# Patient Record
Sex: FEMALE | Race: WHITE | NOT HISPANIC OR LATINO | Employment: FULL TIME | URBAN - METROPOLITAN AREA
[De-identification: names, ages, dates, MRNs, and addresses within clinical notes are randomized per-mention and may not be internally consistent; named-entity substitution may affect disease eponyms.]

---

## 2017-06-14 ENCOUNTER — OFFICE VISIT (OUTPATIENT)
Dept: OBSTETRICS AND GYNECOLOGY | Facility: CLINIC | Age: 52
End: 2017-06-14
Attending: OBSTETRICS & GYNECOLOGY
Payer: COMMERCIAL

## 2017-06-14 VITALS
HEIGHT: 69 IN | SYSTOLIC BLOOD PRESSURE: 130 MMHG | WEIGHT: 244.5 LBS | BODY MASS INDEX: 36.21 KG/M2 | DIASTOLIC BLOOD PRESSURE: 80 MMHG

## 2017-06-14 DIAGNOSIS — N95.0 POSTMENOPAUSAL BLEEDING: Primary | ICD-10-CM

## 2017-06-14 DIAGNOSIS — N84.1 CERVICAL POLYP: ICD-10-CM

## 2017-06-14 PROCEDURE — 87591 N.GONORRHOEAE DNA AMP PROB: CPT

## 2017-06-14 PROCEDURE — 99203 OFFICE O/P NEW LOW 30 MIN: CPT | Mod: 25,S$GLB,, | Performed by: OBSTETRICS & GYNECOLOGY

## 2017-06-14 PROCEDURE — 88305 TISSUE EXAM BY PATHOLOGIST: CPT | Mod: 26,,, | Performed by: PATHOLOGY

## 2017-06-14 PROCEDURE — 88305 TISSUE EXAM BY PATHOLOGIST: CPT | Performed by: PATHOLOGY

## 2017-06-14 PROCEDURE — 57500 BIOPSY OF CERVIX: CPT | Mod: S$GLB,,, | Performed by: OBSTETRICS & GYNECOLOGY

## 2017-06-14 PROCEDURE — 99999 PR PBB SHADOW E&M-EST. PATIENT-LVL III: CPT | Mod: PBBFAC,,, | Performed by: OBSTETRICS & GYNECOLOGY

## 2017-06-14 NOTE — PROGRESS NOTES
Chief Complaint   Patient presents with    Vaginal Bleeding       HPI:  Nadia Sbaa is a 51 y.o. female patient  who presents today for evaluation of postmenopausal bleeding.  She describes being postmenopausal for the past 2 years except for 1 episode of spotting about 6 months ago.  However, two weeks ago, she describes bleeding like a period, lasting 7 days in duration.  She was seen by her primary care physician last week at [facility] and had a pap performed.  Patient's last menstrual period was 2013.    Past Medical History:   Diagnosis Date    Arthritis     Fibromyalgia        Past Surgical History:   Procedure Laterality Date    APPENDECTOMY       SECTION      TONSILLECTOMY           ROS:  GENERAL: Feeling well overall.   SKIN: Denies rash or lesions.   HEAD: Denies head injury or headache.   NODES: Denies enlarged lymph nodes.   CHEST: Denies chest pain or shortness of breath.   CARDIOVASCULAR: Denies palpitations or left sided chest pain.   ABDOMEN: No abdominal pain, nausea, vomiting or rectal bleeding.   URINARY: No dysuria or hematuria.  REPRODUCTIVE: See HPI.   BREASTS: Denies pain, lumps, or nipple discharge.   HEMATOLOGIC: No easy bruisability or excessive bleeding.   MUSCULOSKELETAL: Reports joint discomfort.   NEUROLOGIC: Denies syncope or weakness.   PSYCHIATRIC: Denies depression.    PE:   (chaperone present during entire exam)  APPEARANCE: Well nourished, well developed, in no acute distress.  ABDOMEN: Soft. No tenderness or masses. No hernias. No CVA tenderness.  VULVA: No lesions. Normal female genitalia.  URETHRAL MEATUS: Normal size and location, no lesions, no prolapse.  URETHRA: No masses, tenderness, prolapse or scarring.  VAGINA: No lesions, no discharge, no significant cystocele or rectocele.  CERVIX: No lesions and discharge.  Endocervical polyp at os.  UTERUS: Normal size, regular shape, mobile, non-tender, bladder base nontender.  ADNEXA:  No masses, tenderness or CDS nodularity.  ANUS PERINEUM: Normal.    We discussed the endocervical polyp and recommendation for removal.  Reviewed r / b of polypectomy - consent given  Cervix prepped.  Polyp grasped with ring forceps.  With twisting, polyp easily removed.  Hemostatic.  Polyp sent to pathology for evaluation.      Diagnosis:  1. Postmenopausal bleeding    2. Cervical polyp          PLAN:    Orders Placed This Encounter    C. trachomatis/N. gonorrhoeae by AMP DNA Cervix    US Pelvis Limited Non OB    POCT Urine Pregnancy    Tissue Specimen To Pathology, Obstetrics/Gynecology       Patient was counseled today on her recent episode of PMB and the various etiologies.  We reviewed the need for pelvic sono and EMBX to rule out endometrial pathology.  We also discussed the endocervical polyp that was removed today.  She will bring a copy of her pap to her next visit.    Follow-up after pelvic sono 6/15 for EMBX 6/22.

## 2017-06-15 ENCOUNTER — TELEPHONE (OUTPATIENT)
Dept: OBSTETRICS AND GYNECOLOGY | Facility: CLINIC | Age: 52
End: 2017-06-15

## 2017-06-15 ENCOUNTER — HOSPITAL ENCOUNTER (OUTPATIENT)
Dept: RADIOLOGY | Facility: OTHER | Age: 52
Discharge: HOME | End: 2017-06-15
Attending: OBSTETRICS & GYNECOLOGY
Payer: COMMERCIAL

## 2017-06-15 DIAGNOSIS — N95.0 POSTMENOPAUSAL BLEEDING: ICD-10-CM

## 2017-06-15 PROCEDURE — 76856 US EXAM PELVIC COMPLETE: CPT | Mod: TC

## 2017-06-15 PROCEDURE — 76830 TRANSVAGINAL US NON-OB: CPT | Mod: 26,,, | Performed by: RADIOLOGY

## 2017-06-15 PROCEDURE — 76856 US EXAM PELVIC COMPLETE: CPT | Mod: 26,,, | Performed by: RADIOLOGY

## 2017-06-15 NOTE — TELEPHONE ENCOUNTER
Called patient:    Discussed results of pelvic sono from today:    The uterus is normal in size measuring 8.1 x 4.9 x 6.9 cm and the endometrial stripe is uniform in thickness measuring 9 mm.  There is a 4.8 cm left posterior intramural fibroid.    The right ovary measures 3.0 x 1.9 x 2.4 cm with normal blood flow. 2.2 cm complex appearing nodule in the right ovary.    The left ovary is not visualized.    There is no free pelvic fluid.      Impression         Endometrial stripe is enlarged to 9 mm, which is abnormal in a postmenopausal female. Endometrial sampling may be warranted.    There is also a 2.2 cm right ovarian nodule. Short term interval followup versus further evaluation with MRI or surgical consultation is advised given postmenopausal status.     She is scheduled to see us 6/22/17 for EBMX    Discussed the need for follow-up for ovarian cyst.

## 2017-06-16 LAB
C TRACH DNA SPEC QL NAA+PROBE: NOT DETECTED
N GONORRHOEA DNA SPEC QL NAA+PROBE: NOT DETECTED

## 2017-06-19 ENCOUNTER — TELEPHONE (OUTPATIENT)
Dept: OBSTETRICS AND GYNECOLOGY | Facility: CLINIC | Age: 52
End: 2017-06-19

## 2017-06-19 NOTE — TELEPHONE ENCOUNTER
Called patient:    Discussed results of polypectomy    FINAL PATHOLOGIC DIAGNOSIS  BENIGN ENDOCERVICAL POLYP    She is scheduled to see us later this week for EMBX.

## 2017-06-22 ENCOUNTER — PROCEDURE VISIT (OUTPATIENT)
Dept: OBSTETRICS AND GYNECOLOGY | Facility: CLINIC | Age: 52
End: 2017-06-22
Attending: OBSTETRICS & GYNECOLOGY
Payer: COMMERCIAL

## 2017-06-22 VITALS
WEIGHT: 247.56 LBS | HEIGHT: 69 IN | DIASTOLIC BLOOD PRESSURE: 76 MMHG | BODY MASS INDEX: 36.67 KG/M2 | SYSTOLIC BLOOD PRESSURE: 118 MMHG

## 2017-06-22 DIAGNOSIS — R87.615 UNSATISFACTORY CERVICAL PAPANICOLAOU SMEAR: ICD-10-CM

## 2017-06-22 DIAGNOSIS — N95.0 POSTMENOPAUSAL BLEEDING: Primary | ICD-10-CM

## 2017-06-22 DIAGNOSIS — N83.8 OVARIAN MASS: ICD-10-CM

## 2017-06-22 PROCEDURE — 88305 TISSUE EXAM BY PATHOLOGIST: CPT | Mod: 26,,, | Performed by: PATHOLOGY

## 2017-06-22 PROCEDURE — 87624 HPV HI-RISK TYP POOLED RSLT: CPT

## 2017-06-22 PROCEDURE — 88175 CYTOPATH C/V AUTO FLUID REDO: CPT

## 2017-06-22 PROCEDURE — 88305 TISSUE EXAM BY PATHOLOGIST: CPT | Performed by: PATHOLOGY

## 2017-06-22 PROCEDURE — 58100 BIOPSY OF UTERUS LINING: CPT | Mod: S$GLB,,, | Performed by: OBSTETRICS & GYNECOLOGY

## 2017-06-22 NOTE — PROCEDURES
CC: ENDOMETRIAL BIOPSPY    Nadia Saba is a 51 y.o. female  presents for an endometrial biopsy secondary to postmenopausal bleeding.  She describes being postmenopausal for the past 2 years except for 1 episode of spotting about 6 months ago.  However, three weeks ago, she describes bleeding like a period, lasting 7 days in duration.  She reports that her most recent pap performed by her PCP at the [facility] was unsatisfactory.      Pelvic sono:  The uterus is normal in size measuring 8.1 x 4.9 x 6.9 cm and the endometrial stripe is uniform in thickness measuring 9 mm.  There is a 4.8 cm left posterior intramural fibroid.  The right ovary measures 3.0 x 1.9 x 2.4 cm with normal blood flow. 2.2 cm complex appearing nodule in the right ovary.  The left ovary is not visualized.  There is no free pelvic fluid.   Impression   Endometrial stripe is enlarged to 9 mm, which is abnormal in a postmenopausal female. Endometrial sampling may be warranted.  There is also a 2.2 cm right ovarian nodule. Short term interval followup versus further evaluation with MRI or surgical consultation is advised given postmenopausal status.       UPT is negative    PRE ENDOMETRIAL BIOPSY COUNSELING:  The patient was informed of the risk of bleeding, infection, uterine perforation and pain and that the test will rule-out endometrial cancer with accuracy greater than 95%. She was counseled on the alternatives to endometrial biopsy and agrees to proceed.    TIME OUT PERFORMED.  The cervix was visualized with a speculum  Pap performed  Cervix prepped with betadine  A sterile endometrial pipelle was passed without difficulty to a depth of 8 cm.  Endometrial tissue was obtained.  The specimen was placed in formalyn and sent to Pathology for histology evaluation.   The patient tolerated the procedure well.    ASSESSMENT and PLAN    1. Postmenopausal bleeding    2. Ovarian mass          POST ENDOMETRIAL BIOPSY  COUNSELING:  Manage post biopsy cramping with NSAIDs or Tylenol.  Expect spotting or light bleeding for a few days.  Report bleeding heavier than a period, fever > 101.0 F, worsening pain or a foul smelling vaginal discharge.    We also discussed the pelvic ultrasound and the 2 cm ovarian nodule.  She will have pelvic MRI performed for further evaluation.     FOLLOW-UP: Pending biopsy results.  We will contact her in several days to discuss biopsy results and treatment plan.  Pelvic MRI

## 2017-06-27 ENCOUNTER — TELEPHONE (OUTPATIENT)
Dept: OBSTETRICS AND GYNECOLOGY | Facility: CLINIC | Age: 52
End: 2017-06-27

## 2017-06-27 NOTE — TELEPHONE ENCOUNTER
Patient declined the appointment offered to her today but was scheduled next week for the MRI to be done earlier. Patient was advised to call or go to the ED if the symptoms worsened. Patient verbalized understanding and will comply.

## 2017-06-27 NOTE — TELEPHONE ENCOUNTER
----- Message from Mary Rivera sent at 6/26/2017  4:01 PM CDT -----  Contact: self  Patient is requesting a call back to discuss severe lower back pain she has been having, patient can be reached at [phone].

## 2017-06-29 ENCOUNTER — PATIENT MESSAGE (OUTPATIENT)
Dept: OBSTETRICS AND GYNECOLOGY | Facility: CLINIC | Age: 52
End: 2017-06-29

## 2017-06-29 LAB
HPV HR 12 DNA CVX QL NAA+PROBE: NEGATIVE
HPV16 DNA SPEC QL NAA+PROBE: NEGATIVE
HPV18 DNA SPEC QL NAA+PROBE: NEGATIVE

## 2017-07-03 ENCOUNTER — TELEPHONE (OUTPATIENT)
Dept: OBSTETRICS AND GYNECOLOGY | Facility: CLINIC | Age: 52
End: 2017-07-03

## 2017-07-03 NOTE — TELEPHONE ENCOUNTER
----- Message from Alexandr Maldonado sent at 7/3/2017 12:50 PM CDT -----  Contact: Tomeka HAYDEN   x_ 1st Request  _ 2nd Request  _ 3rd Request    Who: Tomeka HAYDEN     Why: Tomeka HAYDEN is reporting that the patient's authorization for the pelvic MRI has been approved. Dates of approval last thru 07/03/17 - 10/31/17.     What Number to Call Back: [phone]    When to Expect a call back: (Before the end of the day)  -- if call after 3:00 call back will be tomorrow.

## 2017-07-05 ENCOUNTER — HOSPITAL ENCOUNTER (OUTPATIENT)
Dept: RADIOLOGY | Facility: OTHER | Age: 52
Discharge: HOME | End: 2017-07-05
Attending: OBSTETRICS & GYNECOLOGY
Payer: COMMERCIAL

## 2017-07-05 DIAGNOSIS — N83.8 OVARIAN MASS: ICD-10-CM

## 2017-07-05 PROCEDURE — A9585 GADOBUTROL INJECTION: HCPCS | Performed by: OBSTETRICS & GYNECOLOGY

## 2017-07-05 PROCEDURE — 72197 MRI PELVIS W/O & W/DYE: CPT | Mod: 26,,, | Performed by: RADIOLOGY

## 2017-07-05 PROCEDURE — 25500020 PHARM REV CODE 255: Performed by: OBSTETRICS & GYNECOLOGY

## 2017-07-05 PROCEDURE — 72197 MRI PELVIS W/O & W/DYE: CPT | Mod: TC

## 2017-07-05 RX ORDER — GADOBUTROL 604.72 MG/ML
10 INJECTION INTRAVENOUS
Status: COMPLETED | OUTPATIENT
Start: 2017-07-05 | End: 2017-07-05

## 2017-07-05 RX ADMIN — GADOBUTROL 10 ML: 604.72 INJECTION INTRAVENOUS at 08:07

## 2017-07-06 ENCOUNTER — PATIENT MESSAGE (OUTPATIENT)
Dept: OBSTETRICS AND GYNECOLOGY | Facility: CLINIC | Age: 52
End: 2017-07-06

## 2017-07-06 ENCOUNTER — TELEPHONE (OUTPATIENT)
Dept: OBSTETRICS AND GYNECOLOGY | Facility: CLINIC | Age: 52
End: 2017-07-06

## 2017-07-06 NOTE — TELEPHONE ENCOUNTER
You do not need to come in, I was just offering an appointment if you wanted to discuss in person. I see that you had the MRI yesterday and Dr Huerta may have been waiting for both test to be resulted before calling you. I will leave the message for him to review. Marli  ===View-only below this line===      ----- Message -----     From: Nadia Saba     Sent: 7/6/2017 11:20 AM CDT       To: Ross Huerta MD  Subject: RE: Test Results    I did not know if I needed to come in or if he would call with them  I guess I will see Monday if he calls, He had told me he would have my biopsy back last week and call me and did not hear from him, thought I would have them back by now , hoping not to have to wait another week for these results, very stressful waiting on these and to wait another week  ----- Message -----  From: Med Assistant Calles  Sent: 7/6/2017 11:03 AM CDT  To: Nadia Saba  Subject: RE: Test Results  Dr Huerta is out of the office until next week. I would be happy to schedule you Thursday morning if you would like to sit down and discuss the results and talk. However I can ask him to call you if you would like sometime next week when he returns. Please let me know your preference. Marli

## 2017-07-07 NOTE — TELEPHONE ENCOUNTER
Please let patient know that her EMBX was normal.    FINAL PATHOLOGIC DIAGNOSIS  ENDOMETRIUM, BIOPSY:  -Scant strips of weakly proliferative endometrium.  -Negative for hyperplasia and malignancy.    I will call her when I return to discuss MRI findings.

## 2017-07-11 ENCOUNTER — TELEPHONE (OUTPATIENT)
Dept: OBSTETRICS AND GYNECOLOGY | Facility: CLINIC | Age: 52
End: 2017-07-11

## 2017-07-11 ENCOUNTER — PATIENT MESSAGE (OUTPATIENT)
Dept: OBSTETRICS AND GYNECOLOGY | Facility: CLINIC | Age: 52
End: 2017-07-11

## 2017-07-11 NOTE — TELEPHONE ENCOUNTER
Test Results     Nadia Huerta MD 3 minutes ago (4:42 PM)         Wanting to see about my MRI results and what steps we need to take next if any

## 2017-07-12 ENCOUNTER — PATIENT MESSAGE (OUTPATIENT)
Dept: OBSTETRICS AND GYNECOLOGY | Facility: CLINIC | Age: 52
End: 2017-07-12

## 2017-07-13 ENCOUNTER — TELEPHONE (OUTPATIENT)
Dept: OBSTETRICS AND GYNECOLOGY | Facility: CLINIC | Age: 52
End: 2017-07-13

## 2017-07-13 ENCOUNTER — PATIENT MESSAGE (OUTPATIENT)
Dept: OBSTETRICS AND GYNECOLOGY | Facility: CLINIC | Age: 52
End: 2017-07-13

## 2017-07-14 ENCOUNTER — TELEPHONE (OUTPATIENT)
Dept: OBSTETRICS AND GYNECOLOGY | Facility: CLINIC | Age: 52
End: 2017-07-14

## 2017-07-14 DIAGNOSIS — N95.0 POSTMENOPAUSAL BLEEDING: Primary | ICD-10-CM

## 2017-07-14 NOTE — TELEPHONE ENCOUNTER
----- Message from Jameel Davila sent at 7/14/2017  9:26 AM CDT -----  Contact: Pt  X_ 1st Request  _ 2nd Request  _ 3rd Request    Who: KIARA CALDERÓN [[id number]]    Why: Patient would like to speak with staff in regards to setting up a D & C    What Number to Call Back: [phone]    When to Expect a call back: (Before the end of the day)  -- if call after 3:00 call back will be tomorrow.

## 2017-07-14 NOTE — TELEPHONE ENCOUNTER
Called patient:    Discussed results of recent MRI, comparing with pelvic sono:    Uterus is anteverted and anteflexed. Endometrium has uniform signal and has a maximal thickness of 13 millimeters. There is small amount of fluid within the endometrial canal. There is an intramural fibroid of the lateral uterus which measures 4.7 x 4.1 x 3.9 cm..       Junctional zone is normal.    Several fibroids are scattered throughout the myometrium. ....    Left ovary measures 3.0 x 2.2 x 1.7 cm cm. The right ovary measures 3.2 x 3.1 x 3.1 cm cm. There Is a 3.0 x 2.7 x 2.3 cm cystic lesion with internal fluid fluid level. Trace free fluid in the region of the left adnexa and cul-de-sac.    Bladder is relatively decompressed and has normal appearance. No enlarged lymph nodes.   Osseous structures have normal marrow signal characteristics.      Impression         Endometrial stripe measured approximately 13 mm, which is a enlarged for a postmenopausal patient. Small amount of fluid within the endometrial canal. Endometrial sampling may be warranted.    The 2.2 cm solid lesion of the right ovary noted on recent ultrasound is not definitively visualized. There is a 3.2 cystic lesion with an internal fluid/fluid level noted within the right ovary which is also abnormal in a postmenopausal patient. Recommend short term interval followup with ultrasound versus surgical consultation. Large intramural uterine fibroid.       She reports having continued episodes of off / on bleeding.  Previously, she had been without a period for several years.  ES is significantly thickened with negative EMBX.  Discussed possible endometrial polyp.    REC: D&C, Hysteroscopy for further evaluation - she will check her calender and let us know date to schedule.  She already has follow-up sono for evaluation of adnexa 8/10/17.

## 2017-08-03 ENCOUNTER — PATIENT MESSAGE (OUTPATIENT)
Dept: SURGERY | Facility: OTHER | Age: 52
End: 2017-08-03

## 2017-08-04 ENCOUNTER — TELEPHONE (OUTPATIENT)
Dept: OBSTETRICS AND GYNECOLOGY | Facility: CLINIC | Age: 52
End: 2017-08-04

## 2017-08-04 NOTE — TELEPHONE ENCOUNTER
----- Message from Alexandr Maldonado sent at 8/4/2017  3:40 PM CDT -----  Contact: Pt  x_ 1st Request  _ 2nd Request  _ 3rd Request    Who: Pt    Why: was instructed to schedule a pre op appointment before her surgery date on 08/10/17.    What Number to Call Back: [phone]    When to Expect a call back: (Before the end of the day)  -- if call after 3:00 call back will be tomorrow.

## 2017-08-04 NOTE — TELEPHONE ENCOUNTER
Message   Received: Today   Message Contents   Alexandr DIETRICH Staff   Caller: Pt (Today,  3:40 PM)             x_ 1st Request   _ 2nd Request   _ 3rd Request     Who: Pt     Why: was instructed to schedule a pre op appointment before her surgery date on 08/10/17.     What Number to Call Back: [phone]     When to Expect a call back: (Before

## 2017-08-07 ENCOUNTER — TELEPHONE (OUTPATIENT)
Dept: OBSTETRICS AND GYNECOLOGY | Facility: CLINIC | Age: 52
End: 2017-08-07

## 2017-08-07 NOTE — TELEPHONE ENCOUNTER
----- Message from Nahed Cage sent at 8/7/2017  9:23 AM CDT -----  Contact: self  Pt returning a missed call, she can be reached at [phone].

## 2017-08-09 ENCOUNTER — HOSPITAL ENCOUNTER (OUTPATIENT)
Dept: PREADMISSION TESTING | Facility: OTHER | Age: 52
Discharge: HOME | End: 2017-08-09
Attending: OBSTETRICS & GYNECOLOGY
Payer: COMMERCIAL

## 2017-08-09 ENCOUNTER — ANESTHESIA EVENT (OUTPATIENT)
Dept: SURGERY | Facility: OTHER | Age: 52
End: 2017-08-09
Payer: COMMERCIAL

## 2017-08-09 ENCOUNTER — OFFICE VISIT (OUTPATIENT)
Dept: OBSTETRICS AND GYNECOLOGY | Facility: CLINIC | Age: 52
End: 2017-08-09
Attending: OBSTETRICS & GYNECOLOGY
Payer: COMMERCIAL

## 2017-08-09 VITALS
HEART RATE: 72 BPM | BODY MASS INDEX: 38.63 KG/M2 | WEIGHT: 260.81 LBS | HEIGHT: 69 IN | OXYGEN SATURATION: 100 % | DIASTOLIC BLOOD PRESSURE: 72 MMHG | SYSTOLIC BLOOD PRESSURE: 134 MMHG | TEMPERATURE: 98 F

## 2017-08-09 VITALS
WEIGHT: 260.81 LBS | DIASTOLIC BLOOD PRESSURE: 82 MMHG | BODY MASS INDEX: 38.63 KG/M2 | SYSTOLIC BLOOD PRESSURE: 124 MMHG | HEIGHT: 69 IN

## 2017-08-09 DIAGNOSIS — N93.9 ABNORMAL UTERINE BLEEDING (AUB): Primary | ICD-10-CM

## 2017-08-09 LAB
BASOPHILS # BLD AUTO: 0.02 K/UL
BASOPHILS NFR BLD: 0.4 %
DIFFERENTIAL METHOD: NORMAL
EOSINOPHIL # BLD AUTO: 0.2 K/UL
EOSINOPHIL NFR BLD: 4 %
ERYTHROCYTE [DISTWIDTH] IN BLOOD BY AUTOMATED COUNT: 13.7 %
HCT VFR BLD AUTO: 39.9 %
HGB BLD-MCNC: 13.3 G/DL
LYMPHOCYTES # BLD AUTO: 1.8 K/UL
LYMPHOCYTES NFR BLD: 32.1 %
MCH RBC QN AUTO: 27.3 PG
MCHC RBC AUTO-ENTMCNC: 33.3 G/DL
MCV RBC AUTO: 82 FL
MONOCYTES # BLD AUTO: 0.5 K/UL
MONOCYTES NFR BLD: 8.7 %
NEUTROPHILS # BLD AUTO: 3 K/UL
NEUTROPHILS NFR BLD: 54.6 %
PLATELET # BLD AUTO: 237 K/UL
PMV BLD AUTO: 11.5 FL
RBC # BLD AUTO: 4.87 M/UL
WBC # BLD AUTO: 5.54 K/UL

## 2017-08-09 PROCEDURE — 85025 COMPLETE CBC W/AUTO DIFF WBC: CPT

## 2017-08-09 PROCEDURE — 36415 COLL VENOUS BLD VENIPUNCTURE: CPT

## 2017-08-09 PROCEDURE — 99999 PR PBB SHADOW E&M-EST. PATIENT-LVL II: CPT | Mod: PBBFAC,,, | Performed by: OBSTETRICS & GYNECOLOGY

## 2017-08-09 PROCEDURE — 99499 UNLISTED E&M SERVICE: CPT | Mod: S$GLB,,, | Performed by: OBSTETRICS & GYNECOLOGY

## 2017-08-09 RX ORDER — SODIUM CHLORIDE, SODIUM LACTATE, POTASSIUM CHLORIDE, CALCIUM CHLORIDE 600; 310; 30; 20 MG/100ML; MG/100ML; MG/100ML; MG/100ML
INJECTION, SOLUTION INTRAVENOUS CONTINUOUS
Status: CANCELLED | OUTPATIENT
Start: 2017-08-09

## 2017-08-09 RX ORDER — DEXTROSE, SODIUM CHLORIDE, SODIUM LACTATE, POTASSIUM CHLORIDE, AND CALCIUM CHLORIDE 5; .6; .31; .03; .02 G/100ML; G/100ML; G/100ML; G/100ML; G/100ML
INJECTION, SOLUTION INTRAVENOUS CONTINUOUS
Status: CANCELLED | OUTPATIENT
Start: 2017-08-09

## 2017-08-09 RX ORDER — LIDOCAINE HYDROCHLORIDE 10 MG/ML
1 INJECTION, SOLUTION EPIDURAL; INFILTRATION; INTRACAUDAL; PERINEURAL ONCE
Status: CANCELLED | OUTPATIENT
Start: 2017-08-09 | End: 2017-08-09

## 2017-08-09 RX ORDER — MIDAZOLAM HYDROCHLORIDE 1 MG/ML
5 INJECTION INTRAMUSCULAR; INTRAVENOUS
Status: DISCONTINUED | OUTPATIENT
Start: 2017-08-10 | End: 2017-08-10 | Stop reason: HOSPADM

## 2017-08-09 NOTE — DISCHARGE INSTRUCTIONS
PRE-ADMIT TESTING -  [phone]    [address]  [facility]        OUTPATIENT SURGERY UNIT - [phone]    Your surgery has been scheduled at [facility]. We are pleased to have the opportunity to serve you. For Further Information please call [phone].    On the day of surgery please report to the Information Desk on the 1st floor.    · CONTACT YOUR PHYSICIAN'S OFFICE THE DAY PRIOR TO YOUR SURGERY TO OBTAIN YOUR ARRIVAL TIME.     · The evening before surgery do not eat anything after 9 p.m. ( this includes hard candy, chewing gum and mints).  You may only have GATORADE, POWERADE AND WATER  from 9 p.m. until you leave your home.   DO NOT DRINK ANY LIQUIDS ON THE WAY TO THE HOSPITAL.      SPECIAL MEDICATION INSTRUCTIONS: TAKE medications checked off by the Anesthesiologist on your Medication List.    Angiogram Patients: Take medications as instructed by your physician, including aspirin.     Surgery Patients:    If you take ASPIRIN - Your PHYSICIAN/SURGEON will need to inform you IF/OR when you need to stop taking aspirin prior to your surgery.     Do Not take any medications containing IBUPROFEN.  Do Not Wear any make-up or dark nail polish   (especially eye make-up) to surgery. If you come to surgery with makeup on you will be required to remove the makeup or nail polish.    Do not shave your surgical area at least 5 days prior to your surgery. The surgical prep will be performed at the hospital according to Infection Control regulations.    Leave all valuables at home.   Do Not wear any jewelry or watches, including any metal in body piercings.  Contact Lens must be removed before surgery. Either do not wear the contact lens or bring a case and solution for storage.  Please bring a container for eyeglasses or dentures as required.  Bring any paperwork your physician has provided, such as consent forms,  history and physicals, doctor's orders, etc.   Bring comfortable clothes  that are loose fitting to wear upon discharge. Take into consideration the type of surgery being performed.  Maintain your diet as advised per your physician the day prior to surgery.      Adequate rest the night before surgery is advised.   Park in the Parking lot behind the hospital or in the [facility] across the street from the parking lot. Parking is complimentary.  If you will be discharged the same day as your procedure, please arrange for a responsible adult to drive you home or to accompany you if traveling by taxi.   YOU WILL NOT BE PERMITTED TO DRIVE OR TO LEAVE THE HOSPITAL ALONE AFTER SURGERY.   It is strongly recommended that you arrange for someone to remain with you for the first 24 hrs following your surgery.       Thank you for your cooperation.  The Staff of [facility].        Bathing Instructions                                                                 Please shower the evening before and morning of your procedure with    ANTIBACTERIAL SOAP. ( DIAL, etc )  Concentrate on the surgical area   for at least 3 minutes and rinse completely. Dry off as usual.   Do not use any deodorant, powder, body lotions, perfume, after shave or    cologne.

## 2017-08-09 NOTE — H&P
C.C Abnormal uterine bleeding / postmenopausal bleeding    HPI : Cathleen Saba is a 51 y.o. female  for D&C, hysteroscopy for the evaluation of abnormal uterine bleeding / postmenopausal bleeding.  She describes being postmenopausal for the past 2 years except for 1 episode of spotting about 6 months ago.  However, about 6 weeks ago, she describes bleeding like a period, lasting 7 days in duration.  Since then, she has been having bleeding off and on, occurring every several days.    Pelvic sono 6/15/17:  The uterus is normal in size measuring 8.1 x 4.9 x 6.9 cm and the endometrial stripe is uniform in thickness measuring 9 mm.  There is a 4.8 cm left posterior intramural fibroid.se  o nodule in the right ovary.  The left ovary is not visualized.  There is no free pelvic fluid.   Impression   Endometrial stripe is enlarged to 9 mm, which is abnormal in a postmenopausal female. Endometrial sampling may be warranted.  There is also a 2.2 cm right ovarian nodule. Short term interval followup versus further evaluation with MRI or surgical consultation is advised given postmenopausal status.     CT scan 17:  Results:  Uterus is anteverted and anteflexed. Endometrium has uniform signal and has a maximal thickness of 13 millimeters. There is small amount of fluid within the endometrial canal. There is an intramural fibroid of the lateral uterus which measures 4.7 x 4.1 x 3.9 cm..     Junctional zone is normal.  Several fibroids are scattered throughout the myometrium. ....  Left ovary measures 3.0 x 2.2 x 1.7 cm cm. The right ovary measures 3.2 x 3.1 x 3.1 cm cm. There Is a 3.0 x 2.7 x 2.3 cm cystic lesion with internal fluid fluid level. Trace free fluid in the region of the left adnexa and cul-de-sac.  Bladder is relatively decompressed and has normal appearance. No enlarged lymph nodes.   Osseous structures have normal marrow signal characteristics.   Impression   Endometrial stripe measured approximately 13  "mm, which is a enlarged for a postmenopausal patient. Small amount of fluid within the endometrial canal. Endometrial sampling may be warranted.  The 2.2 cm solid lesion of the right ovary noted on recent ultrasound is not definitively visualized. There is a 3.2 cystic lesion with an internal fluid/fluid level noted within the right ovary which is also abnormal in a postmenopausal patient. Recommend short term interval followup with ultrasound versus surgical consultation. Large intramural uterine fibroid.     EMBX 17:  FINAL PATHOLOGIC DIAGNOSIS  ENDOMETRIUM, BIOPSY:  -Scant strips of weakly proliferative endometrium.  -Negative for hyperplasia and malignancy.    Pap 17: Negative    GC/CT 17: Negative      Past Medical History:   Diagnosis Date    Arthritis     Fibromyalgia      Past Surgical History:   Procedure Laterality Date    APPENDECTOMY       SECTION      TONSILLECTOMY       Family History   Problem Relation Age of Onset    Heart disease Mother     Arthritis Mother      RHEUMATOID    Heart disease Father     COPD Father     Arthritis Father      RHEUMATOID     Social History   Substance Use Topics    Smoking status: Never Smoker    Smokeless tobacco: Never Used    Alcohol use Yes      Comment: VERY SELDOM     OB History    Para Term  AB Living   3 2 2     3   SAB TAB Ectopic Multiple Live Births           3      # Outcome Date GA Lbr Angel/2nd Weight Sex Delivery Anes PTL Lv   3      M CS-Unspec   ARABELLA   2 Term     M Vag-Spont   ARABELLA   1 Term     F Vag-Spont   ARABELLA          /82   Ht 5' 9" (1.753 m)   Wt 118.3 kg (260 lb 12.9 oz)   LMP 2013   BMI 38.51 kg/m²     ROS:  GENERAL: Feeling well overall.   SKIN: Denies rash or lesions.   HEAD: Denies head injury or headache.   NODES: Denies enlarged lymph nodes.   CHEST: Denies chest pain or shortness of breath.   CARDIOVASCULAR: Denies palpitations or left sided chest pain.   ABDOMEN: " No abdominal pain, nausea, vomiting or rectal bleeding.   URINARY: No frequency, dysuria, hematuria, or burning on urination.  REPRODUCTIVE: See HPI.   BREASTS: Denies pain, lumps, or nipple discharge.   HEMATOLOGIC: No easy bruisability.  MUSCULOSKELETAL: Denies joint pain or swelling.   NEUROLOGIC: Denies syncope or weakness.   PSYCHIATRIC: Denies depression.      PHYSICAL EXAM: (6/14/17)  APPEARANCE: Well nourished, well developed, in no acute distress.  AFFECT: WNL, alert and oriented x 3  SKIN: No acne or hirsutism  NECK: Neck symmetric without masses or thyromegaly  NODES: No inguinal, cervical, axillary, or femoral lymph node enlargement  CHEST: Good respiratory effect  ABDOMEN: Soft.  No tenderness or masses.     PELVIC: Normal external genitalia without lesions.  Normal hair distribution.  Adequate perineal body, normal urethral meatus.  Vagina moist and well rugated without lesions or discharge.  Cervix pink, without lesions, discharge or tenderness.  No significant cystocele or rectocele.  Bimanual exam shows uterus to be normal size, regular, mobile and nontender.  Adnexa without masses or tenderness.    EXTREMITIES: No edema.    ASSESSMENT:  1. Abnormal uterine bleeding (AUB)    We discussed her abnormal / postmenopausal bleeding with a thickened ES and recommendation for further evaluation with D&C, hysteroscopy, possible polypectomy.  I have discussed the risks, benefits, indications, and alternatives of the procedure in detail.  The patient verbalizes her understanding.  All questions answered.  Consents signed.  The patient agrees to proceed to proceed as planned.    PLAN:  D&C, Hysteroscopy, possible polypectomy 8/10/17

## 2017-08-09 NOTE — PROGRESS NOTES
C.C Abnormal uterine bleeding / postmenopausal bleeding    HPI : Cathleen Saba is a 51 y.o. female  for evaluation and discussion of treatment options for abnormal uterine bleeding / postmenopausal bleeding.  She describes being postmenopausal for the past 2 years except for 1 episode of spotting about 6 months ago.  However, about 6 weeks ago, she describes bleeding like a period, lasting 7 days in duration.  Since then, she has been having bleeding off and on, occurring every several days.    Pelvic sono 6/15/17:  The uterus is normal in size measuring 8.1 x 4.9 x 6.9 cm and the endometrial stripe is uniform in thickness measuring 9 mm.  There is a 4.8 cm left posterior intramural fibroid.se  o nodule in the right ovary.  The left ovary is not visualized.  There is no free pelvic fluid.   Impression   Endometrial stripe is enlarged to 9 mm, which is abnormal in a postmenopausal female. Endometrial sampling may be warranted.  There is also a 2.2 cm right ovarian nodule. Short term interval followup versus further evaluation with MRI or surgical consultation is advised given postmenopausal status.     CT scan 17:  Results:  Uterus is anteverted and anteflexed. Endometrium has uniform signal and has a maximal thickness of 13 millimeters. There is small amount of fluid within the endometrial canal. There is an intramural fibroid of the lateral uterus which measures 4.7 x 4.1 x 3.9 cm..     Junctional zone is normal.  Several fibroids are scattered throughout the myometrium. ....  Left ovary measures 3.0 x 2.2 x 1.7 cm cm. The right ovary measures 3.2 x 3.1 x 3.1 cm cm. There Is a 3.0 x 2.7 x 2.3 cm cystic lesion with internal fluid fluid level. Trace free fluid in the region of the left adnexa and cul-de-sac.  Bladder is relatively decompressed and has normal appearance. No enlarged lymph nodes.   Osseous structures have normal marrow signal characteristics.   Impression   Endometrial stripe measured  "approximately 13 mm, which is a enlarged for a postmenopausal patient. Small amount of fluid within the endometrial canal. Endometrial sampling may be warranted.  The 2.2 cm solid lesion of the right ovary noted on recent ultrasound is not definitively visualized. There is a 3.2 cystic lesion with an internal fluid/fluid level noted within the right ovary which is also abnormal in a postmenopausal patient. Recommend short term interval followup with ultrasound versus surgical consultation. Large intramural uterine fibroid.     EMBX 17:  FINAL PATHOLOGIC DIAGNOSIS  ENDOMETRIUM, BIOPSY:  -Scant strips of weakly proliferative endometrium.  -Negative for hyperplasia and malignancy.    Pap 17: Negative    GC/CT 17: Negative      Past Medical History:   Diagnosis Date    Arthritis     Fibromyalgia      Past Surgical History:   Procedure Laterality Date    APPENDECTOMY       SECTION      TONSILLECTOMY       Family History   Problem Relation Age of Onset    Heart disease Mother     Arthritis Mother      RHEUMATOID    Heart disease Father     COPD Father     Arthritis Father      RHEUMATOID     Social History   Substance Use Topics    Smoking status: Never Smoker    Smokeless tobacco: Never Used    Alcohol use Yes      Comment: VERY SELDOM     OB History    Para Term  AB Living   3 2 2     3   SAB TAB Ectopic Multiple Live Births           3      # Outcome Date GA Lbr Angel/2nd Weight Sex Delivery Anes PTL Lv   3      M CS-Unspec   ARABELLA   2 Term     M Vag-Spont   ARABELLA   1 Term     F Vag-Spont   ARABELLA          /82   Ht 5' 9" (1.753 m)   Wt 118.3 kg (260 lb 12.9 oz)   LMP 2013   BMI 38.51 kg/m²         ASSESSMENT:  1. Abnormal uterine bleeding (AUB)    We discussed her abnormal / postmenopausal bleeding with a thickened ES and recommendation for further evaluation with D&C, hysteroscopy, possible polypectomy.  I have discussed the risks, benefits, " indications, and alternatives of the procedure in detail.  The patient verbalizes her understanding.  All questions answered.  Consents signed.  The patient agrees to proceed to proceed as planned.    PLAN:  D&C, Hysteroscopy, possible polypectomy 8/10/17

## 2017-08-09 NOTE — ANESTHESIA PREPROCEDURE EVALUATION
08/09/2017  Cathleen Saba is a 51 y.o., female.    Anesthesia Evaluation    I have reviewed the Patient Summary Reports.    I have reviewed the Nursing Notes.   I have reviewed the Medications.     Review of Systems  Anesthesia Hx:  Denies Family Hx of Anesthesia complications.   Denies Personal Hx of Anesthesia complications.   Social:  Non-Smoker    Hematology/Oncology:         -- Anemia:   Cardiovascular:  Cardiovascular Normal Exercise tolerance: good     Pulmonary:  Pulmonary Normal    Renal/:  Renal/ Normal     Hepatic/GI:  Hepatic/GI Normal    Musculoskeletal:   Arthritis     Neurological:   Chronic Pain Syndrome (Fibromyalgia)   Endocrine:  Endocrine Normal        Physical Exam  General:  Obesity    Airway/Jaw/Neck:  Airway Findings: Mouth Opening: Normal Tongue: Normal  General Airway Assessment: Adult  Mallampati: II      Dental:  Dental Findings: In tact        Mental Status:  Mental Status Findings:  Alert and Oriented, Cooperative         Anesthesia Plan  Type of Anesthesia, risks & benefits discussed:  Anesthesia Type:  general  Patient's Preference:   Intra-op Monitoring Plan:   Intra-op Monitoring Plan Comments:   Post Op Pain Control Plan:   Post Op Pain Control Plan Comments:   Induction:   IV  Beta Blocker:         Informed Consent: Patient understands risks and agrees with Anesthesia plan.  Questions answered. Anesthesia consent signed with patient.  ASA Score: 2     Day of Surgery Review of History & Physical:    H&P update referred to the surgeon.         Ready For Surgery From Anesthesia Perspective.

## 2017-08-10 ENCOUNTER — SURGERY (OUTPATIENT)
Age: 52
End: 2017-08-10

## 2017-08-10 ENCOUNTER — ANESTHESIA (OUTPATIENT)
Dept: SURGERY | Facility: OTHER | Age: 52
End: 2017-08-10
Payer: COMMERCIAL

## 2017-08-10 ENCOUNTER — HOSPITAL ENCOUNTER (OUTPATIENT)
Facility: OTHER | Age: 52
Discharge: HOME | End: 2017-08-10
Attending: OBSTETRICS & GYNECOLOGY | Admitting: OBSTETRICS & GYNECOLOGY
Payer: COMMERCIAL

## 2017-08-10 VITALS
OXYGEN SATURATION: 100 % | DIASTOLIC BLOOD PRESSURE: 70 MMHG | HEART RATE: 70 BPM | RESPIRATION RATE: 20 BRPM | SYSTOLIC BLOOD PRESSURE: 135 MMHG | WEIGHT: 260 LBS | BODY MASS INDEX: 38.51 KG/M2 | TEMPERATURE: 98 F | HEIGHT: 69 IN

## 2017-08-10 DIAGNOSIS — Z98.890 S/P D&C (STATUS POST DILATION AND CURETTAGE): ICD-10-CM

## 2017-08-10 DIAGNOSIS — N93.9 ABNORMAL UTERINE BLEEDING (AUB): Primary | ICD-10-CM

## 2017-08-10 PROCEDURE — 25000003 PHARM REV CODE 250: Performed by: ANESTHESIOLOGY

## 2017-08-10 PROCEDURE — 88305 TISSUE EXAM BY PATHOLOGIST: CPT | Mod: 26,,, | Performed by: PATHOLOGY

## 2017-08-10 PROCEDURE — 37000008 HC ANESTHESIA 1ST 15 MINUTES: Performed by: OBSTETRICS & GYNECOLOGY

## 2017-08-10 PROCEDURE — 37000009 HC ANESTHESIA EA ADD 15 MINS: Performed by: OBSTETRICS & GYNECOLOGY

## 2017-08-10 PROCEDURE — 71000033 HC RECOVERY, INTIAL HOUR: Performed by: OBSTETRICS & GYNECOLOGY

## 2017-08-10 PROCEDURE — 63600175 PHARM REV CODE 636 W HCPCS: Performed by: NURSE ANESTHETIST, CERTIFIED REGISTERED

## 2017-08-10 PROCEDURE — 36000707: Performed by: OBSTETRICS & GYNECOLOGY

## 2017-08-10 PROCEDURE — 58558 HYSTEROSCOPY BIOPSY: CPT | Mod: ,,, | Performed by: OBSTETRICS & GYNECOLOGY

## 2017-08-10 PROCEDURE — 36000706: Performed by: OBSTETRICS & GYNECOLOGY

## 2017-08-10 PROCEDURE — 25000003 PHARM REV CODE 250: Performed by: NURSE ANESTHETIST, CERTIFIED REGISTERED

## 2017-08-10 PROCEDURE — 71000015 HC POSTOP RECOV 1ST HR: Performed by: OBSTETRICS & GYNECOLOGY

## 2017-08-10 PROCEDURE — 88305 TISSUE EXAM BY PATHOLOGIST: CPT | Performed by: PATHOLOGY

## 2017-08-10 PROCEDURE — 71000016 HC POSTOP RECOV ADDL HR: Performed by: OBSTETRICS & GYNECOLOGY

## 2017-08-10 PROCEDURE — C1782 MORCELLATOR: HCPCS | Performed by: OBSTETRICS & GYNECOLOGY

## 2017-08-10 PROCEDURE — 27201423 OPTIME MED/SURG SUP & DEVICES STERILE SUPPLY: Performed by: OBSTETRICS & GYNECOLOGY

## 2017-08-10 RX ORDER — LIDOCAINE HCL/PF 100 MG/5ML
SYRINGE (ML) INTRAVENOUS
Status: DISCONTINUED | OUTPATIENT
Start: 2017-08-10 | End: 2017-08-10

## 2017-08-10 RX ORDER — LIDOCAINE HYDROCHLORIDE 10 MG/ML
1 INJECTION, SOLUTION EPIDURAL; INFILTRATION; INTRACAUDAL; PERINEURAL ONCE
Status: DISCONTINUED | OUTPATIENT
Start: 2017-08-10 | End: 2017-08-10 | Stop reason: HOSPADM

## 2017-08-10 RX ORDER — ONDANSETRON HYDROCHLORIDE 2 MG/ML
INJECTION, SOLUTION INTRAMUSCULAR; INTRAVENOUS
Status: DISCONTINUED | OUTPATIENT
Start: 2017-08-10 | End: 2017-08-10

## 2017-08-10 RX ORDER — HYDROMORPHONE HYDROCHLORIDE 2 MG/ML
0.4 INJECTION, SOLUTION INTRAMUSCULAR; INTRAVENOUS; SUBCUTANEOUS EVERY 5 MIN PRN
Status: DISCONTINUED | OUTPATIENT
Start: 2017-08-10 | End: 2017-08-10 | Stop reason: HOSPADM

## 2017-08-10 RX ORDER — SODIUM CHLORIDE 0.9 % (FLUSH) 0.9 %
3 SYRINGE (ML) INJECTION
Status: DISCONTINUED | OUTPATIENT
Start: 2017-08-10 | End: 2017-08-10 | Stop reason: HOSPADM

## 2017-08-10 RX ORDER — IBUPROFEN 600 MG/1
600 TABLET ORAL EVERY 6 HOURS PRN
Qty: 60 TABLET | Refills: 0 | Status: SHIPPED | OUTPATIENT
Start: 2017-08-10 | End: 2017-11-01

## 2017-08-10 RX ORDER — OXYCODONE HYDROCHLORIDE 5 MG/1
5 TABLET ORAL
Status: DISCONTINUED | OUTPATIENT
Start: 2017-08-10 | End: 2017-08-10 | Stop reason: HOSPADM

## 2017-08-10 RX ORDER — DEXTROSE, SODIUM CHLORIDE, SODIUM LACTATE, POTASSIUM CHLORIDE, AND CALCIUM CHLORIDE 5; .6; .31; .03; .02 G/100ML; G/100ML; G/100ML; G/100ML; G/100ML
INJECTION, SOLUTION INTRAVENOUS CONTINUOUS
Status: DISCONTINUED | OUTPATIENT
Start: 2017-08-10 | End: 2017-08-10 | Stop reason: HOSPADM

## 2017-08-10 RX ORDER — ACETAMINOPHEN 10 MG/ML
INJECTION, SOLUTION INTRAVENOUS
Status: DISCONTINUED | OUTPATIENT
Start: 2017-08-10 | End: 2017-08-10

## 2017-08-10 RX ORDER — MIDAZOLAM HYDROCHLORIDE 1 MG/ML
INJECTION INTRAMUSCULAR; INTRAVENOUS
Status: DISCONTINUED | OUTPATIENT
Start: 2017-08-10 | End: 2017-08-10

## 2017-08-10 RX ORDER — SODIUM CHLORIDE, SODIUM LACTATE, POTASSIUM CHLORIDE, CALCIUM CHLORIDE 600; 310; 30; 20 MG/100ML; MG/100ML; MG/100ML; MG/100ML
INJECTION, SOLUTION INTRAVENOUS CONTINUOUS
Status: DISCONTINUED | OUTPATIENT
Start: 2017-08-10 | End: 2017-08-10 | Stop reason: HOSPADM

## 2017-08-10 RX ORDER — MEPERIDINE HYDROCHLORIDE 50 MG/ML
12.5 INJECTION INTRAMUSCULAR; INTRAVENOUS; SUBCUTANEOUS ONCE AS NEEDED
Status: DISCONTINUED | OUTPATIENT
Start: 2017-08-10 | End: 2017-08-10 | Stop reason: HOSPADM

## 2017-08-10 RX ORDER — DEXAMETHASONE SODIUM PHOSPHATE 4 MG/ML
INJECTION, SOLUTION INTRA-ARTICULAR; INTRALESIONAL; INTRAMUSCULAR; INTRAVENOUS; SOFT TISSUE
Status: DISCONTINUED | OUTPATIENT
Start: 2017-08-10 | End: 2017-08-10

## 2017-08-10 RX ORDER — KETOROLAC TROMETHAMINE 30 MG/ML
INJECTION, SOLUTION INTRAMUSCULAR; INTRAVENOUS
Status: DISCONTINUED | OUTPATIENT
Start: 2017-08-10 | End: 2017-08-10

## 2017-08-10 RX ORDER — FENTANYL CITRATE 50 UG/ML
25 INJECTION, SOLUTION INTRAMUSCULAR; INTRAVENOUS EVERY 5 MIN PRN
Status: DISCONTINUED | OUTPATIENT
Start: 2017-08-10 | End: 2017-08-10 | Stop reason: HOSPADM

## 2017-08-10 RX ORDER — FENTANYL CITRATE 50 UG/ML
INJECTION, SOLUTION INTRAMUSCULAR; INTRAVENOUS
Status: DISCONTINUED | OUTPATIENT
Start: 2017-08-10 | End: 2017-08-10

## 2017-08-10 RX ORDER — GLYCOPYRROLATE 0.2 MG/ML
INJECTION INTRAMUSCULAR; INTRAVENOUS
Status: DISCONTINUED | OUTPATIENT
Start: 2017-08-10 | End: 2017-08-10

## 2017-08-10 RX ORDER — PROPOFOL 10 MG/ML
VIAL (ML) INTRAVENOUS
Status: DISCONTINUED | OUTPATIENT
Start: 2017-08-10 | End: 2017-08-10

## 2017-08-10 RX ADMIN — MIDAZOLAM HYDROCHLORIDE 2 MG: 1 INJECTION, SOLUTION INTRAMUSCULAR; INTRAVENOUS at 06:08

## 2017-08-10 RX ADMIN — GLYCOPYRROLATE 0.2 MG: 0.2 INJECTION, SOLUTION INTRAMUSCULAR; INTRAVENOUS at 07:08

## 2017-08-10 RX ADMIN — DEXAMETHASONE SODIUM PHOSPHATE 8 MG: 4 INJECTION, SOLUTION INTRAMUSCULAR; INTRAVENOUS at 07:08

## 2017-08-10 RX ADMIN — OXYCODONE HYDROCHLORIDE 5 MG: 5 TABLET ORAL at 08:08

## 2017-08-10 RX ADMIN — PROPOFOL 180 MG: 10 INJECTION, EMULSION INTRAVENOUS at 07:08

## 2017-08-10 RX ADMIN — KETOROLAC TROMETHAMINE 30 MG: 30 INJECTION, SOLUTION INTRAMUSCULAR; INTRAVENOUS at 07:08

## 2017-08-10 RX ADMIN — ONDANSETRON 4 MG: 2 INJECTION, SOLUTION INTRAMUSCULAR; INTRAVENOUS at 07:08

## 2017-08-10 RX ADMIN — ACETAMINOPHEN 1000 MG: 10 INJECTION, SOLUTION INTRAVENOUS at 07:08

## 2017-08-10 RX ADMIN — LIDOCAINE HYDROCHLORIDE 100 MG: 20 INJECTION, SOLUTION INTRAVENOUS at 07:08

## 2017-08-10 RX ADMIN — CARBOXYMETHYLCELLULOSE SODIUM 2 DROP: 2.5 SOLUTION/ DROPS OPHTHALMIC at 07:08

## 2017-08-10 RX ADMIN — SODIUM CHLORIDE, SODIUM LACTATE, POTASSIUM CHLORIDE, AND CALCIUM CHLORIDE: 600; 310; 30; 20 INJECTION, SOLUTION INTRAVENOUS at 06:08

## 2017-08-10 RX ADMIN — FENTANYL CITRATE 100 MCG: 50 INJECTION, SOLUTION INTRAMUSCULAR; INTRAVENOUS at 07:08

## 2017-08-10 NOTE — OP NOTE
DATE OF PROCEDURE:  08/10/2017    PREOPERATIVE DIAGNOSIS:  Abnormal uterine bleeding.    POSTOPERATIVE DIAGNOSES:  1.  Abnormal uterine bleeding.  2.  Endometrial polyp.    PROCEDURES:  1.  Diagnostic hysteroscopy.  2.  Dilation and curettage.  3.  Hysteroscopic polypectomy.    SURGEON:  Ross Huerta M.D.    ASSISTANT:  Sushma Reddy, M.D.    ANESTHESIA:  General.    INDICATIONS:  The patient is a 51-year-old  3, para 2, who presents today   for D and C, hysteroscopy for the evaluation of abnormal uterine   bleeding / postmenopausal bleeding.  She describes being postmenopausal for about   2 years, except for 1 episode of spotting about 6 months ago.  However, about 6   weeks ago, she describes bleeding like a period, lasting 7 days in duration.    Since then, she has been having bleeding off and on, occurring every several   days.  Pelvic ultrasound on 06/15/2017 showed the uterus to be 8.1 x 4.9 x 6.9   cm with an endometrial stripe measuring 9 mm.  There was 4.8 cm left posterior   intramural fibroid.  Also, there was a 2.2 cm right ovarian nodule.  She   underwent MRI for further evaluation of the ovary.  MRI on 07/15/2017 showed a   13-mm endometrial stripe with the 4.7 cm intramural fibroid.  The left ovary was   normal size.  The right ovary was normal size as well and contained a 3 cm   cystic lesion.  The previously seen nodule in the right ovary was not   identified.  She had undergone endometrial biopsy 2017, which showed   weakly proliferative endometrium.  Pap 2017 was negative.  Cervical   cultures 2017 were negative.  We discussed her abnormal / postmenopausal   bleeding with a thickened endometrial stripe and recommendations for further   evaluation with D and C, hysteroscopy, possible polypectomy.  We reviewed all   risks and benefits of the planned procedure including, but not limited to such   things as injury to organs of the lower pelvic region such as bowel, bladder,    uterus, tubes, ovaries, ureters, bleeding, infection, hemorrhage, pain,   scarring, uterine perforation, deep vein thrombosis, pulmonary embolus,   transfusion, hysterectomy, etc.  Questions have been answered and consents   signed and witnessed.    PROCEDURE IN DETAIL:  Having obtained adequate informed consent, the patient was   taken to the Operating Room.  She was placed on the operating room table.    After an adequate level of general anesthesia was obtained, she was placed in   dorsal lithotomy position in [facility].  Examination under anesthesia   revealed the uterus to be basically normal size.  No adnexal masses were   appreciated.  Vulva, vagina and perineum were prepped and draped in the usual   sterile manner.  The bladder was emptied with an in-and-out catheter.  The 2   handheld right angle retractors were placed in the vagina.  The cervix was   visualized.  The anterior lip of the cervix was grasped with single-tooth   tenaculum.  The uterus sounded to 8 cm.  Next, serial dilatation with Hegar   dilators was performed to a #6 size.  The diagnostic hysteroscope was then   inserted through the cervix into the endometrial cavity.  Normal saline was used   as distensing medium.  The endometrial cavity distended well.  There was an   endometrial polyp noted to be arising from the patient's left uterine wall.  The   remainder of the endometrial cavity was inspected and noted to be fairly   atrophic in appearance.  Both tubal ostia were easily visualized.  We now turned   to resection of the polyp using the Truclear resecting device, which was   inserted through the hysteroscope.  The polyp was easily resected under hysteroscopic   guidance.  We then reinspected the endometrial cavity and again noted no   remaining Pathology.  The hysteroscope was removed.  A gentle curettage was   performed in a circumferential manner with recovery of minimal tissue.  The   hysteroscope was then reinserted and  the cavity distended well.  There was no   pathology noted.  At this point, we were satisfied with completion of the   procedure.  The hysteroscope was removed.  The single-tooth tenaculum was   removed from the cervix was hemostatic.  The patient was transferred from dorsal   lithotomy to supine position.  She was awoken and taken to the Recovery Room.    Estimated blood loss was minimal.  There were no complications.  All counts were   correct.  Normal saline was used as a distensing medium with a deficit of 120   mL.      ANIBAL/  dd: 08/10/2017 08:53:49 (CDT)  td: 08/10/2017 13:26:44 (CDT)  Doc ID   #[id number]  Job ID #[id number]    CC:

## 2017-08-10 NOTE — PLAN OF CARE
Patient prefers to have  Luis Antonio spouse present for discharge teaching. Please contact them @ [phone].

## 2017-08-10 NOTE — ANESTHESIA POSTPROCEDURE EVALUATION
"Anesthesia Post Evaluation    Patient: Cathleen Saba    Procedure(s) Performed: Procedure(s) (LRB):  [id number] - possible polypectomy (N/A)    Final Anesthesia Type: general  Patient location during evaluation: PACU  Patient participation: Yes- Able to Participate  Level of consciousness: awake and alert and oriented  Post-procedure vital signs: reviewed and stable  Pain management: adequate  Airway patency: patent  PONV status at discharge: No PONV  Anesthetic complications: no      Cardiovascular status: blood pressure returned to baseline and hemodynamically stable  Respiratory status: unassisted, spontaneous ventilation and room air  Hydration status: euvolemic  Follow-up not needed.        Visit Vitals  /60   Pulse 62   Temp 36.4 °C (97.5 °F) (Oral)   Resp 16   Ht 5' 9" (1.753 m)   Wt 117.9 kg (260 lb)   LMP 09/26/2013   SpO2 99%   Breastfeeding? No   BMI 38.40 kg/m²       Pain/Cristal Score: Pain Assessment Performed: Yes (8/10/2017  6:06 AM)  Presence of Pain: denies (8/10/2017  8:20 AM)  Pain Rating Prior to Med Admin: 1 (8/10/2017  8:05 AM)  Cristal Score: 10 (8/10/2017  8:20 AM)      "

## 2017-08-10 NOTE — DISCHARGE INSTRUCTIONS
Anesthesia: After Your Surgery  Youve just had surgery. During surgery, you received medication called anesthesia to keep you comfortable and pain-free. After surgery, you may experience some pain or nausea. This is common. Here are some tips for feeling better and recovering after surgery.    Going home  Your doctor or nurse will show you how to take care of yourself when you go home. He or she will also answer your questions. Have an adult family member or friend drive you home. For the first 24 hours after your surgery:  · Do not drive or use heavy equipment.  · Do not make important decisions or sign legal documents.  · Avoid alcohol.  · Have someone stay with you, if needed. He or she can watch for problems and help keep you safe.  Be sure to keep all follow-up appointments with your doctor. And rest after your procedure for as long as your doctor tells you to.    Coping with pain  If you have pain after surgery, pain medication will help you feel better. Take it as directed, before pain becomes severe. Also, ask your doctor or pharmacist about other ways to control pain, such as with heat, ice, and relaxation. And follow any other instructions your surgeon or nurse gives you.    Tips for taking pain medication  To get the best relief possible, remember these points:  · Pain medications can upset your stomach. Taking them with a little food may help.  · Most pain relievers taken by mouth need at least 20 to 30 minutes to take effect.  · Taking medication on a schedule can help you remember to take it. Try to time your medication so that you can take it before beginning an activity, such as dressing, walking, or sitting down for dinner.  · Constipation is a common side effect of pain medications. Contact your doctor before taking any medications like laxatives or stool softeners to help relieve constipation. Also ask about any dietary restrictions, because drinking lots of fluids and eating foods like fruits  and vegetables that are high in fiber can also help. Remember, dont take laxatives unless your surgeon has prescribed them.  · Mixing alcohol and pain medication can cause dizziness and slow your breathing. It can even be fatal. Dont drink alcohol while taking pain medication.  · Pain medication can slow your reflexes. Dont drive or operate machinery while taking pain medication.  If your health care provider tells you to take acetaminophen to help relieve your pain, ask him or her how much you are supposed to take each day. (Acetaminophen is the generic name for Tylenol and other brand-name pain relievers.) Acetaminophen or other pain relievers may interact with your prescription medicines or other over-the-counter (OTC) drugs. Some prescription medications contain acetaminophen along with other active ingredients. Using both prescription and OTC acetaminophen for pain can cause you to overdose. The FDA recommends that you read the labels on your OTC medications carefully. This will help you to clearly understand the list of active ingredients, dosing instructions, and any warnings. It may also help you avoid taking too much acetaminophen. If you have questions or don't understand the information, ask your pharmacist or health care provider to explain it to you before you take the OTC medication.    Managing nausea  Some people have an upset stomach after surgery. This is often due to anesthesia, pain, pain medications, or the stress of surgery. The following tips will help you manage nausea and get good nutrition as you recover. If you were on a special diet before surgery, ask your doctor if you should follow it during recovery. These tips may help:  · Dont push yourself to eat. Your body will tell you when to eat and how much.  · Start off with clear liquids and soup. They are easier to digest.  · Progress to semi-solid foods (mashed potatoes, applesauce, and gelatin) as you feel ready.  · Slowly move to  solid foods. Dont eat fatty, rich, or spicy foods at first.  · Dont force yourself to have three large meals a day. Instead, eat smaller amounts more often.  · Take pain medications with a small amount of solid food, such as crackers or toast to avoid nausea.      Call your surgeon if  · You still have pain an hour after taking medication (it may not be strong enough).  · You feel too sleepy, dizzy, or groggy (medication may be too strong).  · You have side effects like nausea, vomiting, or skin changes (rash, itching, or hives).   © 9129-4346 [employer]. [address] [place] [address], [place] [address]. All rights reserved. This information is not intended as a substitute for professional medical care. Always follow your healthcare professional's instructions.                Home Care Instruction D&C Hysteroscopy             ACTIVITY LEVEL:  If you received sedation and/or an anesthetic, you may feel sleepy for several hours. Rest until you feel more  awake. Gradually resume your normal activities.    DIET:  At home, continue with liquids. If there is no nausea, you may eat a soft diet and gradually resume a regular diet.    BATHING:  You may shower as desired in one day.    CARE:  You can expect watery or bloody vaginal discharge for several days. Do not use tampons, please only use pads. Sexual activity is restricted as advised by your doctor.    MEDICATIONS:  You will receive instructions for any pain and/or antibiotic prescriptions. Pain medication should be taken only if needed and as directed.  ADDITIONAL INFORMATION:  __________________________________________________________________________________________  WHEN TO CALL THE DOCTOR:   For any heavy vaginal bleeding   Fever over 101°F (38.4°C)   Any lower abdominal pain not relieved by the pain mediation  RETURN APPOINTMENT:  __________________________________________________________________________________________

## 2017-08-10 NOTE — TRANSFER OF CARE
"Anesthesia Transfer of Care Note    Patient: Cathleen Saba    Procedure(s) Performed: Procedure(s) (LRB):  [id number] - possible polypectomy (N/A)    Patient location: PACU    Anesthesia Type: general    Transport from OR: Transported from OR on 2-3 L/min O2 by NC with adequate spontaneous ventilation    Post pain: adequate analgesia    Post assessment: no apparent anesthetic complications    Post vital signs: stable    Level of consciousness: awake and alert    Nausea/Vomiting: no nausea/vomiting    Complications: none    Transfer of care protocol was followed      Last vitals:   Visit Vitals  /74 (BP Location: Right arm, Patient Position: Lying)   Pulse 82   Temp 36.3 °C (97.4 °F) (Oral)   Resp 16   Ht 5' 9" (1.753 m)   Wt 117.9 kg (260 lb)   LMP 09/26/2013   SpO2 100%   Breastfeeding? No   BMI 38.40 kg/m²     "

## 2017-08-10 NOTE — PLAN OF CARE
Cathleen Saba has met all discharge criteria from Phase II. Vital Signs are stable, ambulating  without difficulty. Discharge instructions given, patient verbalized understanding. Discharged from facility via wheelchair in stable condition.

## 2017-08-10 NOTE — BRIEF OP NOTE
[facility]  Brief Operative Note     SUMMARY     Surgery Date: 8/10/2017     Surgeon(s) and Role:     * Ross Huerta MD - Primary    Assisting Surgeon:      * Sushma K. Reddy, MD - Resident    Pre-op Diagnosis:  Postmenopausal bleeding [N95.0]    Post-op Diagnosis:  Post-Op Diagnosis Codes:     * Postmenopausal bleeding [N95.0]    Procedure(s) (LRB):  [id number] - possible polypectomy (N/A)    Anesthesia: General    Findings/Key Components:   1. No abnormal findings of the vulva, introitus, vagina or cervix.  2. Uterus sounded to 8 cm and dilated to 6 with Sara dilators  3. Hysteroscopy revealed moderately sized intrauterine polyp in the left lateral uterus, as well as very atrophic-appearing endometrium.  4. Polypectomy was performed using 5 mm resectoscope. Specimen sent to pathology.  5. D&C was performed. Specimen sent to pathology.  6. Hemostasis of the cervix noted following procedure.    Estimated Blood Loss: * No values recorded between 8/10/2017  7:17 AM and 8/10/2017  7:47 AM *         Specimens:   Specimen (12h ago through future)    Start     Ordered    08/10/17 0741  Specimen to Pathology - Surgery  Once     Comments:  1. Endometrial polyps2. Endometrial scrappings      08/10/17 0741          Discharge Note    SUMMARY     Admit Date: 8/10/2017    Discharge Date and Time:  08/10/2017 8:01 AM    Hospital Course (synopsis of major diagnoses, care, treatment, and services provided during the course of the hospital stay): Patient presented for scheduled procedure. Please see OP note for further details. Tolerated procedure well and patient was taken to recovery in a stable condition. Prior to discharge patient was able to void, ambulate, tolerate PO and pain was well controlled with PO meds. Patient was given routine post-op instructions as well as follow up appointment in 4 weeks, for which patient voiced understanding. Patient was subsequently discharged  home.     Final Diagnosis: Post-Op Diagnosis Codes:     * Postmenopausal bleeding [N95.0]    Disposition: Home or Self Care    Follow Up/Patient Instructions:     Medications:  Reconciled Home Medications:   Current Discharge Medication List      START taking these medications    Details   ibuprofen (ADVIL,MOTRIN) 600 MG tablet Take 1 tablet (600 mg total) by mouth every 6 (six) hours as needed for Pain.  Qty: 60 tablet, Refills: 0         CONTINUE these medications which have NOT CHANGED    Details   ACETAMINOPHEN/DP-HYDRAMINE (EXCEDRIN PM ORAL) Take by mouth daily as needed.              Discharge Procedure Orders  Diet general     Activity as tolerated     Other restrictions (specify):   Order Comments: Pelvic rest for 1-2 weeks. Nothing in vagina -no sex, tampons, douching, etc.     Call MD for:  temperature >100.4     Call MD for:  persistent nausea and vomiting or diarrhea     Call MD for:  severe uncontrolled pain     Call MD for:  difficulty breathing or increased cough     Call MD for:  persistent dizziness, light-headedness, or visual disturbances     Call MD for:  severe persistent headache     Call MD for:  worsening rash     Call MD for:  increased confusion or weakness     Call MD for:   Order Comments: Vaginal bleeding, soaking 2 or more pads an hour for 2 or more hours       Follow-up Information     Ross Huerta MD In 4 weeks.    Specialties:  Obstetrics, Obstetrics and Gynecology  Contact information:  [address] [id number] [phone]

## 2017-08-14 ENCOUNTER — DOCUMENTATION ONLY (OUTPATIENT)
Dept: BARIATRICS | Facility: CLINIC | Age: 52
End: 2017-08-14

## 2017-08-14 NOTE — PROGRESS NOTES
Bariatric Surgery Online Course Form Submission  Someone has submitted a Bariatric Surgery Online Course Form Submission on this page.  Date: 2017-08-13 - 19:44  Patient's Name: Cathleen Saba  YOB: 1965 Email: [email or web address]  Phone: [phone]   Patient Address: [address] [place] [address]  Preferred Surgical Location: [facility]   I certify that I am 18 years of age or older:   Confirmation Code: [id number]  Verification of Bariatric Seminar: y  Insurance Information  Insurance or Self Pay?   Insurance Company Name: [employer] [facility]   Type of Coverage/Coverage Plan (i.e. PPO, HMO): PPO   Group Name: [id number]   Subscriber Name: CATHLEEN SABA   Member Name (patient's name)   Member ID/Policy #:[id number]   Plan Effective Date:   Card Issuance date:

## 2017-08-15 ENCOUNTER — PATIENT MESSAGE (OUTPATIENT)
Dept: OBSTETRICS AND GYNECOLOGY | Facility: CLINIC | Age: 52
End: 2017-08-15

## 2017-08-23 ENCOUNTER — OFFICE VISIT (OUTPATIENT)
Dept: BARIATRICS | Facility: CLINIC | Age: 52
End: 2017-08-23
Payer: COMMERCIAL

## 2017-08-23 VITALS
HEART RATE: 71 BPM | WEIGHT: 260.81 LBS | BODY MASS INDEX: 40.93 KG/M2 | HEIGHT: 67 IN | DIASTOLIC BLOOD PRESSURE: 90 MMHG | SYSTOLIC BLOOD PRESSURE: 126 MMHG

## 2017-08-23 DIAGNOSIS — E66.01 MORBID OBESITY WITH BMI OF 40.0-44.9, ADULT: ICD-10-CM

## 2017-08-23 DIAGNOSIS — Z98.84 STATUS POST LAPAROSCOPIC SLEEVE GASTRECTOMY: ICD-10-CM

## 2017-08-23 DIAGNOSIS — E66.9 OBESITY, UNSPECIFIED CLASSIFICATION, UNSPECIFIED OBESITY TYPE, UNSPECIFIED WHETHER SERIOUS COMORBIDITY PRESENT: Primary | ICD-10-CM

## 2017-08-23 DIAGNOSIS — F32.A DEPRESSION, UNSPECIFIED DEPRESSION TYPE: ICD-10-CM

## 2017-08-23 DIAGNOSIS — M79.7 FIBROMYALGIA: ICD-10-CM

## 2017-08-23 PROCEDURE — 3008F BODY MASS INDEX DOCD: CPT | Mod: S$GLB,,, | Performed by: PHYSICIAN ASSISTANT

## 2017-08-23 PROCEDURE — 99999 PR PBB SHADOW E&M-EST. PATIENT-LVL IV: CPT | Mod: PBBFAC,,, | Performed by: PHYSICIAN ASSISTANT

## 2017-08-23 PROCEDURE — 99205 OFFICE O/P NEW HI 60 MIN: CPT | Mod: S$GLB,,, | Performed by: PHYSICIAN ASSISTANT

## 2017-08-23 NOTE — PROGRESS NOTES
BARIATRIC NEW PATIENT EVALUATION    CHIEF COMPLAINT:   Morbid obesity, Body mass index is 40.85 kg/m². and inability to lose weight.    HPI:  Cathleen Saba is a 51 y.o. female presenting for morbid obesity, Body mass index is 40.85 kg/m². and inability to lose weight. The patient has tried exercise, meditteranean diet, low starch diet and diet modification.  The most weight lost was 110 lbs with diet modification and exercise.  She has found that low fat helps her lose more weight.  She has been overweight since, a young teenager.  Her grandmother, mother, and sister are also obese.  Her highest weight was 325 lbs.  Her challenges are stress/emotional eating.  She wants a Sleeve with Dr. Chavez.    PAST MEDICAL HISTORY:  Past Medical History:   Diagnosis Date    Arthritis     Fibromyalgia          PAST SURGICAL HISTORY:  Past Surgical History:   Procedure Laterality Date    APPENDECTOMY       SECTION      DILATION AND CURETTAGE OF UTERUS      TONSILLECTOMY         FAMILY HISTORY:  Family History   Problem Relation Age of Onset    Heart disease Mother     Arthritis Mother      RHEUMATOID    Heart disease Father     COPD Father     Arthritis Father      RHEUMATOID          SOCIAL HISTORY:   reports that she has never smoked. She has never used smokeless tobacco. She reports that she drinks alcohol. She reports that she does not use drugs.  She works full time as an .  She is  and has 3 kids and 6 grandkids.      MEDICATIONS:  Medications have been reviewed.    ALLERGIES:  Allergies have been reviewed.    Review of Systems   Constitutional: Negative for chills, fever and malaise/fatigue.   Eyes: Negative for blurred vision and double vision.   Respiratory: Negative for cough, hemoptysis and shortness of breath.    Cardiovascular: Negative for chest pain, palpitations and leg swelling.   Gastrointestinal: Positive for abdominal pain (s/p D&C more pelvic pain). Negative for  blood in stool, constipation, diarrhea, heartburn, melena, nausea and vomiting.   Genitourinary: Negative for dysuria and hematuria.   Musculoskeletal: Positive for back pain, joint pain (knees, arms) and myalgias. Negative for falls and neck pain.   Skin: Negative for rash.   Neurological: Negative for dizziness, tingling, weakness and headaches.   Endo/Heme/Allergies: Negative for environmental allergies. Does not bruise/bleed easily.   Psychiatric/Behavioral: Positive for depression. Negative for hallucinations, memory loss, substance abuse and suicidal ideas. The patient has insomnia. The patient is not nervous/anxious.        Vitals:    08/23/17 1411   BP: (!) 126/90   Pulse: 71       Physical Exam   Constitutional: She is oriented to person, place, and time and well-developed, well-nourished, and in no distress.   Morbidly obese   HENT:   Head: Normocephalic and atraumatic.   Eyes: No scleral icterus.   Cardiovascular: Normal rate, regular rhythm, normal heart sounds and intact distal pulses.  Exam reveals no gallop and no friction rub.    No murmur heard.  Pulmonary/Chest: Effort normal and breath sounds normal. No respiratory distress. She has no wheezes. She has no rales. She exhibits no tenderness.   Abdominal: Soft. Bowel sounds are normal. She exhibits no distension and no mass. There is no tenderness. There is no rebound and no guarding.   Musculoskeletal: She exhibits no edema.   Neurological: She is alert and oriented to person, place, and time.   Skin: Skin is warm and dry. No rash noted. No erythema. No pallor.   Psychiatric: Mood, memory, affect and judgment normal.   Nursing note and vitals reviewed.      DIAGNOSIS:  1. Morbid Obesity, Body mass index is 40.85 kg/m². and inability to lose weight.  2. Co-morbidities: depression    PLAN:  The patient is a good candidate for Bariatric Surgery. The patient is interested in sleeve gastrectomy. The surgery and post-op care was discussed in detail with  the patient. All questions were answered.    The patient understands that bariatric surgery is a tool to aid in weight loss and that they need to be committed to the diet and exercise post-operatively for successful weight loss. Discussed with patient that bariatric surgery is not the easy way out and that it will take plenty of dedication on the patient's part to be successful. Also discussed the possibility of weight regain if the patient strays from the diet guidelines or exercise requirements. Patient verbalized understanding and wishes to proceed with the work-up.    Estimated Goal weight is 203 lbs, approximately 50% of their excess weight.      **I certify that I have personally reviewed the patient's blue intake packet with the patient.  All information has been added into epic.        ORDERS:  1. Chest X-Ray and EKG  2. Psychological Consult, Bariatric Dietician Consult and PCP Clearance  3. Bariatric Labs: BMP, CBC, Folate Serum, H. pylori, HgA1C, Hepatic Panel/LFT, Iron & TIBC, Lipid Profile, Magnesium, Phosphate, T3, T4, TSH, Free T4, Vitamin B12, and Vitamin B1.    Referring Physician: Cami Chaudhry MD  RTC: As scheduled.

## 2017-08-23 NOTE — PATIENT INSTRUCTIONS
"The following tests will be required for your Bariatric Surgery work-up.  There may be additional tests added.      - Lab work  - Chest X-ray  - EKG  - Nutrition Consult and Clearance.    - Psychological Consult and Clearance (Call the Psychiatry Department to schedule a Bariatric Surgery Clearance Appointment with Dr. Menard at [phone])  - Primary Care Doctor Clearance (This needs to be done within 30 days of surgery date)    - Try a "sit and get fit" video on youtube.  Also try searching for full body workout for knee pain or fibromyalgia.  Start with 15-20 minutes and work up to 1 hr.        In preparation for bariatric surgery, please complete the following:   · Discuss your current medications with your primary care provider, remember your medications will need to be crushed, chewable, or in liquid form for the first 3 months after a Sleeve.    · If you take a blood thinner such as: Coumadin (warfarin), Pradaxa (dabigatran), or Plavix (clopidogrel), you will need to speak with your prescribing provider on how or if this medication can be stopped before surgery.   · If you take a medication for depression or anxiety, you will need to begin crushing or opening the capsule 1-3 months prior to surgery.  Remember to discuss this with the psychologist or psychiatrist that you see.   · If you take medication for arthritis on a daily basis that is considered a non-steroidal anti-inflammatory (NSAID), please discuss with your prescribing physician an alternative medication.  After having gastric bypass or gastric sleeve, this group of medications is not appropriate to take due to increased risk of bleeding stomach ulcers.      NO SHOW POLICY      DEFINITIONS  Appointments: Pre-scheduled meetings or consultations with any physician, advanced practice provider, dietitian, or psychologist, and labs, imaging studies, sleep studies, etc.   Late cancellation: Cancelling an appointment 24-48 hours prior to scheduled " time.  No-Show appointment:  is when    You do NOT arrive to your appointment at the time its scheduled.   You call to cancel or cancel via cheerappner less than 24 hours in advance of your scheduled appointment   You show up 15 minutes AFTER your scheduled appointment time without any notification of being late.     POLICY  1. You are allowed up to 3 cancellations for appointments.    After 3 cancellations your case will be placed on hold for 2 months and after that time you can resume the program.   2. You are allowed only 1 no-show for an appointment.    You will be re-scheduled one time and if there is a 2nd no-show at any point, your case will be placed on hold for 3 months.  After 3 months you can resume the program.     3. Upon resuming the program after being placed on hold for either above mentioned reasons, if you have a late cancel or no show for any appointment, the bariatric team will review if youre an appropriate candidate for surgery at the monthly meeting.

## 2017-09-07 ENCOUNTER — CLINICAL SUPPORT (OUTPATIENT)
Dept: BARIATRICS | Facility: CLINIC | Age: 52
End: 2017-09-07
Payer: COMMERCIAL

## 2017-09-07 ENCOUNTER — HOSPITAL ENCOUNTER (OUTPATIENT)
Dept: RADIOLOGY | Facility: HOSPITAL | Age: 52
Discharge: HOME | End: 2017-09-07
Attending: PHYSICIAN ASSISTANT
Payer: COMMERCIAL

## 2017-09-07 ENCOUNTER — HOSPITAL ENCOUNTER (OUTPATIENT)
Dept: CARDIOLOGY | Facility: CLINIC | Age: 52
Discharge: HOME | End: 2017-09-07
Payer: COMMERCIAL

## 2017-09-07 VITALS — WEIGHT: 263.44 LBS | BODY MASS INDEX: 41.26 KG/M2

## 2017-09-07 DIAGNOSIS — M79.7 FIBROMYALGIA: ICD-10-CM

## 2017-09-07 DIAGNOSIS — E66.01 MORBID OBESITY DUE TO EXCESS CALORIES: ICD-10-CM

## 2017-09-07 DIAGNOSIS — F32.A DEPRESSION, UNSPECIFIED DEPRESSION TYPE: ICD-10-CM

## 2017-09-07 DIAGNOSIS — E66.01 MORBID OBESITY WITH BMI OF 40.0-44.9, ADULT: ICD-10-CM

## 2017-09-07 DIAGNOSIS — Z71.3 DIETARY COUNSELING AND SURVEILLANCE: ICD-10-CM

## 2017-09-07 PROCEDURE — 71020 XR CHEST PA AND LATERAL: CPT | Mod: TC

## 2017-09-07 PROCEDURE — 97802 MEDICAL NUTRITION INDIV IN: CPT | Mod: S$GLB,,, | Performed by: DIETITIAN, REGISTERED

## 2017-09-07 PROCEDURE — 99499 UNLISTED E&M SERVICE: CPT | Mod: S$GLB,,, | Performed by: DIETITIAN, REGISTERED

## 2017-09-07 PROCEDURE — 93000 ELECTROCARDIOGRAM COMPLETE: CPT | Mod: S$GLB,,, | Performed by: INTERNAL MEDICINE

## 2017-09-07 PROCEDURE — 71020 XR CHEST PA AND LATERAL: CPT | Mod: 26,,, | Performed by: RADIOLOGY

## 2017-09-07 PROCEDURE — 99999 PR PBB SHADOW E&M-EST. PATIENT-LVL II: CPT | Mod: PBBFAC,,, | Performed by: DIETITIAN, REGISTERED

## 2017-09-07 NOTE — PROGRESS NOTES
NUTRITIONAL CONSULT    Referring Physician: Serge Chavez M.D.  Reason for MNT Referral: Initial assessment for sleeve gastrectomy work-up    PAST MEDICAL HISTORY:   51 y.o. female presents with a BMI of Body mass index is 41.26 kg/m².  Weight history includes she has been overweight since, a young teenager.  Her grandmother, mother, and sister are also obese.  Her highest weight was 334 lbs. Her challenges are stress/emotional eating, not a breakfast person.   Dieting attempts include the patient has tried exercise, meditteranean diet, low starch diet and diet modification. The most weight lost was 110 lbs with diet modification and exercise.  She has found that low fat helps her lose more weight.     Past Medical History:   Diagnosis Date    Arthritis     Fibromyalgia      CLINICAL DATA:  51 y.o.-year-old White female.  Height: 5 ft 7 in  Weight: 263 lbs  IBW: 147 lbs  BMI: 41.26  The patient's goal weight (50% EBW): 205 lbs  Personal goal weight: ~175 lbs    Goal for Bariatric Surgery: to improve health, to improve quality of life, to lose weight and to prevent future medical conditions    NUTRITION & HEALTH HISTORY:  Greatest challenge: starchy CHO, portion control and emotional eating    Current diet recall:   Brk: wheat toast with PB   Snk (10am): baked Cheetos   Vanessa: tuna fish OR Healthy Choice meal  Snk (3 pm): BB muffin OR celery OR baked Cheetos  Di: Grilled pork chops, baked fish or chicken with sweet potatoes fries OR salads  Does not eat past 7-8 pm    Current Diet:  Meal pattern: 3 meals/day +1-2 snacks  Protein supplements: none  Snackin-2 / day  Vegetables: Likes a variety. Eats almost daily.  Fruits: Likes a variety. Eats 2-3 times per week.  Beverages: diet soda, sugar-free beverages and diet tea  Dining out: Daily. Weekly. (4 times per week) Mostly restaurants and take-out. (baked fish or chicken OR salad)  Cooking at home: Weekly. (2x/week) Mostly baked and grilled meat, fish, starchy  CHO and vegetables.    Exercise:  Past exercise: Adequate    Current exercise: None  Restrictions to exercise: none    Vitamins / Minerals / Herbs:   None    Food Allergies:   No known    Social:  Works regular daytime shifts.  Lives with .  Grocery shopping and food prep done by patient.  Patient believes the household will be supportive after surgery.  Alcohol: None.  Smoking: None.    ASSESSMENT:  · Patient reports attempts at weight loss, only to regain lost weight.  · Patient demonstrated knowledge of healthy eating behaviors and exercise patterns; admits to not eating healthy and not exercising at this point.  · Patient states willingness and demonstrates willingness to change lifestyle and make behavior modifications.  · Expect good  compliance after surgery at this time.    Insurance requires NO medically supervised diet prior to consideration for bariatric surgery.    BARIATRIC DIET DISCUSSION:  Discussed diet after surgery and related to patients food record.  Reviewed diet progression before and after surgery.  Reinforced that surgery is not a magic bullet and importance of low fat foods and no snacking.  Stressed importance of exercise and its role in achieving weight loss goals.  Answered all questions.    RECOMMENDATIONS:  Patient is a potential candidate for bariatric surgery-sleeve.    Needs additional visit(s) with RD for dietary modifications in preparation for bariatric surgery.    PLAN:  Continue to review Bariatric Nutrition Guidebook at home and call with any questions.  Work on Bariatric Nutrition Checklist.  Work on expanding variety of vegetables.  Work on gradually cutting back on starchy CHO in the diet.  Begin trying various protein supplements to determine preference.  1200-calorie diet.  5-6 meals per day.  Start including protein supplements in the diet plan daily.  Reduce frequency of dining out.  More grocery shopping and meal preparation at home.  Increase exercise.  Return  to clinic.    SESSION TIME:  45 minutes

## 2017-09-07 NOTE — PATIENT INSTRUCTIONS
1200- 1500 calorie Sample meal plan  80-120g protein per day.   Protein drinks and bars: 0-4 grams sugar  Drink lots of water throughout the day and exercise!  MENU # 1  Breakfast: 2 eggs, 1 turkey sausage kaushal, 1 apple  Snack: Atkins bar  Lunch: 2 roll-ups (sliced turkey, low-fat sliced cheese, mustard), 12 baby carrots dipped in 1 Tbsp natural peanut butter  Mid-Day Snack: ¼ cup unsalted almonds, ½ cup fruit  Dinner: 1 chicken thigh simmered in tomato sauce + 2 Tbsp mozzarella cheese, ½ cup black beans, 1/2 cup steamed carrots  Evening Snack: 1/2 cup grapes with 4 cubes low-fat cheddar cheese   ___________________________________________________  MENU # 2  Breakfast: 200 calorie protein drink  Mid-morning snack : ¼ cup unsalted nuts, medium banana  Lunch: 3oz tuna or chicken salad made with 2 tbsp light andres, over salad with tomatoes and cucumbers.   Snack: low-fat cheese stick  Dinner: 3oz hamburger kaushal, slice of low-fat cheese, 1 cup boiled yellow squash and zucchini.   Snack: 6oz light yogurt  _______________________________________________________  Menu #3  Breakfast: 6oz plain Greek yogurt + fruit (½ banana, ½ cup fruit - pineapple, blueberries, strawberries, peach), may add Splenda to meron.  Lunch: Grilled  chicken breast w/ slice low-fat pepper deniz cheese, 1/2 cup grilled/baked asparagus and small salad with Salad Spritzer.    Mid-Day snack: 200 calorie protein drink   Dinner: 4oz Grilled fish, ½ cup white beans, ½ cup cooked spinach  Evening Snack: fudgsicle no-sugar-added    Menu # 4  Breakfast: 1 scoop vanilla protein powder + 4oz skim milk + 4oz coffee   Snack: Pure protein bar  Lunch: 2 Lettuce tacos: 3oz seasoned ground turkey wrapped in a Mason lettuce leaves with 1 Tbsp shredded cheese and dollop low-fat sour cream  Snack: ½ cup cottage cheese, ½ cup pineapple chunks  Dinner: Shrimp omelet: 2 eggs, ½ cup shrimp, green onions, and shredded  cheese  ______________________________________________________  Menu #5  Breakfast: 1 cup low-fat cottage cheese, ½ cup peaches (no sugar added)  Snack: 1 apple, 4 cubes cheese  Lunch: 3oz baked pork chop, 1 cup okra and tomato stew  Snack: 1/2 cup black beans + salsa + dollop light sour cream  Dinner: Caprese chicken salad: 3 oz chicken breast, 1oz fresh mozzarella, sliced tomato, 1 Tbsp olive oil, basil  Snack: sugar-free popsicle    Menu #6  Breakfast: ½ cup part-skim ricotta cheese, 2 Tbsp sugar-free strawberry preserves, sprinkle of slivered almonds  Snack: 1 orange  Lunch: 3 oz canned chicken, 1oz shredded cheddar cheese, ½  cup black beans, 2 Tbsp salsa  Snack: 200 calorie Protein drink  Dinner: 4 oz grilled salmon steak, over mixed green salad with 2 tbsp light dressing    Meal Ideas for Regular Bariatric Diet  *Recipes and products available at [email or web address]      Breakfast: (15-20g protein)    - Egg white omelet: 2 egg whites or ½ cup Egg Beaters. (Optional proteins: cheese, shrimp, black beans, chicken, sliced turkey) (Optional veggies: tomatoes, salsa, spinach, mushrooms, onions, green peppers, or small slice avocado)     - Egg and sausage: 1 egg or ¼ cup Egg Beaters (any variety), with 1 kaushal or 2 links of Turkey sausage or Veggie breakfast sausage (Amplimmune or "Hey, Neighbor!")    - Crust-less breakfast quiche: To make a glass pie dish, mix 4oz part skim Ricotta, 1 cup skim milk, and 2 eggs as your base. Add protein: shredded cheese, sliced lean ham or turkey, turkey hernandez/sausage. Add veggies: tomato, onion, green onion, mushroom, green pepper, spinach, etc.    - Yogurt parfait: Mix 1 - 6oz container Dannon Light N Fit vanilla yogurt, with ¼ cup crushed unsalted nuts    - Cottage cheese and fruit: ½ cup part-skim cottage cheese or ricotta cheese topped with fresh fruit or sugar free preserves     - Nkechi Aguiar's Vanilla Egg custard* (add 2 Tbsp instant coffee granules to make Cappuccino  Custard*)    - Hi-Protein café latte (skim milk, decaf coffee, 1 scoop protein powder). Optional to add Sugar free syrup or extract flavoring.    - Breakfast Lox: spread fat free cream cheese on slices of smoked salmon. Serve over scrambled or egg over easy (sauteed with nonstick cookspray) OR on a cucumber slice    - Eggwhich: Scramble or cook 1 large egg over easy using nonstick cookspray. Place between 2 slices of [place] hernandez and low fat cheese.     Lunch: (20-30g protein)    - ½ cup Black bean soup (Homemade or Progresso), with ¼ cup shredded low-fat cheese. Top with chopped tomato or fresh salsa.     - Lean deli turkey breast and low-fat sliced cheese, mustard or light andres to moisten, rolled up together, or wrapped in a Mason lettuce leaf    - Chicken salad made from dinner leftovers, moisten with low-fat salad dressing or light andres. Also try leftover salmon, shrimp, tuna or boiled eggs. Serve ½ cup over dark green salad    - Fat-free canned refried beans, topped with ¼ cup shredded low-fat cheese. Top with chopped tomato or fresh salsa.     - Greek salad: Top mixed greens with 1-2oz grilled chicken, tomatoes, red onions, 2-3 kalamata olives, and sprinkle lightly with feta cheese. Spritz with Balsamic vinegar to taste.     - Crust-less lunch quiche: To make a glass pie dish, mix 4oz part skim Ricotta, 1 cup skim milk, and 2 eggs as your base. Add protein: shredded cheese, sliced lean ham or turkey, shrimp, chicken. Add veggies: tomato, onion, green onion, mushroom, green pepper, spinach, artichoke, broccoli, etc.    - Pizza bake: spread a  kayleen andrew mushroom with tomato sauce, low-fat shredded mozzarella and turkey pepperoni or [place] hernandez. Add any veggies. Roast for 10-15 minutes, until cheese melted.     - Cucumber crab bites: Spread ¼ cup crab dip (lump crabmeat + light cream cheese and green onions) over sliced cucumber.     - Chicken with light spinach and artichoke dip*: Puree in food  processor: 6oz cooked and drained spinach, 2 cloves garlic, 1 can cannelloni beans, ½ cup chopped green onions, 1 can drained artichoke hearts (not marinated in oil), lemon juice and basil. Mix in 2oz chopped up chicken.    Supper: (20-30g protein)    - Serve grilled fish over dark green salad tossed with low-fat dressing, served with grilled asparagus jaramillo     - Rotisserie chicken salad: served with sliced strawberries, walnuts, fat-free feta cheese crumbles and 1 tbsp Thomass Own Light Raspberry Acme Vinaigrette    - Shrimp cocktail: Dip cold boiled shrimp in homemade low-sugar cocktail sauce (1/2 cup Bethany One Carb ketchup, 2 tbsp horseradish, 1/4 tsp hot sauce, 1 tsp Worcestershire sauce, 1 tbsp freshly-squeezed lemon juice). Serve with dark green salad, walnuts, and crumbled blue cheese drizzled with olive oil and Balsamic vinegar    - Tuna Melt: Spread tuna salad onto 2 thick slices of tomato. Top with low-fat cheese and broil until cheese is melted. May also be made with chicken salad of shrimp salad. [place] with different types of cheeses.    - Chicken or beef fajitas (no tortilla, rice, beans, chips). Top meat and veggies w/ fresh salsa, fat free sour cream.     - Homemade low-fat Chili using extra lean ground beef or ground turkey. Top with shredded cheese and salsa as desired. May add dollop fat-free sour cream if desired    - Chicken parmesan: Top chicken breast w/ low sugar marinara sauce, mozzarella cheese and bake until chicken reaches 165*.  Serve w/ spaghetti SQUASH or Lithuanian cut green beans    - Dinner Omelet with shrimp or chicken and onion, green peppers and chives.    - No noodle lasagna: Use sliced zucchini or eggplant in place of noodles.  Layer with part skim ricotta cheese and low sugar meat sauce (use very lean ground beef or ground turkey).    - Mexican chicken bake: Bake chunks of chicken breast or thigh with taco seasoning, Pace brand enchilada sauce, green onions and low-fat  cheese. Serve with ¼ cup black beans or fat free refried beans topped with chopped tomatoes or salsa.    - Courtney frozen meatballs, simmered in Classico Marinara sauce. Different flavors of salsa or spaghetti sauce create different dishes! Sprinkle with parmesan cheese. Serve with grilled or steamed veggies, or a dark green salad.    - Simmer boneless skinless chicken thigh chunks in Classico Marinara sauce or roasted salsa until tender with chopped onion, bell pepper, garlic, mushrooms, spinach, etc.     - Hamburger or veggie burger, without the bun, dressed the way you like. Served with grilled or steamed veggies.    - Eggplant parmesan: Bake slices of eggplant at 350 degrees for 15 minutes. Layer tomato sauce, sliced eggplant and low-fat mozzarella cheese in a baking dish and cover with foil. Bake 30-40 more minutes or until bubbly. Uncover and bake at 400 degrees for about 15 more minutes, or until top is slightly crisp.    - Fish tacos: grilled/baked white fish, wrapped in Mason lettuce leaf, topped with salsa, shredded low-fat cheese, and light coleslaw.    - Chicken juana: Sprinkle chicken w/ 1 tsp of hidden valley ranch dip mix. Then grill chicken and top with black beans, salsa and 1 tsp fat free sour cream.     - Cauliflower pizza crust: Use cauliflower as crust (see recipe on deirdre, no flour!). Top w/ low fat cheese, turkey pepperoni and veggies and bake again    - chicken or turkey crust pizza: use ground chicken or turkey instead of cauliflower, spread in Ramona and bake at 350 for about 20-30 minutes(may want to add garlic, black pepper, oregano and other herbs to ground meat mixture).  Remove and top w/ low fat cheese, turkey pepperoni and veggies and bake again for another 10 minutes or until cheese is browned.     Snacks: (100-200 calories; >5g protein)    - 1 low-fat cheese stick with 8 cherry tomatoes or 1 serving fresh fruit  - 4 thin slices fat-free turkey breast and 1 slice low-fat  cheese  - 4 thin slices fat-free honey ham with wedge of melon  - 6-8 edamame pods (equivalent to about 1/4 cup edamame without pods).   - 1/4 cup unsalted nuts with ½ cup fruit  - 6-oz container Dannon Light n Fit vanilla yogurt, topped with 1oz unsalted nuts         - apple, celery or baby carrots spread with 2 Tbsp PB2  - apple slices with 1 oz slice low-fat cheese  - Apple slices dipped in 2 Tbsp of PB2  - celery, cucumber, bell pepper or baby carrots dipped in ¼ cup hummus bean spread or light spinach and artichoke dip (*recipe in lunch section)  - celery, cucumber, baby carrots dipped in high protein greek yogurt (Mix 16 oz plain greek yogurt + 1 packet of hidden valley ranch dip mix)  - Elliott Links Beef Steak - 14g protein! (similar to beef jerky)  - 2 wedges Laughing Cow - Light Herb & Garlic Cheese with sliced cucumber or green bell pepper  - 1/2 cup low-fat cottage cheese with ¼ cup fruit or ¼ cup salsa  - RTD Protein drinks: Atkins, Low Carb Slim Fast, EAS light, Muscle Milk Light, etc.  - Homemade Protein drinks: GNC Soy95, Isopure, Nectar, UNJURY, Whey Gourmet, etc. Mix 1 scoop powder with 8oz skim/1% milk or light soymilk.  - Protein bars: Atkins, EAS, Pure Protein, Think Thin, Detour, etc. Must have 0-4 grams sugar - Read the label.    Takeout Options: No more than twice/week  Deli - Salads (no pasta or rice), meats, cheeses. Roasted chicken. Lox (salmon)    Mexican - Platters which don't include tortillas, chips, or rice. Go easy on the beans. Example: Fajitas without the tortillas. Ask the  not to bring chips to the table if they are too tempting.    Greek - Meat or fish and vegetable, but no bread or rice. Including hummus, baba ganoush, etc, is OK. Most sit-down Greek restaurants can provide you with cucumber slices for dipping instead of linda bread.    Fast Food (Avoid as much as possible) - Salads (no croutons and limit salad dressing to 2 tbsp), grilled chicken sandwich without the bun  and ask for no andres. Mames low fat chili or Taco Bell pintos and cheese.    BBQ - The meats are fine if you ask for sauces on the side, but most of the traditional side dishes are loaded with carbs. Gary slaw, baked beans and BBQ sauce are typically made with sugar.    Chinese - Nothing deep-fried, no rice or noodles. Many Chinese sauces have starch and sugar in them, so you'll have to use your judgement. If you find that these sauces trigger cravings, or cause Dumping, you can ask for the sauce to be made without sugar or just use soy sauce.

## 2017-09-08 RX ORDER — ERGOCALCIFEROL 1.25 MG/1
50000 CAPSULE ORAL
Qty: 24 CAPSULE | Refills: 0 | Status: SHIPPED | OUTPATIENT
Start: 2017-09-11 | End: 2018-02-01 | Stop reason: SDUPTHER

## 2017-09-14 ENCOUNTER — DOCUMENTATION ONLY (OUTPATIENT)
Dept: BARIATRICS | Facility: CLINIC | Age: 52
End: 2017-09-14

## 2017-09-14 ENCOUNTER — OFFICE VISIT (OUTPATIENT)
Dept: PSYCHIATRY | Facility: CLINIC | Age: 52
End: 2017-09-14
Payer: COMMERCIAL

## 2017-09-14 DIAGNOSIS — M79.7 FIBROMYALGIA: Chronic | ICD-10-CM

## 2017-09-14 DIAGNOSIS — E66.01 MORBID OBESITY DUE TO EXCESS CALORIES: ICD-10-CM

## 2017-09-14 DIAGNOSIS — F32.A DEPRESSION, UNSPECIFIED DEPRESSION TYPE: ICD-10-CM

## 2017-09-14 DIAGNOSIS — Z01.818 PREOP EXAMINATION: Primary | ICD-10-CM

## 2017-09-14 PROCEDURE — 96102 PR PSYCHOLOGIC TESTING BY TECHNICIAN: CPT | Mod: 59,S$GLB,, | Performed by: PSYCHOLOGIST

## 2017-09-14 PROCEDURE — 90791 PSYCH DIAGNOSTIC EVALUATION: CPT | Mod: S$GLB,,, | Performed by: PSYCHOLOGIST

## 2017-09-14 PROCEDURE — 96101 PR PSYCHOLOGIC TESTING BY PSYCH/PHYS: CPT | Mod: 51,S$GLB,, | Performed by: PSYCHOLOGIST

## 2017-09-14 NOTE — PSYCH TESTING
[facility] [address] [place]  [address]  [phone]    REPORT OF PSYCHOLOGICAL TESTING    NAME: Cathleen Saba  OC #: [id number]  : 1965    REFERRED BY: Serge Chavez Jr., M.D.    EVALUATED BY:  Deloris Cabezas, Ph.D., Clinical Psychologist  Nickolas Boyd M.S., Psychometrician    DATES OF EVALUATION: 2017, 2017    EVALUATION PROCEDURES AND TIMES:  Conducted by Psychologist:  Interpretation and report of test data  Integration of information from diagnostic interview, medical record, and testing data  Conducted by Technician:  [place] Multiphasic Personality Inventory - 2 Restructured Form (MMPI-2RF)  [facility] ([facility])  CPT Codes and Time:  45458 - 2 hours; 01730 - 2 hours    EVALUATION FINDINGS:  Before this evaluation was initiated, the purposes and process of the assessment and the limits of confidentiality were discussed with the patient who expressed understanding of these issues and orally consented to proceed with the evaluation.    Ms. Cathleen Saba is a 52-year-old White  female who is pursuing bariatric surgery to improve her health and quality of life.  Ms. Saba has struggled with weight since she was 10 years old.  Factors that have contributed to her weight gain over the years include:  genetics, bread, and chips.  In addition, Ms. Saba acknowledges that she is an emotional eater, with stress as her primary emotional trigger to overeat.  For instance, she will return to eating sandwiches and less healthy options when stressed.  She is working on increasing her coping mechanisms for stress, including talking to her  and being involved with car shows.  She has tried many weight loss methods on her own (i.e., Nutrisystem, Conchita John, Atkins, Kiel, fad diets) with some success, and believes that her biggest weight loss challenge is current health issues.  She had previously lost 100  lbs. with diet modification and exercise; however, her fibromyalgia and other health problems have been challenges in her weight loss.  Her motivation for seeking surgery now is improving quality of life and health issues.  Her postsurgical goals include reducing pain (which occurred when she weighed less), preventing medications and health conditions, and prolonging her life.    Ms. Saba is currently prescribed Cymbalta by her PCP for depressive symptoms associated with adjustment issues.  She denied any history of suicidality or non-suicidal self-injury.  She did not report eating disorder symptoms.    At her initial consultation with Sil Waters PA-C, on 08/23/2017, her BMI was 40.85.  Her medical comorbidities include: fibromyalgia.  She completed a nutritional consultation with Nury Michaud RD, bariatric dietician, and reports that she has made many changes to her diet since beginning the program.  She has a good understanding regarding the risks and benefits of the procedure and appears motivated for change.  She was fully cooperative and engaged in the assessment process.     Ms. Saba produced an interpretable MMPI-2-RF profile. Of note, validity scale results suggest Ms. Saba tended to portray herself in an overly positive and well-adjusted presentation, which may indicate an underestimation of problems assessed by this test.  Still, this type of responding is not an uncommon style for candidates to assume given the demands of the testing situation.  This profile should be interpreted with these issues in mind.  She reports a history of head pain and likely presents with somatic complaints.  She may be prone to developing physical symptoms in response to stress.  She endorsed an item related to suicidal ideation; however, Ms. Saba noted that it must have been an error in responding because she firmly denied thoughts of death/dying or suicidal ideation.  She denied past suicidal ideation or  attempts.     The MBMD also suggested a possible problem with an overly positive presentation; however, there were no major response distortions and scoring adjustments were not needed.  The MBMD indicated that Ms. Saba is not reporting any significant psychiatric distress at this time.  She typically adheres to social conventions, which could cover up tension and sadness.  She tends to internalize feelings without analyzing them, which can lead to bodily tensions and psychophysiologic ailments.  She may have difficulty following prescribed medication or self-care regimens if distressed.  Still, she is open to information about her illness and reports strong support from family, friends, and her zane.  Overall, there is little reason to believe that psychological characteristics will lead to a complicated recovery.  Her responses suggest that, compared to other patients seeking bariatric surgery, she is likely to experience an improved quality of life after surgery, and she is likely to refrain from unhealthy eating and maintain postsurgical weight loss after surgery. A nutritional instruction plan may benefit Ms. Saba post-surgery, but psychological intervention is not necessary at this time.    DIAGNOSTIC IMPRESSIONS:    ICD-10-CM ICD-9-CM   1. Preop examination Z01.818 V72.84   2. Morbid obesity due to excess calories E66.01 278.01   3. Fibromyalgia M79.7 729.1   4. BMI 40.0-44.9, adult Z68.41 V85.41   5. Depression, unspecified depression type F32.9 311     SUMMARY AND RECOMMENDATIONS:  Ms. Saba has a long history of weight problems and is pursuing bariatric surgery at this time in an effort to improve her health and quality of life.  Test results should be considered interpretable, and indicate that she reports no current psychiatric symptoms or adjustment problems.  However, in the clinical interview she reports a history of depressive symptoms.  She reports her depression (and pain-related issues) is  well-managed with Cymbalta and Gabapentin prescribed by her PCP, and denied significant impairments at this time.  It is recommended she continue on psychotropic medications during the bariatric process.  She also acknowledged a history of emotional eating, in which she has difficulty adhering to her diet when she is stressed.  It will be important for her to demonstrate effective coping and adherence to bariatric diet and lifestyle changes throughout this process.  She reports good social support, demonstrates several characteristics that make her a good candidate for surgery, and testing suggests that she will benefit in multiple ways from bariatric surgery.  As long as she demonstrates management of depression with psychotropic medication, effective stress management and reduction in emotional eating, and adherence to bariatric diet (with nutritional clearance), it appears she will be an appropriate candidate for bariatric surgery.

## 2017-09-14 NOTE — PROGRESS NOTES
Psychiatry Initial Visit (PhD/LCSW)   Diagnostic Interview - CPT 72666     Date: 09/14/2017    Site: [facility]     Referral source: Serge Chavez Jr., M.D.    Clinical status of patient: Outpatient     Ms. Cathleen Saba, a 52-year-old White  female, presented for initial evaluation visit. Before this evaluation was initiated, the purposes and process of the assessment and the limits of confidentiality were discussed with the patient who expressed understanding of these issues and orally consented to proceed with the evaluation.     Chief complaint/reason for encounter: Routine psychological evaluation prior to bariatric surgery.     Type of surgery sought:  Sleeve    History of present illness:  Ms. Cathleen Saba is a 52-year-old White  female who is pursuing bariatric surgery to improve her health and quality of life.  She is currently prescribed Cymbalta by her PCP for depressive symptoms associated with adjustment issues.  She denied any history of suicidality or non-suicidal self-injury.  She did not report eating disorder symptoms.  She has attempted making positive lifestyle changes in anticipation for surgery, with reported benefit.  The patient has a Body Mass Index of 40.85 as documented by the referring provider.    Ms. Saba has struggled with weight since she was 10 years old.  Factors that have contributed to her weight gain over the years include:  genetics, bread, and chips.  In addition, Ms. Saba acknowledges that she is an emotional eater, with stress as her primary emotional trigger to overeat.  For instance, she will return to eating sandwiches and less healthy options when stressed.  She is working on increasing her coping mechanisms for stress, including talking to her  and being involved with car shows.  She has tried many weight loss methods on her own (i.e., Nutrisystem, Conchita John, Atkins, Kiel, fad diets) with some success, and  believes that her biggest weight loss challenge is current health issues.  She had previously lost 100 lbs. with diet modification and exercise; however, her fibromyalgia and other health problems have been challenges in her weight loss.  Her motivation for seeking surgery now is improving quality of life and health issues.  Her postsurgical goals include reducing pain (which occurred when she weighed less), preventing medications and health conditions, and prolonging her life.    Ms. Saba has met with bariatric nutritionist Nury Michaud RD, and reports that she has made the following lifestyle changes since beginning the bariatric program:  reducing bread, cutting down on fatty foods, increasing protein, eating breakfast, and drinking more water (and less tea).  She must continue meeting with Ms. Michaud to demonstrate the implementation of lifestyle changes prior to clearance for bariatric surgery.  Her  and daughter-in-law will help her during recovery after surgery.    Medical History:  Fibromyalgia    Pain:  2/10 (legs, shoulders)    Psychiatric Symptoms:  Depression:  She started noticing feeling down on myself due to pain-related problems and weight gain.  She felt bad about gaining weight and causing additional pain to herself.  She feels self-critical and self-judgmental.  She does not experience these feelings every day, but estimates she experiences them 2-3 times per week when she is in a lot of pain.  She denied episodes of depressed mood or depression-related anhedonia, lack of motivation, lethargy, difficulty concentrating, feelings of worthlessness or guilt, hopelessness, appetite changes, or psychomotor changes.  Kimberlee/Hypomania:  Denied.  She denied periods of elevated mood or abnormally increased energy or goal-directed activity.  Anxiety:  Denied.  She denied experiencing excessive, exaggerated anxiety that was unmanageable.  Thoughts:  Denied delusions,  hallucinations.  Suicidal thoughts/behaviors:  Denied.  Self-injury:  Denied.  Substance abuse:  Denied abuse or dependence.  Sleep:  She reports difficulty sleeping for the past few years.  She has difficulty falling asleep (1-2 hours) and believes it takes a while for her body to relax in preparation for sleep.  She usually awakens after sleeping only a few hours; however, the medication has helped her to sleep 7 hours of sleep.  Although her sleep has improved, she was still provided with materials on sleep hygiene and promoting efficient sleep.  She was encouraged to seek services as needed in the future.    Psychiatric History:  Previous diagnosis:  Depression  Previous hospitalizations:  Denied.  History of outpatient treatment:  She was prescribed Cymbalta and Gabapentin by her primary care physician Dr. Cami Michaels ([place]) three weeks ago.  She has found that her pain and mood have significantly improved within the past week.  She denied past therapy or medication management with a psychiatrist.  Previous suicide attempt:  Denied.    Trauma history:  Denied.    History of eating disorders:  History of bulimia:  She denied recurrent episodes of binge eating and inappropriate compensatory behaviors.    History of binge-eating episodes:  She denied eating excessive amounts of food within a discrete time period with a lack of control during eating.     Family history of psychiatric illness:  None reported.    Social history (marriage, employment, etc.):  Ms. Saba was born in [place], [place] and raised in [place] by biological mother and father along with an older sister.  She described her childhood as very good.  She denied childhood trauma and abuse.  She did pretty good - B, C student in school and earned a high school diploma.  She is currently working as an  for a .  She is not on disability and finances are not a big problem for her.  She has  been  to her  ( with school system) for 35.5 years.  She has three children (ages 34, 31, 26).  She currently lives with her , eldest son, daughter-in-law, and four grandchildren.  Her children and grandchildren are there temporarily.  She identifies as Druze.    Current psychosocial stressors:  Work, Son recently deployed    Report of coping skills:  Problem-solving, Car shows on the weekends    Support system:      Legal history: None reported.    Substance use:  Alcohol: Denied current use; denied history of abuse or dependency.   Drugs: Denied current use; denied history of abuse or dependency.  Tobacco: None.   Caffeine: She drinks one 32-oz. cup of unsweet tea each day, working on cutting back in anticipation for surgery.    Current medications and drug reactions:  see medication list.    Strengths and liabilities:  Strength: Patient accepts guidance/feedback, Strength: Patient is expressive/articulate., Strength: Patient is motivated for change., Strength: Patient has positive support network., Liability: Patient lacks coping skills.    Mental Status Exam:   General appearance:  appears stated age, neatly dressed, well groomed  Speech:  normal rate and tone  Level of cooperation:  cooperative  Thought processes:  logical, goal-directed  Mood:  mildly dysthymic  Thought content:  no illusions, no visual hallucinations, no auditory hallucinations, no delusions, no active or passive homicidal thoughts, no active or passive suicidal ideation, no obsessions, no compulsions, no violence  Affect:  appropriate  Orientation:  oriented to person, place, and date  Memory:  Recent memory:  3 of 3 objects after brief delay.    Remote memory - intact  Attention span and concentration:  spelled HOUSE forward and backwards  Fund of general knowledge: 3 of 4 recent presidents  Abstract reasoning:    Similarities: abstract.    Proverbs: abstract.  Judgment and insight: fair  Language:   intact    Diagnostic Impression:    ICD-10-CM ICD-9-CM   1. Preop examination Z01.818 V72.84   2. Morbid obesity due to excess calories E66.01 278.01   3. Fibromyalgia M79.7 729.1   4. BMI 40.0-44.9, adult Z68.41 V85.41   5. Depression, unspecified depression type F32.9 311       Summary/Conclusion:   The patient has a history of depressive symptoms associated with adjustment issues, which is being treated by Cymbalta prescribed by her PCP.  She notes significant improvements in pain and mood since starting the medication three weeks ago, and it is recommended she continue on this medication during the bariatric process.  She does have a history of eating off of her diet during stressful periods of time; however, she reports that stress management is better now with medication, increased knowledge of bariatric diet, and goals.  It does not appear that psychological intervention is necessary at this time.  The patient is aware of resources available should her needs change in the future.  The patient has reasonable expectations for surgery, good social support, and has already begun making appropriate lifestyle changes in anticipation for surgery. The patient has verbalized appropriate awareness and commitment to the necessary behavioral changes associated with bariatric surgery and appears willing to comply with long-term lifestyle changes.  She only recently started the bariatric program within the past three weeks and will need to demonstrate compliance and appropriate behaviors prior to clearance for surgery.     Recommendations:  -This patient requires nutritional clearance prior to surgery.  -It is recommended she remain on psychotropic medications for depressive symptoms and pain during the bariatric process to promote safe and effective results.  -She has a history of noncompliance with diet during stressful periods of time, and has made plans for stress management and setting goals to help her maintain her  diet.  It is recommended that these habits are monitored in future sessions to ensure effective coping and diet maintenance during stressors.    Please see Psychological Testing report available in Notes tab of Chart Review in Epic for results of psychological testing.      Length of Session:  50 minutes

## 2017-09-14 NOTE — Clinical Note
She has depression that is well-managed with medication.  She admits a history of emotional eating and going off diet plans when stressed, so I stressed to her the important of having a plan.  She thinks working with Nury, knowledge of food, and goals will help her maintain diet plan.  May be something to check in on - but if she continues doing well with diet with good stress management, I think she will be a fine candidate.  She seemed like she is doing much better now that she is on medication, so it doesn't seem like the emotional eating group is necessary.  But let me know what you think or if there's anything I can do to help!  JR

## 2017-09-28 ENCOUNTER — CLINICAL SUPPORT (OUTPATIENT)
Dept: BARIATRICS | Facility: CLINIC | Age: 52
End: 2017-09-28
Payer: COMMERCIAL

## 2017-09-28 VITALS — WEIGHT: 249.56 LBS | BODY MASS INDEX: 39.09 KG/M2

## 2017-09-28 DIAGNOSIS — Z71.3 DIETARY COUNSELING AND SURVEILLANCE: ICD-10-CM

## 2017-09-28 DIAGNOSIS — E66.9 OBESITY (BMI 30-39.9): ICD-10-CM

## 2017-09-28 DIAGNOSIS — E66.09 NON MORBID OBESITY DUE TO EXCESS CALORIES: ICD-10-CM

## 2017-09-28 PROCEDURE — 99999 PR PBB SHADOW E&M-EST. PATIENT-LVL II: CPT | Mod: PBBFAC,,, | Performed by: DIETITIAN, REGISTERED

## 2017-09-28 PROCEDURE — 99499 UNLISTED E&M SERVICE: CPT | Mod: S$GLB,,, | Performed by: DIETITIAN, REGISTERED

## 2017-09-28 PROCEDURE — 97803 MED NUTRITION INDIV SUBSEQ: CPT | Mod: S$GLB,,, | Performed by: DIETITIAN, REGISTERED

## 2017-09-28 NOTE — Clinical Note
Cleared by nutrition. Refer to CC'd chart for psych clearance. Patient is doing well with diet and lost 14 lbs over 4 weeks.

## 2017-09-28 NOTE — PATIENT INSTRUCTIONS
Reminders:  Continue current diet  -Lean protein, vegetables, fruit  -Aim for  grams protein per day  -Aim to eat 4-6 small meals per day  Begin shopping for bariatric vitamins (refer to page 18 in Nutrition Booklet)

## 2017-09-28 NOTE — PROGRESS NOTES
NUTRITION NOTE    Referring Physician: Serge Chavez M.D.  Reason for MNT Referral: Pre-op nutrition assessment pending Gastric Sleeve    PAST MEDICAL HISTORY:    Patient presents for pre-op nutrition assessment with weight loss over the past month; total weight loss by making numerous dietary and lifestyle changes. Patient doing well with diet. She is adhering to all guidelines.     Past Medical History:   Diagnosis Date    Arthritis     Fibromyalgia        CLINICAL DATA:  52 y.o. female.    There were no vitals filed for this visit.    Current Weight: 249 lbs  Weight Change Since Initial Visit: -14 lbs  Ideal Body Weight: 147 lbs  BMI: 39.09    CURRENT DIET:  Regular diet.  Diet Recall: Food records are present. (MyFitnessPal)  Brk: Premier Protein RTD  Snk: P3 packs  Vanessa: Grilled chicken with vegetables  Snk: fruit (watermelon, apple, etc)  Di: Baked fish with green beans    Diet Includes: 3 meals/day+1-2 snacks   Meal Pattern: Improved.  Protein Supplements: 1 per day.  Snacking: Adequate. Snacks include healthy choices.    Vegetables: Likes a variety. Eats daily.  Fruits: Likes a variety. Eats daily.  Beverages: water, sugar-free beverages and diet tea  Dining Out: Dining out: Reduced lately. Mostly restaurants. (ordering healthier options)  Cooking at home: Daily. Weekly. Mostly baked and grilled meat, fish and vegetables.    CURRENT EXERCISE:  Adequate  Walking 15 mins at work and 30 minutes at home    Vitamins / Minerals / Herbs:   None     Food Allergies:   No known     Social:  Works regular daytime shifts.  Alcohol: None.  Smoking: None.    ASSESSMENT:  Patient demonstrates all willingness to change lifestyle habits as evidenced by weight loss, good exercise, daily food logs, dietary changes, including protein drinks, increased fruits, increased vegetables, reduced dining out, better choices when dining out, more food preparation at y[employer]li, healthier cooking at home, bringing snacks to work, healthier  snacking at work and healthier snacking at home.    Doing well with working on greatest challenges (starchy CHO, portion control and emotional eating).    Adequate calorie intake.  Adequate protein intake.    PLAN:  Patient good candidate for bariatric surgery-sleeve.    SESSION TIME:  15 minutes

## 2017-09-29 ENCOUNTER — TELEPHONE (OUTPATIENT)
Dept: BARIATRICS | Facility: CLINIC | Age: 52
End: 2017-09-29

## 2017-09-29 NOTE — TELEPHONE ENCOUNTER
----- Message from Nury Michaud RD sent at 9/29/2017  9:46 AM CDT -----  Cleared by psych.     ----- Message -----  From: Deloris Cabezas, PhD  Sent: 9/28/2017   5:23 PM  To: Nury Michaud RD    That's excellent!!  I'm glad that she has adhered to the diet and hopefully she is continuing to manage stressors well!  I trust your opinion, and clear her from a psychological perspective.  If she does present with any issues, please let me know and I'm happy to follow-up with her.        ----- Message -----  From: Nury Michaud RD  Sent: 9/28/2017   3:17 PM  To: Deloris Cabezas, PhD    I just had my follow up with her. She did excellent with the diet and lost 14 lbs. I feel she is a good candidate for surgery. I think with her history of dieting helped her stick with the plan.     Is she cleared from psych?    Nury    ----- Message -----  From: Deolris Cabezas, PhD  Sent: 9/14/2017  12:53 PM  To: Nury Michaud RD    She has depression that is well-managed with medication.  She admits a history of emotional eating and going off diet plans when stressed, so I stressed to her the important of having a plan.  She thinks working with Nury, knowledge of food, and goals will help her maintain diet plan.  May be something to check in on - but if she continues doing well with diet with good stress management, I think she will be a fine candidate.  She seemed like she is doing much better now that she is on medication, so it doesn't seem like the emotional eating group is necessary.  But let me know what you think or if there's anything I can do to help!

## 2017-10-16 ENCOUNTER — TELEPHONE (OUTPATIENT)
Dept: BARIATRICS | Facility: CLINIC | Age: 52
End: 2017-10-16

## 2017-10-16 NOTE — TELEPHONE ENCOUNTER
Spoke with patient and scheduled preop appts/ surgery date/ post op appts. All dates and times agreed upon. Pt aware that must have PCP clearance within 30 days of surgery date signed and in chart for preop clearance. Pt aware that protein liquid diet start date is 10/31/17. Pt aware all appts can be seen in my [facility] patient portal at this time and appt reminders mailed to patient's address today by RN. Given office phone and fax number for any future questions/concerns.

## 2017-10-31 ENCOUNTER — TELEPHONE (OUTPATIENT)
Dept: BARIATRICS | Facility: CLINIC | Age: 52
End: 2017-10-31

## 2017-10-31 NOTE — TELEPHONE ENCOUNTER
----- Message from Gretchen Membreno RN sent at 10/11/2017  7:34 AM CDT -----  Regarding: liquid diet  1 week liquid diet starts 10/31/17  sleeve surgery scheduled 11/7/17  preop appt scheduled 11/1/17

## 2017-10-31 NOTE — TELEPHONE ENCOUNTER
Called patient to discuss liquid diet and vitamins.  Reminded pt of pre-op and surgery dates.  Pt to call back with any questions.  Left message.

## 2017-11-01 ENCOUNTER — DOCUMENTATION ONLY (OUTPATIENT)
Dept: BARIATRICS | Facility: CLINIC | Age: 52
End: 2017-11-01

## 2017-11-01 ENCOUNTER — OFFICE VISIT (OUTPATIENT)
Dept: BARIATRICS | Facility: CLINIC | Age: 52
End: 2017-11-01
Payer: COMMERCIAL

## 2017-11-01 VITALS
SYSTOLIC BLOOD PRESSURE: 116 MMHG | HEIGHT: 67 IN | DIASTOLIC BLOOD PRESSURE: 57 MMHG | BODY MASS INDEX: 37.61 KG/M2 | HEART RATE: 79 BPM | WEIGHT: 239.63 LBS

## 2017-11-01 DIAGNOSIS — M79.7 FIBROMYALGIA: Chronic | ICD-10-CM

## 2017-11-01 DIAGNOSIS — E66.01 MORBID OBESITY WITH BMI OF 40.0-44.9, ADULT: Primary | ICD-10-CM

## 2017-11-01 DIAGNOSIS — F32.A DEPRESSION, UNSPECIFIED DEPRESSION TYPE: ICD-10-CM

## 2017-11-01 PROCEDURE — 99999 PR PBB SHADOW E&M-EST. PATIENT-LVL III: CPT | Mod: PBBFAC,,, | Performed by: SURGERY

## 2017-11-01 PROCEDURE — 99213 OFFICE O/P EST LOW 20 MIN: CPT | Mod: S$GLB,,, | Performed by: SURGERY

## 2017-11-01 RX ORDER — MUPIROCIN 20 MG/G
OINTMENT TOPICAL
Status: CANCELLED | OUTPATIENT
Start: 2017-11-01

## 2017-11-01 RX ORDER — URSODIOL 500 MG/1
TABLET, FILM COATED ORAL
Qty: 90 TABLET | Refills: 1 | Status: SHIPPED | OUTPATIENT
Start: 2017-11-01 | End: 2019-07-17

## 2017-11-01 RX ORDER — OMEPRAZOLE 40 MG/1
40 CAPSULE, DELAYED RELEASE ORAL EVERY MORNING
Qty: 30 CAPSULE | Refills: 2 | Status: SHIPPED | OUTPATIENT
Start: 2017-11-01 | End: 2019-07-17

## 2017-11-01 RX ORDER — PROMETHAZINE HYDROCHLORIDE 25 MG/1
SUPPOSITORY RECTAL
Qty: 15 SUPPOSITORY | Refills: 0 | Status: SHIPPED | OUTPATIENT
Start: 2017-11-01 | End: 2017-11-22

## 2017-11-01 RX ORDER — ONDANSETRON 8 MG/1
8 TABLET, ORALLY DISINTEGRATING ORAL EVERY 6 HOURS PRN
Qty: 30 TABLET | Refills: 0 | Status: SHIPPED | OUTPATIENT
Start: 2017-11-01 | End: 2017-12-04

## 2017-11-01 RX ORDER — HEPARIN SODIUM 5000 [USP'U]/ML
5000 INJECTION, SOLUTION INTRAVENOUS; SUBCUTANEOUS ONCE
Status: CANCELLED | OUTPATIENT
Start: 2017-11-01 | End: 2017-11-01

## 2017-11-01 RX ORDER — HYDROCODONE BITARTRATE AND ACETAMINOPHEN 7.5; 325 MG/15ML; MG/15ML
15 SOLUTION ORAL 4 TIMES DAILY PRN
Qty: 473 ML | Refills: 0 | Status: SHIPPED | OUTPATIENT
Start: 2017-11-01 | End: 2017-11-22

## 2017-11-01 RX ORDER — DULOXETIN HYDROCHLORIDE 30 MG/1
30 CAPSULE, DELAYED RELEASE ORAL DAILY
COMMUNITY
End: 2019-07-17

## 2017-11-01 RX ORDER — FAMOTIDINE 10 MG/ML
20 INJECTION INTRAVENOUS ONCE
Status: CANCELLED | OUTPATIENT
Start: 2017-11-01 | End: 2017-11-01

## 2017-11-01 RX ORDER — POLYETHYLENE GLYCOL 3350 17 G/17G
17 POWDER, FOR SOLUTION ORAL DAILY
Qty: 1 BOTTLE | Refills: 3 | Status: SHIPPED | OUTPATIENT
Start: 2017-11-01 | End: 2019-07-17

## 2017-11-01 RX ORDER — METOCLOPRAMIDE HYDROCHLORIDE 5 MG/ML
10 INJECTION INTRAMUSCULAR; INTRAVENOUS ONCE
Status: CANCELLED | OUTPATIENT
Start: 2017-11-01 | End: 2017-11-01

## 2017-11-01 RX ORDER — SODIUM CITRATE AND CITRIC ACID MONOHYDRATE 334; 500 MG/5ML; MG/5ML
30 SOLUTION ORAL ONCE
Status: CANCELLED | OUTPATIENT
Start: 2017-11-01 | End: 2017-11-01

## 2017-11-01 NOTE — PROGRESS NOTES
Subjective:  The patient is a 52 y.o. obese female who presents for pre op for gastric sleeve surgery.  The patient has tried exercise, meditteranean diet, low starch diet and diet modification.  The most weight lost was 110 lbs with diet modification and exercise.  She has found that low fat helps her lose more weight.  She has been overweight since, a young teenager.  Her grandmother, mother, and sister are also obese.  Her highest weight was 325 lbs.  Her challenges are stress/emotional eating.  All workup has been reviewed in clinic today and there is nothing on the review that would prevent us from proceeding with surgery.  All questions were answered in clinic today prior to leaving.  Body mass index is 37.53 kg/m².       Patient Active Problem List    Diagnosis Date Noted    Morbid obesity with BMI of 40.0-44.9, adult 2017    S/P Hysteroscopic Polypectomy and D&C 08/10/2017    Abnormal uterine bleeding (AUB) 2017    Anemia 2013    Constipation 2013    Depression 2013    Fibromyalgia 2013     Past Medical History:   Diagnosis Date    Arthritis     BMI 37.0-37.9, adult     Fibromyalgia     Obesity       Past Surgical History:   Procedure Laterality Date    ADENOIDECTOMY      APPENDECTOMY       SECTION      DILATION AND CURETTAGE OF UTERUS      TONSILLECTOMY          (Not in a hospital admission)  Review of patient's allergies indicates:   Allergen Reactions    Pcn [penicillins] Other (See Comments)     Doesn't know reaction      Social History   Substance Use Topics    Smoking status: Never Smoker    Smokeless tobacco: Never Used    Alcohol use No      Family History   Problem Relation Age of Onset    Heart disease Mother     Arthritis Mother      RHEUMATOID    Heart disease Father     COPD Father     Arthritis Father      RHEUMATOID        Review of Systems  Constitutional: negative for anorexia, chills and fatigue  Eyes: negative for  "icterus, irritation and redness  Respiratory: negative for cough and dyspnea on exertion  Cardiovascular: negative for chest pain and chest pressure/discomfort  Gastrointestinal: negative for abdominal pain, change in bowel habits, constipation and diarrhea  Musculoskeletal:negative for arthralgias and back pain  Neurological: negative for coordination problems and dizziness  Behavioral/Psych: negative for anxiety and bad mood    Objective:  Vital signs in last 24 hours:  Vitals:    11/01/17 0728   BP: (!) 116/57   BP Location: Left arm   Patient Position: Sitting   BP Method: Large (Automatic)   Pulse: 79   Weight: 108.7 kg (239 lb 10.2 oz)   Height: 5' 7" (1.702 m)       General appearance: alert, appears stated age and cooperative  Head: Normocephalic, without obvious abnormality, atraumatic  Eyes: negative findings: lids and lashes normal and conjunctivae and sclerae normal  Neck: supple, symmetrical, trachea midline and thyroid not enlarged, symmetric, no tenderness/mass/nodules  Lungs: clear to auscultation bilaterally  Heart: regular rate and rhythm, S1, S2 normal, no murmur, click, rub or gallop  Abdomen: soft, non-tender; bowel sounds normal; no masses,  no organomegaly  Extremities: extremities normal, atraumatic, no cyanosis or edema  Skin: Skin color, texture, turgor normal. No rashes or lesions  Neurologic: Grossly normal    Data Review:  Psych: Cleared  Nutrition: Cleared  PCP: Cleared  CXR: WNL  EKG: WNL  Labs: No abnormalities that would prevent proceeding with surgery.    Assessment/Plan:  Morbid obesity with failure of conservative therapy.    The patient was informed that risks include, but are not limited to: death, leak, obstruction, bleeding, and sepsis. Any of these could require further surgery. Other risks include DVT, PE, pneumonia, wound dehiscence, hernia, wound infection, the need for dilatations and the inability to lose appropriate weight and keep it off.     We discussed that our goal " is to ameliorate her medical problems and not to obtain a specific body mass index. She understands the risks and benefits and wishes to proceed with the procedure. She has signed a consent form.       Serge Chavez MD

## 2017-11-01 NOTE — PROGRESS NOTES
Pre-op for Gastric sleeve:     Provided written/verbal information on pre- and post-bariatric surgery diet.  Reviewed pre-op liquid diet instructions.  Explained diet progression, stressing the importance of meeting protein & fluid needs.  Instructed on the importance of keeping a journal in order to monitor intake.  Provided and reviewed list of multivitamin/mineral supplementation to prevent deficiencies.  Provided written material and phone number to contact if any further questions.     Protein Powder/Shakes:  Premier Protein RTD and powder     Vitamins:   Flintstones Complete (Fe+)  B-Complex  B-12 SL, 5000 mcg  Calcium Citrate+Vit D     Comprehension:  Patient with good comprehension as evidenced by appropriate questions and concern expressed.

## 2017-11-06 ENCOUNTER — TELEPHONE (OUTPATIENT)
Dept: BARIATRICS | Facility: CLINIC | Age: 52
End: 2017-11-06

## 2017-11-06 ENCOUNTER — ANESTHESIA EVENT (OUTPATIENT)
Dept: SURGERY | Facility: HOSPITAL | Age: 52
DRG: 621 | End: 2017-11-06
Payer: COMMERCIAL

## 2017-11-06 NOTE — ANESTHESIA PREPROCEDURE EVALUATION
[facility]  Anesthesia Pre-Operative Evaluation         Patient Name: Cathleen Saba  YOB: 1965  MRN: [id number]    SUBJECTIVE:     Pre-operative evaluation for Procedure(s) (LRB):  GASTRECTOMY-SLEEVE-LAPAROSCOPIC with EGD-20370 (N/A)     11/06/2017    Cathleen Saba is a 52 y.o. female w/ a significant PMHx of obesity (BMI of 38), fibromyalgia, depression, and GERD.    Patient now presents for the above procedure(s).      LDA: None documented.    Prev airway: Present Prior to Hospital Arrival?: No; Placement Date: 08/10/17; Placement Time: 0701; Inserted by: CRNA; Airway Device: LMA; Mask Ventilation: Easy; Intubated: Postinduction; Airway Device Size: 4.0; Style: Cuffed; Cuff Inflation: Minimal occlusive pressure; Placement Verified By: Auscultation, Capnometry; Complicating Factors: None; Intubation Findings: Positive EtCO2, Bilateral breath sounds, Atraumatic/Condition of teeth unchanged; Securment: Lips; Complications: None; Breath Sounds: Equal Bilateral; Insertion Attempts: 1; Removal Date: 08/10/17;  Removal Time: 0745.    Drips: None documented.    Patient Active Problem List   Diagnosis    Fibromyalgia    Anemia    Constipation    Depression    Abnormal uterine bleeding (AUB)    S/P Hysteroscopic Polypectomy and D&C    Morbid obesity with BMI of 40.0-44.9, adult       Review of patient's allergies indicates:   Allergen Reactions    Pcn [penicillins] Other (See Comments)     Doesn't know reaction       No current facility-administered medications for this encounter.     Current Outpatient Prescriptions:     DULoxetine (CYMBALTA) 30 MG capsule, Take 30 mg by mouth once daily., Disp: , Rfl:     ergocalciferol (ERGOCALCIFEROL) 50,000 unit Cap, Take 1 capsule (50,000 Units total) by mouth twice a week., Disp: 24 capsule, Rfl: 0    hydrocodone-acetaminophen (HYCET) solution 7.5-325 mg/15mL, Take 15 mLs by mouth 4 (four) times daily as needed for Pain., Disp: 473 mL,  Rfl: 0    omeprazole (PRILOSEC) 40 MG capsule, Take 1 capsule (40 mg total) by mouth every morning. Open capsule and take with apple sauce, Disp: 30 capsule, Rfl: 2    ondansetron (ZOFRAN-ODT) 8 MG TbDL, Take 1 tablet (8 mg total) by mouth every 6 (six) hours as needed., Disp: 30 tablet, Rfl: 0    polyethylene glycol (GLYCOLAX) 17 gram/dose powder, Take 17 g by mouth once daily., Disp: 1 Bottle, Rfl: 3    promethazine (PHENERGAN) 25 MG suppository, 1 rectally every 6 hours prn.  Okay to change to liquid or pills., Disp: 15 suppository, Rfl: 0    ursodiol (WILDA FORTE) 500 MG tablet, Crush one tablet daily for gall bladder. Please compound into liquid and supply for 90 days if insurance approves, Disp: 90 tablet, Rfl: 1    No current facility-administered medications on file prior to encounter.      Current Outpatient Prescriptions on File Prior to Encounter   Medication Sig Dispense Refill    ergocalciferol (ERGOCALCIFEROL) 50,000 unit Cap Take 1 capsule (50,000 Units total) by mouth twice a week. 24 capsule 0       Past Surgical History:   Procedure Laterality Date    ADENOIDECTOMY      APPENDECTOMY       SECTION      DILATION AND CURETTAGE OF UTERUS      TONSILLECTOMY         Social History     Social History    Marital status:      Spouse name: N/A    Number of children: N/A    Years of education: N/A     Occupational History    [employer]ical [employer]     Social History Main Topics    Smoking status: Never Smoker    Smokeless tobacco: Never Used    Alcohol use No    Drug use: No    Sexual activity: Yes     Partners: Male     Other Topics Concern    Not on file     Social History Narrative    No narrative on file       OBJECTIVE:     Vital Signs Range (Last 24H):         Significant Labs:  Lab Results   Component Value Date    WBC 6.72 2017    HGB 13.1 2017    HCT 38.4 2017     2017    CHOL 198 2017    TRIG 55 2017    HDL 87  (H) 09/07/2017    ALT 8 (L) 09/07/2017    AST 19 09/07/2017     09/07/2017    K 3.7 09/07/2017     09/07/2017    CREATININE 0.8 09/07/2017    BUN 13 09/07/2017    CO2 28 09/07/2017    TSH 0.658 09/07/2017    HGBA1C 4.8 09/07/2017       Diagnostic Studies: No relevant studies.    EKG:  Vent. Rate : 069 BPM     Atrial Rate : 069 BPM  P-R Int : 142 ms          QRS Dur : 084 ms  QT Int : 382 ms       P-R-T Axes : 027 -13 053 degrees  QTc Int : 409 ms    Normal sinus rhythm  Normal ECG  No previous ECGs available    2D ECHO:  No results found for this or any previous visit.    ASSESSMENT/PLAN:     Anesthesia Evaluation    I have reviewed the Patient Summary Reports.     I have reviewed the Medications.     Review of Systems  Anesthesia Hx:  No problems with previous Anesthesia  Denies Family Hx of Anesthesia complications.   Denies Personal Hx of Anesthesia complications.   Social:  Non-Smoker, No Alcohol Use    Hematology/Oncology:        Denies Current/Recent Cancer   EENT/Dental:   denies chronic allergic rhinitis   Cardiovascular:   Exercise tolerance: good Denies Hypertension.  Denies MI.  Denies CAD.    Denies CABG/stent.  Denies Dysrhythmias.     Pulmonary:   Denies Pneumonia Denies Asthma.  Denies Shortness of breath.  Denies Recent URI.    Renal/:   Denies Chronic Renal Disease.     Hepatic/GI:   Denies PUD. Denies Hiatal Hernia. GERD    Neurological:   Denies TIA. Denies CVA. Denies Seizures.    Endocrine:   Denies Diabetes.    Psych:   Psychiatric History depression          Physical Exam  General:  Well nourished    Airway/Jaw/Neck:  Airway Findings: Mouth Opening: Normal Tongue: Normal  General Airway Assessment: Adult  Mallampati: III  Improves to II with phonation.  TM Distance: Normal, at least 6 cm  Jaw/Neck Findings:  Neck ROM: Normal ROM  Neck Findings: Normal     Dental:  Dental Findings: In tact   Chest/Lungs:  Chest/Lungs Findings: Clear to auscultation, Normal Respiratory Rate      Heart/Vascular:  Heart Findings: Rate: Normal  Rhythm: Regular Rhythm  Sounds: Normal     Abdomen:  Abdomen Findings:  Normal, Soft, Nontender     Musculoskeletal:  Musculoskeletal Findings: Normal   Skin:  Skin Findings: Normal    Mental Status:  Mental Status Findings:  Cooperative, Alert and Oriented         Anesthesia Plan  Type of Anesthesia, risks & benefits discussed:  Anesthesia Type:  general  Patient's Preference:   Intra-op Monitoring Plan: standard ASA monitors  Intra-op Monitoring Plan Comments:   Post Op Pain Control Plan: multimodal analgesia, IV/PO Opioids PRN and per primary service following discharge from PACU  Post Op Pain Control Plan Comments:   Induction:   IV  Beta Blocker:  Patient is not currently on a Beta-Blocker (No further documentation required).       Informed Consent: Patient understands risks and agrees with Anesthesia plan.  Questions answered. Anesthesia consent signed with patient.  ASA Score: 2     Day of Surgery Review of History & Physical:    H&P update referred to the surgeon.         Ready For Surgery From Anesthesia Perspective.

## 2017-11-06 NOTE — PRE-PROCEDURE INSTRUCTIONS
PreOp Instructions given:     - Verbal medication information (what to hold and what to take)   - NPO guidelines   - Arrival place directions given; time to be given the day before procedure by the   Surgeon's Office   - Bathing with antibacterial soap   - Don't wear any jewelry or bring any valuables AM of surgery   - No makeup or moisturizer to face   - No perfume/cologne, powder, lotions or aftershave       Pt. verbalized understanding.     Denies any family history of side effects or issues with anesthesia or sedation.

## 2017-11-06 NOTE — TELEPHONE ENCOUNTER
Notified patient of arrival time to the [facility] at 0500 with expected surgery start time 0700 on 11/7/17.   Instructed patient regarding pre-op instructions including npo status, showering and preop medications to hold/take per anesthesia/preop.  Instructed patient on the s/s of dehydration and for patient to call at the first sign of dehydration.  Informed patient that someone from bariatrics will be call them 1 week post op to review diet/fluid intake and to ensure adequate hydration.   Pt verbalized understanding. Pt given office phone number for any additional questions/concerns.

## 2017-11-07 ENCOUNTER — ANESTHESIA (OUTPATIENT)
Dept: SURGERY | Facility: HOSPITAL | Age: 52
DRG: 621 | End: 2017-11-07
Payer: COMMERCIAL

## 2017-11-07 ENCOUNTER — SURGERY (OUTPATIENT)
Age: 52
End: 2017-11-07

## 2017-11-07 ENCOUNTER — HOSPITAL ENCOUNTER (INPATIENT)
Facility: HOSPITAL | Age: 52
LOS: 1 days | Discharge: HOME | DRG: 621 | End: 2017-11-08
Attending: SURGERY | Admitting: SURGERY
Payer: COMMERCIAL

## 2017-11-07 DIAGNOSIS — E66.01 MORBID OBESITY WITH BMI OF 40.0-44.9, ADULT: Primary | ICD-10-CM

## 2017-11-07 LAB
POCT GLUCOSE: 131 MG/DL (ref 70–110)
POCT GLUCOSE: 155 MG/DL (ref 70–110)

## 2017-11-07 PROCEDURE — 88307 TISSUE EXAM BY PATHOLOGIST: CPT | Performed by: PATHOLOGY

## 2017-11-07 PROCEDURE — 27201423 OPTIME MED/SURG SUP & DEVICES STERILE SUPPLY: Performed by: SURGERY

## 2017-11-07 PROCEDURE — 27800903 OPTIME MED/SURG SUP & DEVICES OTHER IMPLANTS: Performed by: SURGERY

## 2017-11-07 PROCEDURE — 27000221 HC OXYGEN, UP TO 24 HOURS

## 2017-11-07 PROCEDURE — 63600175 PHARM REV CODE 636 W HCPCS: Performed by: SURGERY

## 2017-11-07 PROCEDURE — 25000003 PHARM REV CODE 250: Performed by: SURGERY

## 2017-11-07 PROCEDURE — 11000001 HC ACUTE MED/SURG PRIVATE ROOM

## 2017-11-07 PROCEDURE — 82962 GLUCOSE BLOOD TEST: CPT | Performed by: SURGERY

## 2017-11-07 PROCEDURE — 88342 IMHCHEM/IMCYTCHM 1ST ANTB: CPT | Mod: 26,,, | Performed by: PATHOLOGY

## 2017-11-07 PROCEDURE — 63600175 PHARM REV CODE 636 W HCPCS: Performed by: STUDENT IN AN ORGANIZED HEALTH CARE EDUCATION/TRAINING PROGRAM

## 2017-11-07 PROCEDURE — 43775 LAP SLEEVE GASTRECTOMY: CPT | Mod: ,,, | Performed by: SURGERY

## 2017-11-07 PROCEDURE — 71000033 HC RECOVERY, INTIAL HOUR: Performed by: SURGERY

## 2017-11-07 PROCEDURE — 36000711: Performed by: SURGERY

## 2017-11-07 PROCEDURE — S0028 INJECTION, FAMOTIDINE, 20 MG: HCPCS | Performed by: SURGERY

## 2017-11-07 PROCEDURE — S0077 INJECTION, CLINDAMYCIN PHOSP: HCPCS | Performed by: SURGERY

## 2017-11-07 PROCEDURE — 37000008 HC ANESTHESIA 1ST 15 MINUTES: Performed by: SURGERY

## 2017-11-07 PROCEDURE — 25000003 PHARM REV CODE 250: Performed by: STUDENT IN AN ORGANIZED HEALTH CARE EDUCATION/TRAINING PROGRAM

## 2017-11-07 PROCEDURE — 63600175 PHARM REV CODE 636 W HCPCS

## 2017-11-07 PROCEDURE — 37000009 HC ANESTHESIA EA ADD 15 MINS: Performed by: SURGERY

## 2017-11-07 PROCEDURE — 36000710: Performed by: SURGERY

## 2017-11-07 PROCEDURE — D9220A PRA ANESTHESIA: Mod: ,,, | Performed by: ANESTHESIOLOGY

## 2017-11-07 PROCEDURE — 71000039 HC RECOVERY, EACH ADD'L HOUR: Performed by: SURGERY

## 2017-11-07 DEVICE — SEAMGUARD ESCHELON 60 MM.: Type: IMPLANTABLE DEVICE | Site: ABDOMEN | Status: FUNCTIONAL

## 2017-11-07 RX ORDER — NALOXONE HCL 0.4 MG/ML
0.02 VIAL (ML) INJECTION
Status: DISCONTINUED | OUTPATIENT
Start: 2017-11-07 | End: 2017-11-08

## 2017-11-07 RX ORDER — ONDANSETRON 2 MG/ML
4 INJECTION INTRAMUSCULAR; INTRAVENOUS ONCE AS NEEDED
Status: DISCONTINUED | OUTPATIENT
Start: 2017-11-07 | End: 2017-11-07

## 2017-11-07 RX ORDER — METOCLOPRAMIDE HYDROCHLORIDE 5 MG/ML
10 INJECTION INTRAMUSCULAR; INTRAVENOUS ONCE
Status: COMPLETED | OUTPATIENT
Start: 2017-11-07 | End: 2017-11-07

## 2017-11-07 RX ORDER — LIDOCAINE HCL/PF 100 MG/5ML
SYRINGE (ML) INTRAVENOUS
Status: DISCONTINUED | OUTPATIENT
Start: 2017-11-07 | End: 2017-11-07

## 2017-11-07 RX ORDER — ENOXAPARIN SODIUM 100 MG/ML
40 INJECTION SUBCUTANEOUS
Status: DISCONTINUED | OUTPATIENT
Start: 2017-11-07 | End: 2017-11-08 | Stop reason: HOSPADM

## 2017-11-07 RX ORDER — PROPOFOL 10 MG/ML
VIAL (ML) INTRAVENOUS
Status: DISCONTINUED | OUTPATIENT
Start: 2017-11-07 | End: 2017-11-07

## 2017-11-07 RX ORDER — CLINDAMYCIN PHOSPHATE 900 MG/50ML
900 INJECTION, SOLUTION INTRAVENOUS
Status: COMPLETED | OUTPATIENT
Start: 2017-11-07 | End: 2017-11-07

## 2017-11-07 RX ORDER — HEPARIN SODIUM 5000 [USP'U]/ML
5000 INJECTION, SOLUTION INTRAVENOUS; SUBCUTANEOUS ONCE
Status: COMPLETED | OUTPATIENT
Start: 2017-11-07 | End: 2017-11-07

## 2017-11-07 RX ORDER — HYDROMORPHONE HYDROCHLORIDE 1 MG/ML
0.2 INJECTION, SOLUTION INTRAMUSCULAR; INTRAVENOUS; SUBCUTANEOUS EVERY 5 MIN PRN
Status: DISCONTINUED | OUTPATIENT
Start: 2017-11-07 | End: 2017-11-07 | Stop reason: HOSPADM

## 2017-11-07 RX ORDER — HYDROMORPHONE HYDROCHLORIDE 1 MG/ML
0.2 INJECTION, SOLUTION INTRAMUSCULAR; INTRAVENOUS; SUBCUTANEOUS EVERY 5 MIN PRN
Status: DISCONTINUED | OUTPATIENT
Start: 2017-11-07 | End: 2017-11-07

## 2017-11-07 RX ORDER — PANTOPRAZOLE SODIUM 40 MG/10ML
40 INJECTION, POWDER, LYOPHILIZED, FOR SOLUTION INTRAVENOUS 2 TIMES DAILY
Status: DISCONTINUED | OUTPATIENT
Start: 2017-11-07 | End: 2017-11-07

## 2017-11-07 RX ORDER — HYDROMORPHONE HCL IN 0.9% NACL 6 MG/30 ML
PATIENT CONTROLLED ANALGESIA SYRINGE INTRAVENOUS CONTINUOUS
Status: DISCONTINUED | OUTPATIENT
Start: 2017-11-07 | End: 2017-11-08

## 2017-11-07 RX ORDER — ONDANSETRON 2 MG/ML
INJECTION INTRAMUSCULAR; INTRAVENOUS
Status: DISCONTINUED | OUTPATIENT
Start: 2017-11-07 | End: 2017-11-07

## 2017-11-07 RX ORDER — HYDROCODONE BITARTRATE AND ACETAMINOPHEN 7.5; 325 MG/15ML; MG/15ML
15 SOLUTION ORAL EVERY 4 HOURS PRN
Status: DISCONTINUED | OUTPATIENT
Start: 2017-11-08 | End: 2017-11-08 | Stop reason: HOSPADM

## 2017-11-07 RX ORDER — FAMOTIDINE 10 MG/ML
20 INJECTION INTRAVENOUS 2 TIMES DAILY
Status: DISCONTINUED | OUTPATIENT
Start: 2017-11-07 | End: 2017-11-08 | Stop reason: HOSPADM

## 2017-11-07 RX ORDER — HYDROMORPHONE HCL IN 0.9% NACL 6 MG/30 ML
PATIENT CONTROLLED ANALGESIA SYRINGE INTRAVENOUS
Status: COMPLETED
Start: 2017-11-07 | End: 2017-11-07

## 2017-11-07 RX ORDER — GLUCAGON 1 MG
1 KIT INJECTION
Status: DISCONTINUED | OUTPATIENT
Start: 2017-11-07 | End: 2017-11-08 | Stop reason: HOSPADM

## 2017-11-07 RX ORDER — SODIUM CHLORIDE 0.9 % (FLUSH) 0.9 %
3 SYRINGE (ML) INJECTION
Status: DISCONTINUED | OUTPATIENT
Start: 2017-11-07 | End: 2017-11-07 | Stop reason: HOSPADM

## 2017-11-07 RX ORDER — SODIUM CHLORIDE 9 MG/ML
INJECTION, SOLUTION INTRAVENOUS CONTINUOUS
Status: DISCONTINUED | OUTPATIENT
Start: 2017-11-07 | End: 2017-11-08 | Stop reason: HOSPADM

## 2017-11-07 RX ORDER — HYDRALAZINE HYDROCHLORIDE 20 MG/ML
INJECTION INTRAMUSCULAR; INTRAVENOUS
Status: DISPENSED
Start: 2017-11-07 | End: 2017-11-07

## 2017-11-07 RX ORDER — DIPHENHYDRAMINE HYDROCHLORIDE 50 MG/ML
25 INJECTION INTRAMUSCULAR; INTRAVENOUS EVERY 6 HOURS PRN
Status: DISCONTINUED | OUTPATIENT
Start: 2017-11-07 | End: 2017-11-07 | Stop reason: HOSPADM

## 2017-11-07 RX ORDER — FAMOTIDINE 10 MG/ML
20 INJECTION INTRAVENOUS ONCE
Status: COMPLETED | OUTPATIENT
Start: 2017-11-07 | End: 2017-11-07

## 2017-11-07 RX ORDER — FENTANYL CITRATE 50 UG/ML
INJECTION, SOLUTION INTRAMUSCULAR; INTRAVENOUS
Status: DISCONTINUED | OUTPATIENT
Start: 2017-11-07 | End: 2017-11-07

## 2017-11-07 RX ORDER — INSULIN ASPART 100 [IU]/ML
0-5 INJECTION, SOLUTION INTRAVENOUS; SUBCUTANEOUS EVERY 6 HOURS PRN
Status: DISCONTINUED | OUTPATIENT
Start: 2017-11-07 | End: 2017-11-08 | Stop reason: HOSPADM

## 2017-11-07 RX ORDER — SODIUM CITRATE AND CITRIC ACID MONOHYDRATE 334; 500 MG/5ML; MG/5ML
30 SOLUTION ORAL ONCE
Status: COMPLETED | OUTPATIENT
Start: 2017-11-07 | End: 2017-11-07

## 2017-11-07 RX ORDER — MUPIROCIN 20 MG/G
OINTMENT TOPICAL
Status: DISCONTINUED | OUTPATIENT
Start: 2017-11-07 | End: 2017-11-07

## 2017-11-07 RX ORDER — NEOSTIGMINE METHYLSULFATE 1 MG/ML
INJECTION, SOLUTION INTRAVENOUS
Status: DISCONTINUED | OUTPATIENT
Start: 2017-11-07 | End: 2017-11-07

## 2017-11-07 RX ORDER — MIDAZOLAM HYDROCHLORIDE 1 MG/ML
INJECTION, SOLUTION INTRAMUSCULAR; INTRAVENOUS
Status: DISCONTINUED | OUTPATIENT
Start: 2017-11-07 | End: 2017-11-07

## 2017-11-07 RX ORDER — ACETAMINOPHEN 10 MG/ML
INJECTION, SOLUTION INTRAVENOUS
Status: DISCONTINUED | OUTPATIENT
Start: 2017-11-07 | End: 2017-11-07

## 2017-11-07 RX ORDER — KETAMINE HYDROCHLORIDE 100 MG/ML
INJECTION, SOLUTION INTRAMUSCULAR; INTRAVENOUS
Status: DISCONTINUED | OUTPATIENT
Start: 2017-11-07 | End: 2017-11-07

## 2017-11-07 RX ORDER — ACETAMINOPHEN 10 MG/ML
1000 INJECTION, SOLUTION INTRAVENOUS EVERY 8 HOURS
Status: COMPLETED | OUTPATIENT
Start: 2017-11-07 | End: 2017-11-08

## 2017-11-07 RX ORDER — SODIUM CHLORIDE 9 MG/ML
INJECTION, SOLUTION INTRAVENOUS CONTINUOUS PRN
Status: DISCONTINUED | OUTPATIENT
Start: 2017-11-07 | End: 2017-11-07

## 2017-11-07 RX ORDER — GLYCOPYRROLATE 0.2 MG/ML
INJECTION INTRAMUSCULAR; INTRAVENOUS
Status: DISCONTINUED | OUTPATIENT
Start: 2017-11-07 | End: 2017-11-07

## 2017-11-07 RX ORDER — OXYCODONE HYDROCHLORIDE 5 MG/1
5 TABLET ORAL
Status: DISCONTINUED | OUTPATIENT
Start: 2017-11-07 | End: 2017-11-07 | Stop reason: HOSPADM

## 2017-11-07 RX ORDER — HYDRALAZINE HYDROCHLORIDE 20 MG/ML
10 INJECTION INTRAMUSCULAR; INTRAVENOUS ONCE
Status: COMPLETED | OUTPATIENT
Start: 2017-11-07 | End: 2017-11-07

## 2017-11-07 RX ORDER — ROCURONIUM BROMIDE 10 MG/ML
INJECTION, SOLUTION INTRAVENOUS
Status: DISCONTINUED | OUTPATIENT
Start: 2017-11-07 | End: 2017-11-07

## 2017-11-07 RX ORDER — DEXAMETHASONE SODIUM PHOSPHATE 4 MG/ML
INJECTION, SOLUTION INTRA-ARTICULAR; INTRALESIONAL; INTRAMUSCULAR; INTRAVENOUS; SOFT TISSUE
Status: DISCONTINUED | OUTPATIENT
Start: 2017-11-07 | End: 2017-11-07

## 2017-11-07 RX ORDER — ONDANSETRON 2 MG/ML
4 INJECTION INTRAMUSCULAR; INTRAVENOUS EVERY 6 HOURS PRN
Status: DISCONTINUED | OUTPATIENT
Start: 2017-11-07 | End: 2017-11-08

## 2017-11-07 RX ORDER — BUPIVACAINE HYDROCHLORIDE AND EPINEPHRINE 5; 5 MG/ML; UG/ML
INJECTION, SOLUTION EPIDURAL; INTRACAUDAL; PERINEURAL
Status: DISCONTINUED | OUTPATIENT
Start: 2017-11-07 | End: 2017-11-07 | Stop reason: HOSPADM

## 2017-11-07 RX ORDER — LIDOCAINE HYDROCHLORIDE 10 MG/ML
INJECTION, SOLUTION EPIDURAL; INFILTRATION; INTRACAUDAL; PERINEURAL
Status: DISCONTINUED | OUTPATIENT
Start: 2017-11-07 | End: 2017-11-07 | Stop reason: HOSPADM

## 2017-11-07 RX ORDER — ONDANSETRON 2 MG/ML
4 INJECTION INTRAMUSCULAR; INTRAVENOUS DAILY PRN
Status: DISCONTINUED | OUTPATIENT
Start: 2017-11-07 | End: 2017-11-07 | Stop reason: HOSPADM

## 2017-11-07 RX ADMIN — ACETAMINOPHEN 1000 MG: 10 INJECTION, SOLUTION INTRAVENOUS at 07:11

## 2017-11-07 RX ADMIN — ROCURONIUM BROMIDE 50 MG: 10 INJECTION, SOLUTION INTRAVENOUS at 07:11

## 2017-11-07 RX ADMIN — ENOXAPARIN SODIUM 40 MG: 100 INJECTION SUBCUTANEOUS at 05:11

## 2017-11-07 RX ADMIN — ONDANSETRON 4 MG: 2 INJECTION INTRAMUSCULAR; INTRAVENOUS at 08:11

## 2017-11-07 RX ADMIN — Medication: at 08:11

## 2017-11-07 RX ADMIN — PROMETHAZINE HYDROCHLORIDE 6.25 MG: 25 INJECTION INTRAMUSCULAR; INTRAVENOUS at 08:11

## 2017-11-07 RX ADMIN — FAMOTIDINE 20 MG: 10 INJECTION INTRAVENOUS at 05:11

## 2017-11-07 RX ADMIN — SODIUM CITRATE AND CITRIC ACID MONOHYDRATE 30 ML: 500; 334 SOLUTION ORAL at 05:11

## 2017-11-07 RX ADMIN — LIDOCAINE HYDROCHLORIDE 60 MG: 20 INJECTION, SOLUTION INTRAVENOUS at 07:11

## 2017-11-07 RX ADMIN — CLINDAMYCIN PHOSPHATE 900 MG: 18 INJECTION, SOLUTION INTRAVENOUS at 07:11

## 2017-11-07 RX ADMIN — METOCLOPRAMIDE 10 MG: 5 INJECTION, SOLUTION INTRAMUSCULAR; INTRAVENOUS at 05:11

## 2017-11-07 RX ADMIN — SODIUM CHLORIDE: 0.9 INJECTION, SOLUTION INTRAVENOUS at 06:11

## 2017-11-07 RX ADMIN — SODIUM CHLORIDE, SODIUM GLUCONATE, SODIUM ACETATE, POTASSIUM CHLORIDE, MAGNESIUM CHLORIDE, SODIUM PHOSPHATE, DIBASIC, AND POTASSIUM PHOSPHATE: .53; .5; .37; .037; .03; .012; .00082 INJECTION, SOLUTION INTRAVENOUS at 07:11

## 2017-11-07 RX ADMIN — GLYCOPYRROLATE 0.6 MG: 0.2 INJECTION, SOLUTION INTRAMUSCULAR; INTRAVENOUS at 08:11

## 2017-11-07 RX ADMIN — KETAMINE HYDROCHLORIDE 20 MG: 100 INJECTION, SOLUTION, CONCENTRATE INTRAMUSCULAR; INTRAVENOUS at 07:11

## 2017-11-07 RX ADMIN — HYDRALAZINE HYDROCHLORIDE 10 MG: 20 INJECTION INTRAMUSCULAR; INTRAVENOUS at 10:11

## 2017-11-07 RX ADMIN — ONDANSETRON 4 MG: 2 INJECTION, SOLUTION INTRAMUSCULAR; INTRAVENOUS at 05:11

## 2017-11-07 RX ADMIN — MUPIROCIN: 20 OINTMENT TOPICAL at 05:11

## 2017-11-07 RX ADMIN — MIDAZOLAM HYDROCHLORIDE 2 MG: 1 INJECTION, SOLUTION INTRAMUSCULAR; INTRAVENOUS at 06:11

## 2017-11-07 RX ADMIN — BUPIVACAINE HYDROCHLORIDE AND EPINEPHRINE 20 ML: 5; 5 INJECTION, SOLUTION EPIDURAL; INTRACAUDAL; PERINEURAL at 07:11

## 2017-11-07 RX ADMIN — NEOSTIGMINE METHYLSULFATE 5 MG: 1 INJECTION INTRAVENOUS at 08:11

## 2017-11-07 RX ADMIN — FENTANYL CITRATE 100 MCG: 50 INJECTION, SOLUTION INTRAMUSCULAR; INTRAVENOUS at 07:11

## 2017-11-07 RX ADMIN — LIDOCAINE HYDROCHLORIDE 20 ML: 10 INJECTION, SOLUTION EPIDURAL; INFILTRATION; INTRACAUDAL; PERINEURAL at 07:11

## 2017-11-07 RX ADMIN — FAMOTIDINE 20 MG: 10 INJECTION, SOLUTION INTRAVENOUS at 08:11

## 2017-11-07 RX ADMIN — HEPARIN SODIUM 5000 UNITS: 5000 INJECTION, SOLUTION INTRAVENOUS; SUBCUTANEOUS at 05:11

## 2017-11-07 RX ADMIN — DEXAMETHASONE SODIUM PHOSPHATE 4 MG: 4 INJECTION, SOLUTION INTRAMUSCULAR; INTRAVENOUS at 07:11

## 2017-11-07 RX ADMIN — ACETAMINOPHEN 1000 MG: 10 INJECTION, SOLUTION INTRAVENOUS at 04:11

## 2017-11-07 RX ADMIN — SODIUM CHLORIDE: 0.9 INJECTION, SOLUTION INTRAVENOUS at 08:11

## 2017-11-07 RX ADMIN — PROPOFOL 130 MG: 10 INJECTION, EMULSION INTRAVENOUS at 07:11

## 2017-11-07 RX ADMIN — Medication: at 01:11

## 2017-11-07 NOTE — BRIEF OP NOTE
[facility]  Brief Operative Note    SUMMARY     Surgery Date: 11/7/2017     Surgeon(s) and Role:     * Lee Martin Jr., MD - Resident - Assisting     * Serge Chavez Jr., MD - Primary    Pre-op Diagnosis:  Fibromyalgia [M79.7]  BMI 39.0-39.9,adult [Z68.39]  Depression, unspecified depression type [F32.9]  Obesity, unspecified classification, unspecified obesity type, unspecified whether serious comorbidity present [E66.9]    Post-op Diagnosis:  Post-Op Diagnosis Codes:     * Fibromyalgia [M79.7]     * BMI 39.0-39.9,adult [Z68.39]     * Depression, unspecified depression type [F32.9]     * Obesity, unspecified classification, unspecified obesity type, unspecified whether serious comorbidity present [E66.9]    Procedure(s) (LRB):  GASTRECTOMY-SLEEVE-LAPAROSCOPIC with EGD-30326 (N/A)  REPAIR-HERNIA-HIATAL-LAPAROSCOPIC (N/A)    Anesthesia: General    Description of Procedure: laparoscopic hiatal hernia repair and sleeve gastrectomy with intraoperative EGD    Description of the findings of the procedure: moderate sized hiatal hernia; successful sleeve gastrectomy with unremarkable EGD    Estimated Blood Loss: 15 cc         Specimens:   Specimen (12h ago through future)    Start     Ordered    11/07/17 0811  Specimen to Pathology - Surgery  Once     Comments:  1) Stomach--- PERM      11/07/17 0813

## 2017-11-07 NOTE — ANESTHESIA POSTPROCEDURE EVALUATION
"Anesthesia Post Evaluation    Patient: Cathleen Saba    Procedure(s) Performed: Procedure(s) (LRB):  GASTRECTOMY-SLEEVE-LAPAROSCOPIC with EGD-96768 (N/A)  REPAIR-HERNIA-HIATAL-LAPAROSCOPIC (N/A)    Final Anesthesia Type: general  Patient location during evaluation: PACU  Patient participation: Yes- Able to Participate  Level of consciousness: awake and alert  Post-procedure vital signs: reviewed and stable  Pain management: adequate  Airway patency: patent  PONV status at discharge: No PONV  Anesthetic complications: no      Cardiovascular status: blood pressure returned to baseline  Respiratory status: unassisted  Hydration status: euvolemic  Follow-up not needed.        Visit Vitals  BP (!) 173/81   Pulse 68   Temp 36.4 °C (97.6 °F) (Oral)   Resp 16   Ht 5' 7" (1.702 m)   Wt 109.2 kg (240 lb 11.9 oz)   LMP 09/26/2013   SpO2 100%   BMI 37.71 kg/m²       Pain/Cristal Score: Pain Assessment Performed: Yes (11/7/2017  8:33 AM)  Presence of Pain: denies (11/7/2017  8:33 AM)  Pain Rating Prior to Med Admin: 0 (11/7/2017  8:44 AM)  Cristal Score: 7 (11/7/2017  8:33 AM)      "

## 2017-11-07 NOTE — INTERVAL H&P NOTE
The patient has been examined and the H&P has been reviewed:    I concur with the findings and no changes have occurred since H&P was written.    Anesthesia/Surgery risks, benefits and alternative options discussed and understood by patient/family.          Active Hospital Problems    Diagnosis  POA    Morbid obesity with BMI of 40.0-44.9, adult [E66.01, Z68.41]  Not Applicable      Resolved Hospital Problems    Diagnosis Date Resolved POA   No resolved problems to display.

## 2017-11-07 NOTE — TRANSFER OF CARE
"Anesthesia Transfer of Care Note    Patient: Cathleen Saba    Procedure(s) Performed: Procedure(s) (LRB):  GASTRECTOMY-SLEEVE-LAPAROSCOPIC with EGD-88566 (N/A)  REPAIR-HERNIA-HIATAL-LAPAROSCOPIC (N/A)    Patient location: PACU    Anesthesia Type: general    Transport from OR: Transported from OR on 6-10 L/min O2 by face mask with adequate spontaneous ventilation    Post pain: adequate analgesia    Post assessment: no apparent anesthetic complications    Post vital signs: stable    Level of consciousness: awake    Nausea/Vomiting: no nausea/vomiting    Complications: none    Transfer of care protocol was followed      Last vitals:   Visit Vitals  BP (!) 173/81   Pulse 68   Temp 36.4 °C (97.6 °F) (Oral)   Resp 16   Ht 5' 7" (1.702 m)   Wt 109.2 kg (240 lb 11.9 oz)   LMP 09/26/2013   SpO2 100%   BMI 37.71 kg/m²     "

## 2017-11-07 NOTE — NURSING TRANSFER
Nursing Transfer Note      11/7/2017     Transfer To: 553a    Transfer via stretcher    Transfer with     Transported by PCT    Medicines sent: NS, Pca dilaudid    Chart send with patient: Yes    Notified: spouse    Patient reassessed at: 11/7/17 1620  Upon arrival to floor:

## 2017-11-07 NOTE — ANESTHESIA RELEASE NOTE
"Anesthesia Release from PACU Note    Patient: Cathleen Saba    Procedure(s) Performed: Procedure(s) (LRB):  GASTRECTOMY-SLEEVE-LAPAROSCOPIC with EGD-29471 (N/A)  REPAIR-HERNIA-HIATAL-LAPAROSCOPIC (N/A)    Anesthesia type: general    Post pain: Adequate analgesia    Post assessment: no apparent anesthetic complications    Last Vitals:   Visit Vitals  /68   Pulse 63   Temp 36.4 °C (97.5 °F) (Temporal)   Resp 15   Ht 5' 7" (1.702 m)   Wt 109.2 kg (240 lb 11.9 oz)   LMP 09/26/2013   SpO2 99%   BMI 37.71 kg/m²       Post vital signs: stable    Level of consciousness: awake, alert  and oriented    Nausea/Vomiting: no nausea/no vomiting    Complications: none    Airway Patency: patent    Respiratory: unassisted, room air    Cardiovascular: stable and blood pressure at baseline    Hydration: euvolemic  "

## 2017-11-08 VITALS
BODY MASS INDEX: 37.75 KG/M2 | TEMPERATURE: 98 F | RESPIRATION RATE: 17 BRPM | HEIGHT: 67 IN | WEIGHT: 240.5 LBS | HEART RATE: 64 BPM | DIASTOLIC BLOOD PRESSURE: 75 MMHG | OXYGEN SATURATION: 98 % | SYSTOLIC BLOOD PRESSURE: 156 MMHG

## 2017-11-08 LAB
ANION GAP SERPL CALC-SCNC: 9 MMOL/L
BASOPHILS # BLD AUTO: 0.02 K/UL
BASOPHILS NFR BLD: 0.2 %
BUN SERPL-MCNC: 5 MG/DL
CALCIUM SERPL-MCNC: 8.8 MG/DL
CHLORIDE SERPL-SCNC: 99 MMOL/L
CO2 SERPL-SCNC: 24 MMOL/L
CREAT SERPL-MCNC: 0.6 MG/DL
DIFFERENTIAL METHOD: ABNORMAL
EOSINOPHIL # BLD AUTO: 0 K/UL
EOSINOPHIL NFR BLD: 0 %
ERYTHROCYTE [DISTWIDTH] IN BLOOD BY AUTOMATED COUNT: 13.3 %
EST. GFR  (AFRICAN AMERICAN): >60 ML/MIN/1.73 M^2
EST. GFR  (NON AFRICAN AMERICAN): >60 ML/MIN/1.73 M^2
GLUCOSE SERPL-MCNC: 90 MG/DL
HCT VFR BLD AUTO: 37.1 %
HGB BLD-MCNC: 12.5 G/DL
IMM GRANULOCYTES # BLD AUTO: 0.02 K/UL
IMM GRANULOCYTES NFR BLD AUTO: 0.2 %
LYMPHOCYTES # BLD AUTO: 1.2 K/UL
LYMPHOCYTES NFR BLD: 11.3 %
MAGNESIUM SERPL-MCNC: 1.6 MG/DL
MCH RBC QN AUTO: 27.3 PG
MCHC RBC AUTO-ENTMCNC: 33.7 G/DL
MCV RBC AUTO: 81 FL
MONOCYTES # BLD AUTO: 0.8 K/UL
MONOCYTES NFR BLD: 7.5 %
NEUTROPHILS # BLD AUTO: 8.3 K/UL
NEUTROPHILS NFR BLD: 80.8 %
NRBC BLD-RTO: 0 /100 WBC
PHOSPHATE SERPL-MCNC: 2.2 MG/DL
PLATELET # BLD AUTO: 218 K/UL
PMV BLD AUTO: 13 FL
POCT GLUCOSE: 68 MG/DL (ref 70–110)
POCT GLUCOSE: 70 MG/DL (ref 70–110)
POCT GLUCOSE: 86 MG/DL (ref 70–110)
POCT GLUCOSE: 89 MG/DL (ref 70–110)
POTASSIUM SERPL-SCNC: 4 MMOL/L
RBC # BLD AUTO: 4.58 M/UL
SODIUM SERPL-SCNC: 132 MMOL/L
WBC # BLD AUTO: 10.28 K/UL

## 2017-11-08 PROCEDURE — 83735 ASSAY OF MAGNESIUM: CPT

## 2017-11-08 PROCEDURE — 25000003 PHARM REV CODE 250: Performed by: SURGERY

## 2017-11-08 PROCEDURE — 0BQT4ZZ REPAIR DIAPHRAGM, PERCUTANEOUS ENDOSCOPIC APPROACH: ICD-10-PCS | Performed by: SURGERY

## 2017-11-08 PROCEDURE — 63600175 PHARM REV CODE 636 W HCPCS: Performed by: SURGERY

## 2017-11-08 PROCEDURE — 85025 COMPLETE CBC W/AUTO DIFF WBC: CPT

## 2017-11-08 PROCEDURE — 36415 COLL VENOUS BLD VENIPUNCTURE: CPT

## 2017-11-08 PROCEDURE — 0DJ08ZZ INSPECTION OF UPPER INTESTINAL TRACT, VIA NATURAL OR ARTIFICIAL OPENING ENDOSCOPIC: ICD-10-PCS | Performed by: SURGERY

## 2017-11-08 PROCEDURE — 0DB64Z3 EXCISION OF STOMACH, PERCUTANEOUS ENDOSCOPIC APPROACH, VERTICAL: ICD-10-PCS | Performed by: SURGERY

## 2017-11-08 PROCEDURE — 94770 HC EXHALED C02 TEST: CPT

## 2017-11-08 PROCEDURE — 97161 PT EVAL LOW COMPLEX 20 MIN: CPT

## 2017-11-08 PROCEDURE — 99900035 HC TECH TIME PER 15 MIN (STAT)

## 2017-11-08 PROCEDURE — 84100 ASSAY OF PHOSPHORUS: CPT

## 2017-11-08 PROCEDURE — 94761 N-INVAS EAR/PLS OXIMETRY MLT: CPT

## 2017-11-08 PROCEDURE — S0028 INJECTION, FAMOTIDINE, 20 MG: HCPCS | Performed by: SURGERY

## 2017-11-08 PROCEDURE — 97165 OT EVAL LOW COMPLEX 30 MIN: CPT

## 2017-11-08 PROCEDURE — 80048 BASIC METABOLIC PNL TOTAL CA: CPT

## 2017-11-08 RX ORDER — PROMETHAZINE HYDROCHLORIDE 25 MG/1
25 SUPPOSITORY RECTAL EVERY 6 HOURS PRN
Status: DISCONTINUED | OUTPATIENT
Start: 2017-11-08 | End: 2017-11-08 | Stop reason: HOSPADM

## 2017-11-08 RX ORDER — ONDANSETRON 8 MG/1
8 TABLET, ORALLY DISINTEGRATING ORAL EVERY 8 HOURS PRN
Status: DISCONTINUED | OUTPATIENT
Start: 2017-11-08 | End: 2017-11-08 | Stop reason: HOSPADM

## 2017-11-08 RX ADMIN — ACETAMINOPHEN 1000 MG: 10 INJECTION, SOLUTION INTRAVENOUS at 12:11

## 2017-11-08 RX ADMIN — FAMOTIDINE 20 MG: 10 INJECTION, SOLUTION INTRAVENOUS at 08:11

## 2017-11-08 RX ADMIN — DEXTROSE MONOHYDRATE 12.5 G: 25 INJECTION, SOLUTION INTRAVENOUS at 08:11

## 2017-11-08 RX ADMIN — ONDANSETRON 4 MG: 2 INJECTION, SOLUTION INTRAMUSCULAR; INTRAVENOUS at 12:11

## 2017-11-08 RX ADMIN — ONDANSETRON 4 MG: 2 INJECTION, SOLUTION INTRAMUSCULAR; INTRAVENOUS at 08:11

## 2017-11-08 RX ADMIN — PROMETHAZINE HYDROCHLORIDE 6.25 MG: 25 INJECTION INTRAMUSCULAR; INTRAVENOUS at 04:11

## 2017-11-08 RX ADMIN — ACETAMINOPHEN 1000 MG: 10 INJECTION, SOLUTION INTRAVENOUS at 08:11

## 2017-11-08 NOTE — PLAN OF CARE
Problem: Patient Care Overview  Goal: Plan of Care Review  Outcome: Ongoing (interventions implemented as appropriate)  Patient AAOx3. VSS. No falls/injuries as pt calls for assistance. Surgical dressing clean, dry, and intact. IVF infusing per orders. Pain being monitored and controlled with PCA dilaudid pump. Patient nauseated throughout night; PRN antiemetics given. MD on call notified. SCD's on. Bed in lowest position, wheels locked, call light in reach. Will follow plan of care.

## 2017-11-08 NOTE — NURSING
Discharge papers given. VSS. No complaints of nausea. Pt able to tolerate jello and broth. Water protocol complete. Ambulates in degroot with stand by assist. Pt verbalizes understanding diet when returning home and to crush all meds.

## 2017-11-08 NOTE — PLAN OF CARE
Cami Chaudhry MD    [facility] - [place], [place] - [address] [place] [address]  Phone: [phone] Fax: [phone]    Future Appointments  Date Time Provider Department [place]   11/22/2017 12:20 PM LAB, APPOINTMENT [place] [facility]   11/22/2017 1:00 PM Liv Fonseca PA-C [facility]   11/22/2017 1:30 PM Simon Mcneal RD [facility]   12/4/2017 8:00 AM HOLLEY Shaffer [facility]   12/4/2017 8:30 AM Nury Michaud RD [facility]   1/31/2018 7:40 AM LAB, APPOINTMENT [place] [facility]   1/31/2018 8:20 AM Liv Fonseca PA-C [facility]   1/31/2018 8:45 AM Nury Michaud RD [facility]        11/08/17 0943   Discharge Assessment   Assessment Type Discharge Planning Assessment   Confirmed/corrected address and phone number on facesheet? Yes   Assessment information obtained from? Medical Record   Expected Length of Stay (days) 2   Communicated expected length of stay with patient/caregiver no   Prior to hospitilization cognitive status: Alert/Oriented   Prior to hospitalization functional status: Independent   Current cognitive status: Alert/Oriented   Current Functional Status: Independent   Lives With spouse;child(fausto), adult   Able to Return to Prior Arrangements yes   Is patient able to care for self after discharge? Yes   Who are your caregiver(s) and their phone number(s)? Abdulaziz Saba  spouse  [phone]   Patient's perception of discharge disposition home or selfcare   Readmission Within The Last 30 Days no previous admission in last 30 days   Patient currently being followed by outpatient case management? No   Patient currently receives any other outside agency services? No   Equipment Currently Used at Home none   Do you have any problems affording any of your prescribed medications? No   Is the patient taking medications  as prescribed? yes   Does the patient have transportation home? Yes   Transportation Available family or friend will provide   Does the patient receive services at the [facility]mad[facility] Clinic? No   Discharge Plan A Home   Discharge Plan B Home with family   Patient/Family In Agreement With Plan yes

## 2017-11-08 NOTE — SUBJECTIVE & OBJECTIVE
Interval History: No acute events overnight. Pt seen and examined at the bedside. Vital signs stable. Some minimal nausea controlled with antiemetics, no emesis. Otherwise feeling OK. Had not started water protocol yet this am.     Medications:  Continuous Infusions:   sodium chloride 0.9% 125 mL/hr at 11/07/17 0846    hydromorphone in 0.9 % NaCl 6 mg/30 ml       Scheduled Meds:   acetaminophen  1,000 mg Intravenous Q8H    enoxparin  40 mg Subcutaneous Q24H    famotidine (PF)  20 mg Intravenous BID     PRN Meds:dextrose 50%, glucagon (human recombinant), hydrocodone-apap 7.5-325 MG/15 ML, insulin aspart, naloxone, ondansetron, promethazine (PHENERGAN) IVPB     Review of patient's allergies indicates:   Allergen Reactions    Pcn [penicillins] Other (See Comments)     Doesn't know reaction     Objective:     Vital Signs (Most Recent):  Temp: 98.5 °F (36.9 °C) (11/08/17 0414)  Pulse: 68 (11/08/17 0603)  Resp: 18 (11/08/17 0603)  BP: (!) 153/74 (11/08/17 0500)  SpO2: 99 % (11/08/17 0603) Vital Signs (24h Range):  Temp:  [97.4 °F (36.3 °C)-98.5 °F (36.9 °C)] 98.5 °F (36.9 °C)  Pulse:  [53-79] 68  Resp:  [10-20] 18  SpO2:  [94 %-100 %] 99 %  BP: (131-193)/(65-81) 153/74     Weight: 109.1 kg (240 lb 8 oz)  Body mass index is 37.67 kg/m².    Intake/Output - Last 3 Shifts       11/06 0700 - 11/07 0659 11/07 0700 - 11/08 0659 11/08 0700 - 11/09 0659    P.O.  0     I.V. (mL/kg)  2525 (23.1)     Total Intake(mL/kg)  2525 (23.1)     Urine (mL/kg/hr)  300 (0.1)     Emesis/NG output  0 (0)     Total Output   300      Net   +2225             Urine Occurrence  2 x     Stool Occurrence  0 x     Emesis Occurrence  0 x           Physical Exam   Constitutional: She is oriented to person, place, and time. She appears well-developed and well-nourished. No distress.   HENT:   Head: Normocephalic and atraumatic.   Eyes: Pupils are equal, round, and reactive to light. No scleral icterus.   Neck: No tracheal deviation present.    Cardiovascular: Normal rate.    Pulmonary/Chest: Effort normal. No respiratory distress.   Abdominal: Soft. There is tenderness (appropriately tender).   Neurological: She is alert and oriented to person, place, and time. No cranial nerve deficit.   Skin: Skin is warm. She is not diaphoretic. No erythema.   Psychiatric: She has a normal mood and affect. Her behavior is normal.       Significant Labs:  CBC:   Recent Labs  Lab 11/08/17 0412   WBC 10.28   RBC 4.58   HGB 12.5   HCT 37.1      MCV 81*   MCH 27.3   MCHC 33.7     CMP:   Recent Labs  Lab 11/08/17 0412   GLU 90   CALCIUM 8.8   *   K 4.0   CO2 24   CL 99   BUN 5*   CREATININE 0.6       Significant Diagnostics:  none

## 2017-11-08 NOTE — HOSPITAL COURSE
Patient underwent sleeve gastrectomy on 11/7/17.  She tolerated the procedure well with no acute post-operative complications.  She was observed overnight until she could ambulate independently, tolerate the approved clear liquid diet, and have well controlled pain and nausea.  She was discharged home on post-operative day #1.

## 2017-11-08 NOTE — OP NOTE
DATE OF PROCEDURE: 11/07/2017  SERVICE: Bariatric Surgery.   Surgeon(s) and Role:     * Lee Martin Jr., MD - Resident - Assisting     * Serge Chavez Jr., MD - Primary  PREOPERATIVE DIAGNOSES: Morbid obesity with a Body mass index is 37.67 kg/m².   along with depression .   POSTOPERATIVE DIAGNOSES:Same and Hiatal Hernia  PROCEDURE: Laparoscopic sleeve gastrectomy, Hiatal Hernia Repair and EGD.  ANESTHESIA: General endotracheal and local.   DESCRIPTION OF PROCEDURE: The patient was taken to the Operating Room, placed under general anesthesia, prepped and draped in sterile fashion. At this time,   incision was made approximately 15 cm from xiphoid and 5 cm to the left of   midline after infiltrating with local anesthetic. Using Optiview trocar,   intraabdominal access was obtained under direct visualization without difficulty   and pneumoperitoneum was obtained. Further ports were then placed including   bilateral anterior axillary subcostal 5 mm ports and midclavicular subcostal 5   mm port on the right and a second 12 port approximately 15 cm from xiphoid just   to right of midline. These were all placed after infiltrating with local   anesthetic and under direct visualization. Once the ports were placed, the   patient was placed in steep reverse Trendelenburg. A liver retractor was   placed. The hiatus was then examined. A moderate sized hiatal hernia was noted.  At this time the gastrohepatic ligament was opened to the right grey with the harmonic scalpel and was continued anteriorly taking down the phrenoesophageal ligament.  The defect was then closed with an Endostitch device and 2-0 Surgidac suture in interrupted fashion with 2 sutures. Once this was   complete, we turned attention to the sleeve itself. An area on the greater   curve approximately 6 cm proximal from the pylorus was identified and using a   Harmonic scalpel, the gastrocolic ligament was opened, exposing the lesser sac.   We  continued to take down attachments along the greater curve including the   short gastrics to the angle of His, freeing the lateral part of the stomach. A   42-Finnish bougie was then guided by Anesthesia into the stomach and from the   laparoscopic view guided along the lesser curve. Once the bougie was in   position along the lesser curve, we began the sleeve with a green load stapler   with SeamGuard at the area 6 cm proximal from the pylorus using the bougie as a   guide on the lesser curve and fired the green load stapler beginning the sleeve.   Further staple loads, blue with SeamGuard were fired along the bougie on the   lesser curve towards the angle of His, completely freeing the lateral part of   the stomach. Once this was complete, the stomach was removed from the incision   approximately 15 cm from xiphoid just to right of midline after it was slightly   incising the skin and stretching the fascia with a Kathya. The specimen was   removed and passed off the table with ease. At this time, an 0 Vicryl suture   was then used to close the fascial defect on a suture passer device under direct   visualization. The suture was placed in figure-of-8 fashion, however, it was   not tied down at this time, the patient was laid flat. The bougie was removed.   Attention then turned to an EGD. The scope was placed in the oropharynx,   guided down the esophagus and into the sleeve through the sleeve and into the   antrum with ease. At this time, the area was infiltrated with air and we slowly   pulled the scope back inspecting the sleeve itself. The staple line showed no   signs of bleeding or other abnormalities. The sleeve itself was in good   condition with no abnormalities, no twisting or obstruction and nice uniform   size. After inspection, we suctioned all the air from the antrum and sleeve and   removed the scope under direct visualization, turned our attention back to the   laparoscopic view. We inspected the  staple line, no signs of bleeding or other   abnormalities from the staple line. The liver retractor was removed. The ports   were removed under direct visualization. The suture was then placed in the   fascia of the incision 15 cm from xiphoid just to right of midline, was tied   down closing the fascia of this incision and the incision was irrigated   copiously. At this time, the skin of all five port sites was closed with 4-0   Monocryl suture in subcuticular fashion. Mastisol and Steri-Strips were placed.   The patient was allowed to awake from general anesthesia, transferred to bed   for transfer to Recovery.   COMPLICATIONS: None.   SPONGE COUNT: Correct.   BLOOD LOSS: 15 mL.   FLUIDS: Per Anesthesia.   BLOOD GIVEN: None.   DRAINS: None.   SPECIMENS: Stomach.   CONDITION OF THE PATIENT: Good.   I was present for the entire procedure.

## 2017-11-08 NOTE — PT/OT/SLP EVAL
"Physical Therapy  Evaluation/Discharge Summary    Cathleen Saba   MRN: [id number]   Admitting Diagnosis: Morbid Obesity    PT Received On: 17  PT Start Time: 858     PT Stop Time: 915    PT Total Time (min): 17 min       Billable Minutes:  Evaluation 15    Diagnosis: Morbid Obesity; s/p gastrectomy with sleeve laparoscopy; s/p EGD with hernia repair    Personal factors/comorbidities: Due to the below listed comorbidities the patient cannot fully participate in PT POC.  Body systems elements affected: back, trunk, musculoskeletal system, cardiovascular, pulmonary system  Clinical Presentation: stable  Functional Outcome Tools: AMPAC, ROM, MMT, orientation   Evaluation Complexity Level: Low    Past Medical History:   Diagnosis Date    Arthritis     BMI 37.0-37.9, adult     Fibromyalgia     Obesity       Past Surgical History:   Procedure Laterality Date    ADENOIDECTOMY      APPENDECTOMY       SECTION      DILATION AND CURETTAGE OF UTERUS      TONSILLECTOMY       Referring physician: Mario  Date referred to PT:     General Precautions: Standard,      Do you have any cultural, spiritual, Methodist conflicts, given your current situation?: None noted    Patient History:  Lives With: child(fausto), adult, spouse, grandchild(fausto)  Living Environment Comment: Pt lives in a [facility] with x4 SARY with spouse, daughter in law and grandchild. PTA pt was (I) with mobility and ADLs; driving, not working. Pt reports sedentary life-style. Pt reports no falls in the home.  Equipment Currently Used at Home: none  DME owned (not currently used): none    Previous Level of Function:  Ambulation Skills: independent  Transfer Skills: independent  ADL Skills: independent  Work/Leisure Activity: independent    Subjective:  Communicated with RN (Ariane) prior to session.    Chief Complaint: "I don't really sleep much."  Patient goals: "I would love to go to the bathroom."    Pain/Comfort  Pain Rating 1: " 0/10  Pain Rating Post-Intervention 1: 0/10    Objective:   Patient found with: PCA, peripheral IV     Cognitive Exam:  Oriented to: Person, Place, Time and Situation    Follows Commands/attention: Follows multistep  commands  Communication: clear/fluent  Safety awareness/insight to disability: intact    Physical Exam:  Postural examination/scapula alignment: Rounded shoulder    Skin integrity: Visible skin intact  Edema: None noted    Sensation:   Intact    Upper Extremity Range of Motion:  Right Upper Extremity: WFL  Left Upper Extremity: WFL    Upper Extremity Strength:  Right Upper Extremity: WNL  Left Upper Extremity: WNL    Lower Extremity Range of Motion:  Right Lower Extremity: WFL  Left Lower Extremity: WFL    Lower Extremity Strength:  Right Lower Extremity: WNL  Left Lower Extremity: WNL     Fine motor coordination:  Intact    Gross motor coordination: WFL    Functional Mobility:  Bed Mobility: Independent    Transfers: Independent    Gait:   Gait Distance: Pt ambulated to bathroom with SBA for IV pole mnmgnt; no LOB. Pt ambulated x>200 feet in hallway setting with supervision. No gait deviations noted and no SOB/dizziness.  Assistance 1: Supervision  Gait Assistive Device: No device  Gait Pattern: reciprocal  Gait Deviation(s):  (none)    Balance:   Static Sit: GOOD: Takes MODERATE challenges from all directions  Dynamic Sit: GOOD: Maintains balance through MODERATE excursions of active trunk movement  Static Stand: GOOD-: Takes MODERATE challenges from all directions inconsistently  Dynamic stand: GOOD-: Needs SUPERVISION only during gait and able to self right with moderate     Therapeutic Activities and Exercises:  PT arrived to pt's room to find pt resting quietly; agreeable to PT session. Pt performed mobility as above. Upon return to room, pt agreeable to sit EOB for breakfast. No reports of nausea. Pt aware of D/C from PT. Questions/concerns addressed within PT scope of practice; pt and pt's  spouse with no further questions.    AM-PAC 6 CLICK MOBILITY  How much help from another person does this patient currently need?   1 = Unable, Total/Dependent Assistance  2 = A lot, Maximum/Moderate Assistance  3 = A little, Minimum/Contact Guard/Supervision  4 = None, Modified [place]/Independent    Turning over in bed (including adjusting bedclothes, sheets and blankets)?: 4  Sitting down on and standing up from a chair with arms (e.g., wheelchair, bedside commode, etc.): 4  Moving from lying on back to sitting on the side of the bed?: 4  Moving to and from a bed to a chair (including a wheelchair)?: 4  Need to walk in hospital room?: 4  Climbing 3-5 steps with a railing?: 4  Total Score: 24     AM-PAC Raw Score CMS G-Code Modifier Level of Impairment Assistance   6 % Total / Unable   7 - 9 CM 80 - 100% Maximal Assist   10 - 14 CL 60 - 80% Moderate Assist   15 - 19 CK 40 - 60% Moderate Assist   20 - 22 CJ 20 - 40% Minimal Assist   23 CI 1-20% SBA / CGA   24 CH 0% Independent/ Mod I     Patient left sitting EOB with all lines intact, call button in reach, RN notified and spouse present.    Assessment:   Cathleen Saba is a 52 y.o. female with a medical diagnosis of morbid obesity s/p gatrectomy with sleep laparoscopy. Pt presents with baseline mobility; limited by nausea. No acute PT needs. D/C Acute PT services 11/8/2017.    Rehab identified problem list/impairments: Rehab identified problem list/impairments:  (none)    Rehab potential is Good.    Activity tolerance: Good    Discharge recommendations: Discharge Facility/Level Of Care Needs: home     Barriers to discharge: Barriers to Discharge: None    Equipment recommendations: Equipment Needed After Discharge: none     PLAN:    D/C Acute PT services 11/8/2017. Home.   Plan of Care reviewed with: patient, spouse    Radha Smith, PT, DPT  [phone]  11/8/2017

## 2017-11-08 NOTE — PLAN OF CARE
Problem: Patient Care Overview  Goal: Plan of Care Review  Outcome: Ongoing (interventions implemented as appropriate)  Patient AAOX3, VSS. Family at bedside. Two side rails up, bed locked, call light within reach. No falls noted as these precautions remain. Patient is free of skin breakdown as patient moves well independentlyBlood glucose monitoring ordered. Pain controlled well with PCA pump. Nausea controlled. Hourly rounds made and no complaints at this time noted. Will resume with plan of care.

## 2017-11-08 NOTE — ASSESSMENT & PLAN NOTE
-Progress per pathway  -Bariatric clear liquid diet protocol  -CRUSH ALL MEDICATIONS  -Transition PCA to Hycet for pain  -Transition antiemetics to PO/MI formulations  -Restart appropriate home meds  -Ambulate  -Incentive spirometry  -DVT and GI ppx  -Possible discharge home today

## 2017-11-08 NOTE — DISCHARGE SUMMARY
[facility]  General Surgery  Discharge Summary      Patient Name: Cathleen Saba  MRN: [id number]  Admission Date: 11/7/2017  Hospital Length of Stay: 1 days  Discharge Date and Time:  11/08/2017 1:45 PM  Attending Physician: Serge Chavez Jr.,*   Discharging Provider: Lee Wilkins Jr., MD  Primary Care Provider: Cami Chaudhry MD    HPI:   No notes on file    Procedure(s) (LRB):  GASTRECTOMY-SLEEVE-LAPAROSCOPIC with [id number] (N/A)  REPAIR-HERNIA-HIATAL-LAPAROSCOPIC (N/A)      Indwelling Lines/Drains at time of discharge:   Lines/Drains/Airways          No matching active lines, drains, or airways        Hospital Course: Patient underwent sleeve gastrectomy on 11/7/17.  She tolerated the procedure well with no acute post-operative complications.  She was observed overnight until she could ambulate independently, tolerate the approved clear liquid diet, and have well controlled pain and nausea.  She was discharged home on post-operative day #1.    Consults:     Significant Diagnostic Studies: Labs:   BMP:   Recent Labs  Lab 11/08/17  0412   GLU 90   *   K 4.0   CL 99   CO2 24   BUN 5*   CREATININE 0.6   CALCIUM 8.8   MG 1.6    and CBC   Recent Labs  Lab 11/08/17  0412   WBC 10.28   HGB 12.5   HCT 37.1          Pending Diagnostic Studies:     None        Final Active Diagnoses:    Diagnosis Date Noted POA    PRINCIPAL PROBLEM:  Morbid obesity with BMI of 40.0-44.9, adult [E66.01, Z68.41] 08/23/2017 Not Applicable      Problems Resolved During this Admission:    Diagnosis Date Noted Date Resolved POA      Discharged Condition: good    Disposition: Home or Self Care    Follow Up:  Follow-up Information     Serge Chavez Jr, MD. Schedule an appointment as soon as possible for a visit in 2 weeks.    Specialties:  General Surgery, Bariatrics  Why:  post op  Contact information:  [address]  [place] [address]  [phone]                 Patient  Instructions:     Diet general   Order Comments: Clear liquid diet with approved protein drinks as discussed in clinic prior to your surgery.     Shower on day dressing removed (No bath)   Order Comments: Keep incisions clean and dry for 48 hours.  After 48 hours you may shower over the incisions; however, do not submerge incisions under water (for example pool, bath, lake, etc) for a total of 2 weeks after surgery.     Lifting restrictions   Order Comments: Do not lift anything heavier than 10 pounds for 6 weeks.     Other restrictions (specify):   Order Comments: Crush all medications or take them in a powder or liquid form.  Do not drive while taking medication for pain.     Call MD for:  redness, tenderness, or signs of infection (pain, swelling, redness, odor or green/yellow discharge around incision site)     Call MD for:  severe uncontrolled pain     Call MD for:  persistent nausea and vomiting or diarrhea     Call MD for:  temperature >100.4     Remove dressing in 48 hours   Order Comments: OK to remove outer bandages in 48 hours prior to showering.  There are small strips of white tape directly over the incisions.  Allow these to fall off on their own.  This process could take up to 2 weeks.       Medications:  Reconciled Home Medications:   Current Discharge Medication List      CONTINUE these medications which have NOT CHANGED    Details   DULoxetine (CYMBALTA) 30 MG capsule Take 30 mg by mouth once daily.      ergocalciferol (ERGOCALCIFEROL) 50,000 unit Cap Take 1 capsule (50,000 Units total) by mouth twice a week.  Qty: 24 capsule, Refills: 0      hydrocodone-acetaminophen (HYCET) solution 7.5-325 mg/15mL Take 15 mLs by mouth 4 (four) times daily as needed for Pain.  Qty: 473 mL, Refills: 0    Associated Diagnoses: Morbid obesity with BMI of 40.0-44.9, adult      omeprazole (PRILOSEC) 40 MG capsule Take 1 capsule (40 mg total) by mouth every morning. Open capsule and take with apple sauce  Qty: 30  capsule, Refills: 2    Associated Diagnoses: Morbid obesity with BMI of 40.0-44.9, adult      ondansetron (ZOFRAN-ODT) 8 MG TbDL Take 1 tablet (8 mg total) by mouth every 6 (six) hours as needed.  Qty: 30 tablet, Refills: 0    Associated Diagnoses: Morbid obesity with BMI of 40.0-44.9, adult      polyethylene glycol (GLYCOLAX) 17 gram/dose powder Take 17 g by mouth once daily.  Qty: 1 Bottle, Refills: 3    Associated Diagnoses: Morbid obesity with BMI of 40.0-44.9, adult      promethazine (PHENERGAN) 25 MG suppository 1 rectally every 6 hours prn.  Okay to change to liquid or pills.  Qty: 15 suppository, Refills: 0    Associated Diagnoses: Morbid obesity with BMI of 40.0-44.9, adult      ursodiol (WILDA FORTE) 500 MG tablet Crush one tablet daily for gall bladder. Please compound into liquid and supply for 90 days if insurance approves  Qty: 90 tablet, Refills: 1    Associated Diagnoses: Morbid obesity with BMI of 40.0-44.9, adult           Time spent on the discharge of patient: 15 minutes    Lee Wilkins Jr., MD  General Surgery  [facility]

## 2017-11-08 NOTE — PT/OT/SLP EVAL
"Occupational Therapy  Evaluation & Discharge    Cathleen Saba   MRN: [id number]   Admitting Diagnosis: <principal problem not specified>    OT Date of Treatment: 17   OT Start Time: 1050  OT Stop Time: 1102  OT Total Time (min): 12 min    Billable Minutes:  Evaluation 12    Diagnosis: <principal problem not specified>       Past Medical History:   Diagnosis Date    Arthritis     BMI 37.0-37.9, adult     Fibromyalgia     Obesity       Past Surgical History:   Procedure Laterality Date    ADENOIDECTOMY      APPENDECTOMY       SECTION      DILATION AND CURETTAGE OF UTERUS      TONSILLECTOMY         Referring physician: Mario  Date referred to OT: 17    General Precautions: Standard, fall  Orthopedic Precautions: N/A  Braces: N/A          Patient History:  Living Environment  Lives With: child(fausto), adult, grandchild(fausto), spouse  Living Arrangements: house  Home Accessibility: stairs to enter home  Number of Stairs to Enter Home: 2  Transportation Available: family or friend will provide  Living Environment Comment:  (PT lives in [facility] , DIL and grandchildren - steps to enter - PLOF was I with ADLs and mobility without AD - has  assist)  Equipment Currently Used at Home: none    Prior level of function:   Bed Mobility/Transfers: independent  Grooming: independent  Bathing: independent  Upper Body Dressing: independent  Lower Body Dressing: independent  Toileting: independent  Home Management Skills: independent  Homemaking Responsibilities: Yes  Driving License: Yes  Mode of Transportation: Car  Leisure and Hobbies: travel with      Dominant hand: right    Subjective:  Communicated with nurse prior to session.  "I think I'm going home today."  Chief Complaint: fatigued  Patient/Family stated goals: return home    Pain/Comfort  Pain Rating 1: 0/10  Pain Rating Post-Intervention 1: 0/10    Objective:  Patient found with: PCA, peripheral IV    Cognitive Exam:  Oriented to: " Person, Place, Time and Situation  Follows Commands/attention: Follows multistep  commands  Communication: clear/fluent  Memory:  No Deficits noted  Safety awareness/insight to disability: intact  Coping skills/emotional control: Appropriate to situation    Visual/perceptual:  Intact    Physical Exam:  Postural examination/scapula alignment: No postural abnormalities identified  Skin integrity: Visible skin intact  Edema: None noted     Sensation:   Intact    Upper Extremity Range of Motion:  Right Upper Extremity: WFL  Left Upper Extremity: WFL    Upper Extremity Strength:  Right Upper Extremity: WFL  Left Upper Extremity: WFL   Strength: good    Fine motor coordination:   Intact    Gross motor coordination: WFL    Functional Mobility:  Bed Mobility:  Rolling/Turning Right: Independent  Scooting/Bridging: Independent  Supine to Sit: Independent    Transfers:  Sit <> Stand Assistance: Independent  Sit <> Stand Assistive Device: No Assistive Device  Bed <> Chair Technique: Stand Pivot  Bed <> Chair Transfer Assistance: Independent  Bed <> Chair Assistive Device: No Assistive Device  Toilet Transfer Technique: Stand Pivot  Toilet Transfer Assistance: Independent  Toilet Transfer Assistive Device: No Assistive Device    Functional Ambulation: pt able to walk from bed <-> bathroom without AD or assistance - no LOB noted    Activities of Daily Living:  Feeding Level of Assistance: Independent    UE Dressing Level of Assistance: Independent (hospital gown as robe)    LE Dressing Level of Assistance: Independent (donned underwear and pants)    Grooming Position: Standing at sink  Grooming Level of Assistance: Independent     Toileting Where Assessed: Toilet  Toileting Level of Assistance: Independent        Balance:   Static Sit: GOOD+: Takes MAXIMAL challenges from all directions.    Dynamic Sit: GOOD+: Maintains balance through MAXIMAL excursions of active trunk motion  Static Stand: GOOD+: Takes MAXIMAL challenges  "from all directions  Dynamic stand: GOOD+: Independent gait (with or without assistive device)    Therapeutic Activities and Exercises:  · Pt completed ADLs and func mobility activities for tx session as noted above  · Whiteboard updated  · Pt educated on role of OT and POC      AM-PAC 6 CLICK ADL  How much help from another person does this patient currently need?  1 = Unable, Total/Dependent Assistance  2 = A lot, Maximum/Moderate Assistance  3 = A little, Minimum/Contact Guard/Supervision  4 = None, Modified [place]/Independent    Putting on and taking off regular lower body clothing? : 4  Bathing (including washing, rinsing, drying)?: 4  Toileting, which includes using toilet, bedpan, or urinal? : 4  Putting on and taking off regular upper body clothing?: 4  Taking care of personal grooming such as brushing teeth?: 4  Eating meals?: 4  Total Score: 24    AM-PAC Raw Score CMS "G-Code Modifier Level of Impairment Assistance   6 % Total / Unable   7 - 9 CM 80 - 100% Maximal Assist   10-14 CL 60 - 80% Moderate Assist   15 - 19 CK 40 - 60% Moderate Assist   20 - 22 CJ 20 - 40% Minimal Assist   23 CI 1-20% SBA / CGA   24 CH 0% Independent/ Mod I       Patient left up in chair with all lines intact, call button in reach and  present    Assessment:  Cathleen Saba is a 52 y.o. female with a medical diagnosis of <principal problem not specified> and presents with deficits noted below.  Pt's PLOF was I with ADLs and func mobility.  Pt completed OT evaluation and noted to perform func mobility and ADLs with mod I to I.  Pt will have assistance/supervision as needed once discharged home.  Due to these factors, pt is discharged from OT services.  No DME or post acute OT required at this time.     Pt evaluation falls under low complexity for evaluation coding due to performance deficits noted in 1-3 areas as stated above and 0 co-morbities affecting current functional status. Data obtained from problem " focused assessments. No modifications or assistance was required for completion of evaluation. Only brief occupational profile and history review completed.       Rehab identified problem list/impairments: Rehab identified problem list/impairments: impaired endurance    Rehab potential is good.    Activity tolerance: Good    Discharge recommendations: Discharge Facility/Level Of Care Needs: home     Barriers to discharge: Barriers to Discharge: None    Equipment recommendations: none     GOALS:    Occupational Therapy Goals     Not on file          Multidisciplinary Problems (Resolved)        Problem: Occupational Therapy Goal    Goal Priority Disciplines Outcome Interventions   Occupational Therapy Goal   (Resolved)     OT, PT/OT Outcome(s) achieved                    PLAN:  Patient to be seen   to address the above listed problems via    Plan of Care expires:    Plan of Care reviewed with: patient, spouse         Danielle Burrell OT  11/08/2017

## 2017-11-08 NOTE — PLAN OF CARE
Problem: Occupational Therapy Goal  Goal: Occupational Therapy Goal  Outcome: Outcome(s) achieved Date Met: 11/08/17    Pt's PLOF was I with ADLs and func mobility.  Pt completed OT evaluation and noted to perform func mobility and ADLs with mod I to I.  Pt will have assistance/supervision as needed once discharged home.  Due to these factors, pt is discharged from OT services.  No DME or post acute OT required at this time.     Danielle Burrell, OT  11/8/2017

## 2017-11-08 NOTE — PROGRESS NOTES
[facility]  General Surgery  Progress Note    Subjective:     History of Present Illness:  No notes on file    Post-Op Info:  Procedure(s) (LRB):  GASTRECTOMY-SLEEVE-LAPAROSCOPIC with [id number] (N/A)  REPAIR-HERNIA-HIATAL-LAPAROSCOPIC (N/A)   1 Day Post-Op     Interval History: No acute events overnight. Pt seen and examined at the bedside. Vital signs stable. Some minimal nausea controlled with antiemetics, no emesis. Otherwise feeling OK. Had not started water protocol yet this am.     Medications:  Continuous Infusions:   sodium chloride 0.9% 125 mL/hr at 11/07/17 0846    hydromorphone in 0.9 % NaCl 6 mg/30 ml       Scheduled Meds:   acetaminophen  1,000 mg Intravenous Q8H    enoxparin  40 mg Subcutaneous Q24H    famotidine (PF)  20 mg Intravenous BID     PRN Meds:dextrose 50%, glucagon (human recombinant), hydrocodone-apap 7.5-325 MG/15 ML, insulin aspart, naloxone, ondansetron, promethazine (PHENERGAN) IVPB     Review of patient's allergies indicates:   Allergen Reactions    Pcn [penicillins] Other (See Comments)     Doesn't know reaction     Objective:     Vital Signs (Most Recent):  Temp: 98.5 °F (36.9 °C) (11/08/17 0414)  Pulse: 68 (11/08/17 0603)  Resp: 18 (11/08/17 0603)  BP: (!) 153/74 (11/08/17 0500)  SpO2: 99 % (11/08/17 0603) Vital Signs (24h Range):  Temp:  [97.4 °F (36.3 °C)-98.5 °F (36.9 °C)] 98.5 °F (36.9 °C)  Pulse:  [53-79] 68  Resp:  [10-20] 18  SpO2:  [94 %-100 %] 99 %  BP: (131-193)/(65-81) 153/74     Weight: 109.1 kg (240 lb 8 oz)  Body mass index is 37.67 kg/m².    Intake/Output - Last 3 Shifts       11/06 0700 - 11/07 0659 11/07 0700 - 11/08 0659 11/08 0700 - 11/09 0659    P.O.  0     I.V. (mL/kg)  2525 (23.1)     Total Intake(mL/kg)  2525 (23.1)     Urine (mL/kg/hr)  300 (0.1)     Emesis/NG output  0 (0)     Total Output   300      Net   +2225             Urine Occurrence  2 x     Stool Occurrence  0 x     Emesis Occurrence  0 x           Physical Exam    Constitutional: She is oriented to person, place, and time. She appears well-developed and well-nourished. No distress.   HENT:   Head: Normocephalic and atraumatic.   Eyes: Pupils are equal, round, and reactive to light. No scleral icterus.   Neck: No tracheal deviation present.   Cardiovascular: Normal rate.    Pulmonary/Chest: Effort normal. No respiratory distress.   Abdominal: Soft. There is tenderness (appropriately tender).   Neurological: She is alert and oriented to person, place, and time. No cranial nerve deficit.   Skin: Skin is warm. She is not diaphoretic. No erythema.   Psychiatric: She has a normal mood and affect. Her behavior is normal.       Significant Labs:  CBC:   Recent Labs  Lab 11/08/17  0412   WBC 10.28   RBC 4.58   HGB 12.5   HCT 37.1      MCV 81*   MCH 27.3   MCHC 33.7     CMP:   Recent Labs  Lab 11/08/17  0412   GLU 90   CALCIUM 8.8   *   K 4.0   CO2 24   CL 99   BUN 5*   CREATININE 0.6       Significant Diagnostics:  none    Assessment/Plan:     Morbid obesity with BMI of 40.0-44.9, adult    -Progress per pathway  -Bariatric clear liquid diet protocol  -CRUSH ALL MEDICATIONS  -Transition PCA to Hycet for pain  -Transition antiemetics to PO/IN formulations  -Restart appropriate home meds  -Ambulate  -Incentive spirometry  -DVT and GI ppx  -Possible discharge home today            Neftaly Richard MD  General Surgery  [facility]

## 2017-11-08 NOTE — PLAN OF CARE
Problem: Physical Therapy Goal  Goal: Physical Therapy Goal  Outcome: Outcome(s) achieved Date Met: 11/08/17    PT Evaluation complete. Recommending Home with no needs upon D/C. D/C Acute PT services 11/8/2017. Please see full note for details.    Radha Smith, PT, DPT  [phone]  11/8/2017

## 2017-11-14 ENCOUNTER — TELEPHONE (OUTPATIENT)
Dept: BARIATRICS | Facility: CLINIC | Age: 52
End: 2017-11-14

## 2017-11-14 NOTE — TELEPHONE ENCOUNTER
Per Simon, patient has not had BM since surgery. Called patient.  She stated that she has not had BM since before surgery but that her norm is BM once weekly prior to surgery.   Patient is asymptomatic, - denies abdominal pain, nausea or feelings of constipation.  Discussed with SERENITY Fonseca, patient to do fleets if no BM within 10 days of surgery or sooner if symptoms develop.  Patient verbalized understanding

## 2017-11-14 NOTE — TELEPHONE ENCOUNTER
----- Message from Simon Mcneal RD sent at 11/14/2017 10:44 AM CST -----  Regarding: Water Protocol  No water protocol in hospital post-op. :(

## 2017-11-14 NOTE — TELEPHONE ENCOUNTER
Chart review and pt didn't get customary early am water protocol r/t nausea. She did receive bariatric tray later in am and tolerated broth and jello per doc and RN notes stating protocol completed and she was discharged home.

## 2017-11-14 NOTE — TELEPHONE ENCOUNTER
Called to check in 1 week post op from bariatric surgery.    Water protocol began at 7 am and completed while in hospital/ medicine cups given to you by nursing to take home (y/n): n    Dehydration assessment:  Urine output/color: pretty good, a little dark. 4-5 times a day.   Chest pain: none  Persistent increased heart rate: none  Fatigue: none  N/V: none  Dizzy/weak: none   BM: no BM. Miralax daily. No abdominal pain.     Protein and fluid intake assessment: (food diary)  Fluid intake: drinking 4-5 bottles of water/crystal light  Protein supplements: whey protein + lactaid milk   Protein intake yesterday: 50 grams of protein    Vitamins  -What vitamins are you taking? yes  -Are you tolerating well? Struggling with calcium--discussed alternatives     Medications  Omeprazole: yes   Hycet: no, hasn't take any since the hospital  How are you tolerating pain at this time? 0 (rate on a scale from 1 to 10; >7 notify PA/MD)  Are you taking home/other medications as prescribed? Yes, no problems.    Are you having any problems? (f/u with PCP, cardiologist, endocrinologist)  How is your support system at home?--great, excellent  Exercise (light exercise at this time, no lifting above 10 lbs)--walking around     Questions for nurse/MA/PA: none     Assessment:  Doing well.     Discussion:  Continue to work on fluid and protein intake.     Confirmed date and time for 2 week po fasting labs and clinic visit

## 2017-11-22 ENCOUNTER — CLINICAL SUPPORT (OUTPATIENT)
Dept: BARIATRICS | Facility: CLINIC | Age: 52
End: 2017-11-22
Payer: COMMERCIAL

## 2017-11-22 ENCOUNTER — OFFICE VISIT (OUTPATIENT)
Dept: BARIATRICS | Facility: CLINIC | Age: 52
End: 2017-11-22
Payer: COMMERCIAL

## 2017-11-22 ENCOUNTER — LAB VISIT (OUTPATIENT)
Dept: LAB | Facility: HOSPITAL | Age: 52
End: 2017-11-22
Payer: COMMERCIAL

## 2017-11-22 VITALS
HEIGHT: 67 IN | WEIGHT: 227.5 LBS | HEART RATE: 70 BPM | BODY MASS INDEX: 35.71 KG/M2 | DIASTOLIC BLOOD PRESSURE: 62 MMHG | SYSTOLIC BLOOD PRESSURE: 110 MMHG

## 2017-11-22 DIAGNOSIS — Z98.84 S/P LAPAROSCOPIC SLEEVE GASTRECTOMY: ICD-10-CM

## 2017-11-22 DIAGNOSIS — F32.A DEPRESSION, UNSPECIFIED DEPRESSION TYPE: ICD-10-CM

## 2017-11-22 DIAGNOSIS — M79.7 FIBROMYALGIA: ICD-10-CM

## 2017-11-22 DIAGNOSIS — Z98.84 STATUS POST LAPAROSCOPIC SLEEVE GASTRECTOMY: ICD-10-CM

## 2017-11-22 DIAGNOSIS — Z98.890 POST-OPERATIVE STATE: ICD-10-CM

## 2017-11-22 DIAGNOSIS — E66.9 OBESITY, UNSPECIFIED CLASSIFICATION, UNSPECIFIED OBESITY TYPE, UNSPECIFIED WHETHER SERIOUS COMORBIDITY PRESENT: ICD-10-CM

## 2017-11-22 DIAGNOSIS — R63.4 WEIGHT LOSS: Primary | ICD-10-CM

## 2017-11-22 LAB
25(OH)D3+25(OH)D2 SERPL-MCNC: 22 NG/ML
ALBUMIN SERPL BCP-MCNC: 3.6 G/DL
ALP SERPL-CCNC: 62 U/L
ALT SERPL W/O P-5'-P-CCNC: 7 U/L
ANION GAP SERPL CALC-SCNC: 11 MMOL/L
AST SERPL-CCNC: 20 U/L
BASOPHILS # BLD AUTO: 0.09 K/UL
BASOPHILS NFR BLD: 1.4 %
BILIRUB SERPL-MCNC: 0.4 MG/DL
BUN SERPL-MCNC: 17 MG/DL
CALCIUM SERPL-MCNC: 9.2 MG/DL
CHLORIDE SERPL-SCNC: 102 MMOL/L
CHOLEST SERPL-MCNC: 126 MG/DL
CHOLEST/HDLC SERPL: 3 {RATIO}
CO2 SERPL-SCNC: 27 MMOL/L
CREAT SERPL-MCNC: 0.7 MG/DL
DIFFERENTIAL METHOD: ABNORMAL
EOSINOPHIL # BLD AUTO: 0.3 K/UL
EOSINOPHIL NFR BLD: 5.3 %
ERYTHROCYTE [DISTWIDTH] IN BLOOD BY AUTOMATED COUNT: 13.2 %
EST. GFR  (AFRICAN AMERICAN): >60 ML/MIN/1.73 M^2
EST. GFR  (NON AFRICAN AMERICAN): >60 ML/MIN/1.73 M^2
GLUCOSE SERPL-MCNC: 71 MG/DL
HCT VFR BLD AUTO: 39.1 %
HDLC SERPL-MCNC: 42 MG/DL
HDLC SERPL: 33.3 %
HGB BLD-MCNC: 13 G/DL
IMM GRANULOCYTES # BLD AUTO: 0.01 K/UL
IMM GRANULOCYTES NFR BLD AUTO: 0.2 %
IRON SERPL-MCNC: 51 UG/DL
LDLC SERPL CALC-MCNC: 69 MG/DL
LYMPHOCYTES # BLD AUTO: 1.4 K/UL
LYMPHOCYTES NFR BLD: 21.8 %
MCH RBC QN AUTO: 27.4 PG
MCHC RBC AUTO-ENTMCNC: 33.2 G/DL
MCV RBC AUTO: 83 FL
MONOCYTES # BLD AUTO: 0.5 K/UL
MONOCYTES NFR BLD: 7.1 %
NEUTROPHILS # BLD AUTO: 4.1 K/UL
NEUTROPHILS NFR BLD: 64.2 %
NONHDLC SERPL-MCNC: 84 MG/DL
NRBC BLD-RTO: 0 /100 WBC
PLATELET # BLD AUTO: 254 K/UL
PMV BLD AUTO: 13 FL
POTASSIUM SERPL-SCNC: 3.6 MMOL/L
PROT SERPL-MCNC: 6.9 G/DL
RBC # BLD AUTO: 4.74 M/UL
SATURATED IRON: 18 %
SODIUM SERPL-SCNC: 140 MMOL/L
TOTAL IRON BINDING CAPACITY: 290 UG/DL
TRANSFERRIN SERPL-MCNC: 196 MG/DL
TRIGL SERPL-MCNC: 75 MG/DL
VIT B12 SERPL-MCNC: >2000 PG/ML
WBC # BLD AUTO: 6.37 K/UL

## 2017-11-22 PROCEDURE — 80061 LIPID PANEL: CPT

## 2017-11-22 PROCEDURE — 36415 COLL VENOUS BLD VENIPUNCTURE: CPT

## 2017-11-22 PROCEDURE — 82607 VITAMIN B-12: CPT

## 2017-11-22 PROCEDURE — 80053 COMPREHEN METABOLIC PANEL: CPT

## 2017-11-22 PROCEDURE — 84425 ASSAY OF VITAMIN B-1: CPT

## 2017-11-22 PROCEDURE — 82306 VITAMIN D 25 HYDROXY: CPT

## 2017-11-22 PROCEDURE — 99999 PR PBB SHADOW E&M-EST. PATIENT-LVL IV: CPT | Mod: PBBFAC,,, | Performed by: PHYSICIAN ASSISTANT

## 2017-11-22 PROCEDURE — 85025 COMPLETE CBC W/AUTO DIFF WBC: CPT

## 2017-11-22 PROCEDURE — 83540 ASSAY OF IRON: CPT

## 2017-11-22 PROCEDURE — 99024 POSTOP FOLLOW-UP VISIT: CPT | Mod: S$GLB,,, | Performed by: PHYSICIAN ASSISTANT

## 2017-11-22 RX ORDER — FERROUS SULFATE, DRIED 160(50) MG
1 TABLET, EXTENDED RELEASE ORAL 2 TIMES DAILY WITH MEALS
COMMUNITY
End: 2019-08-20

## 2017-11-22 RX ORDER — UBIDECARENONE 75 MG
500 CAPSULE ORAL DAILY
COMMUNITY
End: 2019-08-20

## 2017-11-22 RX ORDER — MULTIVIT WITH MINERALS/HERBS
1 TABLET ORAL DAILY
COMMUNITY
End: 2020-06-30

## 2017-11-22 NOTE — PROGRESS NOTES
NUTRITION NOTE    Referring Physician: Serge Chavez M.D.  Reason for MNT Referral: Follow-up 2 Weeks s/p Gastric Sleeve    PAST MEDICAL HISTORY:  Denies nausea, vomiting, constipation and diarrhea.  Reports doing well.    Past Medical History:   Diagnosis Date    Arthritis     BMI 37.0-37.9, adult     Fibromyalgia     Obesity        CLINICAL DATA:  52 y.o. female.    There were no vitals filed for this visit.    Current Weight: 228 lbs  BMI: 35.63  Total Weight Loss: 13 lbs  Excess Weight Loss: 14%    LABS:  Reviewed.    CURRENT DIET:  Bariatric Liquid Diet  Liquid Bariatric Diet.  2-3 protein shakes (Premier RTD) daily, ~60-90 grams protein.  48-64 oz H20 and Clear SF Liquids.      EXERCISE:  Adequate light exercise.    Restrictions to Exercise: None.    VITAMINS/MINERALS:  Multivitamins: BID  B-Complex: tablet  Calcium Citrate + Vitamin D: TID  Vitamin B12: Sublingual.    ASSESSMENT:  Doing well overall.  Adequate protein intake.  Adequate fluid intake.    BARIATRIC DIET DISCUSSION:  Instructed and provided written materials on bariatric pureed diet plan.  Reinforced post-op nutrition guidelines.    PLAN/RECOMMONDATIONS:  Advance to bariatric pureed diet.  Increase protein intake.  Increase fluid intake.  Continue light exercise.  Continue appropriate vitamins & minerals.    Return to clinic in 2 weeks.    SESSION TIME: 15 minutes

## 2017-11-22 NOTE — PROGRESS NOTES
BARIATRIC POST OP FOLLOW UP:    Chief Complaint   Patient presents with    Follow-up     2wk sleeve       HISTORY OF PRESENT ILLNESS: Cathleen Saba is a 52 y.o. female with a Body mass index is 35.63 kg/m². who presents for a 2 week follow up s/p lap sleeve with Dr Chavez on 11/7/2017.  she is doing well and tolerating the diet without difficulty.  she has lost 13 lbs, approximately 14% of their excess weight.  she has no complaints.      Denies: nausea, vomiting, abdominal pain, changes in bowel movement pattern, fever, chills, dysphagia, chest pain, and shortness of breath.    Review of Systems   Constitutional: Negative for chills, fever and weight loss.   Eyes: Negative for blurred vision, double vision and pain.   Respiratory: Negative for cough, shortness of breath and wheezing.    Cardiovascular: Negative for chest pain, palpitations and leg swelling.   Gastrointestinal: Negative for abdominal pain, blood in stool, constipation, diarrhea, heartburn, melena, nausea and vomiting.   Genitourinary: Negative for dysuria, frequency and hematuria.   Skin: Negative for itching and rash.   Neurological: Negative for headaches.   Psychiatric/Behavioral: Negative for depression and suicidal ideas.       EXERCISE & VITAMINS:  See Bariatric Assessment    MEDICATIONS/ALLERGIES:  Have been reviewed.    DIET:  Liquid Bariatric Diet.  2-3 protein shakes (Premier RTD) daily, ~60-90 grams protein.  48-64 oz H20 and Clear SF Liquids.      Vitals:    11/22/17 1308   BP: 110/62   Pulse: 70       Physical Exam   Constitutional: She is oriented to person, place, and time. She appears well-developed and well-nourished. No distress.   HENT:   Head: Normocephalic and atraumatic.   Eyes: Conjunctivae are normal. Right eye exhibits no discharge. Left eye exhibits no discharge. No scleral icterus.   Cardiovascular: Normal rate, regular rhythm and normal heart sounds.    No murmur heard.  Pulmonary/Chest: Effort normal and breath  sounds normal. No respiratory distress.   Abdominal: Soft. Bowel sounds are normal. She exhibits no distension. There is no tenderness. There is no rebound and no guarding.   Well healing surgical incisions, clean, dry, and intact without signs of infection.     Musculoskeletal: She exhibits no edema.   Neurological: She is alert and oriented to person, place, and time.   Skin: Skin is warm and dry. No rash noted. She is not diaphoretic. No erythema. No pallor.   Psychiatric: She has a normal mood and affect. Her behavior is normal. Judgment and thought content normal.   Nursing note and vitals reviewed.      ASSESSMENT:  - Morbid obesity, Body mass index is 35.63 kg/m².,  s/p sleeve gastrectomy on 11/7/2017.  - Estimated goal weight, 193 lbs, which is 50% EWL  - Co-morbidities: depression  - God Weight loss, 13 lbs, 14% EWL  - Good Exercise regimen  - Good Vitamin Regimen  - Good Diet  - Not at risk for fall or abuse    PLAN:  - Emphasized the importance of regular exercise and adherence to bariatric diet to achieve maximum weight loss.  - Encouraged patient to continue regular exercise.  - Follow-up with dietician to advance diet.  - Continue daily vitamins and medications.  - Ursodiol 500 mg daily for 6 months for the gallbladder.  - Anti-Acid medication, Omeprazole daily for 3 months.  - No lifting more than 10 lbs for 4 weeks.  - Miralax daily for constipation, no fiber.  - No NSAIDs, Tylenol for pain.  - No swallowing whole pills for 3 months.  Can swallow whole pills on feb 7, 2018.  - RTC in 2 weeks or sooner if needed.  - Call the office for any issues.  - Check labs today.

## 2017-11-22 NOTE — PATIENT INSTRUCTIONS
High Protein Pureed Diet    2 weeks after gastric bypass and sleeve you may be ready to add pureed food to your diet.  All food should be the consistency of baby food, or thinner.  Follow pureed diet for the next 2 weeks.    Protein - It is very important to pay attention to protein intake during this time.      Inadequate protein intake can cause:  ? Delayed Wound Healing  ? Hair Loss  ? Muscle Breakdown    Meal Plan - Eat 3-4 meals per day (2-4 tbsp each), with protein supplements in between to meet protein needs.  Meeting protein needs daily will help increase healing, decrease muscle loss, and increase weight loss.  Your goal is  grams of protein a day.    Protein First - Always eat the foods with the highest protein first.  Foods high in protein include milk, yogurt, cheese, egg whites, and blenderized meat, seafood, and beans.    Fluids - Keep track in your journal of how much you are drinking; you should try to drink at least 64oz of fluids every day.      Foods allowed: Portion size Protein (g)   ? Sugar-free clear liquids As desired 0   ? Skim or 1% milk ½ cup 4   ? Sugar free pudding, light yogurt, custard (use skim or 1% milk in preparation) 3 oz 2.5   ? Strained baby food meats, or home-made pureed lean meats and shrimp 1 oz 7   ? Beans (red, white, black, lima, helms, fat free refried, hummus) and lentils ¼ cup 4   ? Low-fat/fat free cheese.(cottage cheese, mozzarella string cheese, ricotta cheese, Laughing Cow, Baby Bell, cheddar, etc) ¼ cup 7-8   ? Scrambled eggs or Egg Beaters 1 or ¼ cup 6   ? Edamame or Tofu, mashed ¼ cup 5   ? Unflavored protein powder (add to 1 scoop to  98% fat free soups or SF pudding) 3 Tbsp 9   ? *PB2: peanut powder (45 calories) 2 Tbsp 5     *PB2 powdered peanut butter: 45 calories vs. 190 calories in 2 tbsp of regular peanut butter. Purchase online at [employer], or  at various [employer], [employer], [employer], [employer] and [employer].      Bariatric Liquid/Pureed Sample  Menu    3-4 small meals plus 2-3 protein drinks per day.    8am 1 egg or ¼ cup Egg Beaters   9am 1 cup water, or decaf coffee or tea   10am Protein drink, 30g protein   11am 2 tbsp low-fat cottage cheese, and 1 tbsp pureed peaches   12pm 1 cup water, or sugar-free lemonade    1pm 2 tbsp pureed chicken, and 1 tbsp pureed carrots    2pm 1 cup water, or sugar-free lemonade   3pm Protein drink, 30g protein   5pm 1 cup water    6pm 1 cup hi-protein creamy chicken soup 14g protein (see Recipe below)   7pm 1 cup water, or sugar-free fruit punch    8pm 1 cup water     This sample menu provides approx. 80g protein and 64oz fluids.  Liquid protein supplements should contain 20-30g protein and less than 4 grams of sugar each.    ? Sip fluids continuously in between meals.  Drink at least ¼ cup every 15 minutes.  ? For fluids: ¼ cup = 2 oz = 4 tbsp       RECIPE IDEAS for Bariatric Pureed Diet:    Hi-Protein Creamy Chicken Soup: (10g protein per 1 cup serving)  Empty 1 can of 98% fat free cream of chicken soup into saucepan. Then  blend 1 scoop of unflavored protein powder with 1 can of skim milk until smooth.  Add protein milk to saucepan and heat to warm. (Note: Do NOT boil. Protein powder may clump if heated too hot).     Hi-Protein Pudding: (14g protein per ½ cup serving)  Add 2 scoops protein powder to 2 cups cold skim milk and mix well.  Stir in dry Jell-O Sugar-Free Instant Pudding mix.  Chill and Enjoy!    Tuna Mousse (12g protein per ¼ cup serving) Page 135 in book Eating Well After Weight Loss Surgery.  In a  or , combine all ingredients and pulse until smooth.  2 6-ounce cans tuna packed in water, drained  2 tbsp low-fat mayonnaise  2 tbsp fat-free sour cream  2 tbsp fat-free cream cheese, softened  ½ cup shallots, finely chopped  1 tbsp lemon juice  ¼ tsp ground pepper  ½ tsp celery seed    Chocolate Peanut Butter Mousse  (28g protein total)  6oz plain Greek yogurt  4 tbsp chocolate PB2

## 2017-11-27 ENCOUNTER — PATIENT MESSAGE (OUTPATIENT)
Dept: BARIATRICS | Facility: CLINIC | Age: 52
End: 2017-11-27

## 2017-11-27 LAB — VIT B1 SERPL-MCNC: 45 UG/L (ref 38–122)

## 2017-12-01 ENCOUNTER — TELEPHONE (OUTPATIENT)
Dept: BARIATRICS | Facility: CLINIC | Age: 52
End: 2017-12-01

## 2017-12-04 ENCOUNTER — OFFICE VISIT (OUTPATIENT)
Dept: BARIATRICS | Facility: CLINIC | Age: 52
End: 2017-12-04
Payer: COMMERCIAL

## 2017-12-04 ENCOUNTER — CLINICAL SUPPORT (OUTPATIENT)
Dept: BARIATRICS | Facility: CLINIC | Age: 52
End: 2017-12-04
Payer: COMMERCIAL

## 2017-12-04 VITALS
HEART RATE: 74 BPM | BODY MASS INDEX: 34.98 KG/M2 | WEIGHT: 222.88 LBS | DIASTOLIC BLOOD PRESSURE: 70 MMHG | HEIGHT: 67 IN | SYSTOLIC BLOOD PRESSURE: 125 MMHG

## 2017-12-04 DIAGNOSIS — Z98.84 S/P LAPAROSCOPIC SLEEVE GASTRECTOMY: ICD-10-CM

## 2017-12-04 DIAGNOSIS — R63.4 WEIGHT LOSS: Primary | ICD-10-CM

## 2017-12-04 DIAGNOSIS — Z98.890 POST-OPERATIVE STATE: ICD-10-CM

## 2017-12-04 PROCEDURE — 99024 POSTOP FOLLOW-UP VISIT: CPT | Mod: S$GLB,,, | Performed by: PHYSICIAN ASSISTANT

## 2017-12-04 PROCEDURE — 99999 PR PBB SHADOW E&M-EST. PATIENT-LVL III: CPT | Mod: PBBFAC,,, | Performed by: PHYSICIAN ASSISTANT

## 2017-12-04 PROCEDURE — 99499 UNLISTED E&M SERVICE: CPT | Mod: S$GLB,,, | Performed by: DIETITIAN, REGISTERED

## 2017-12-04 NOTE — PROGRESS NOTES
BARIATRIC POST OP FOLLOW UP:    Chief Complaint   Patient presents with    Follow-up     4 week sleeve       HISTORY OF PRESENT ILLNESS: Cathleen Saba is a 52 y.o. female with a Body mass index is 34.91 kg/m². who presents for a 1 month follow up s/p lap sleeve with Dr Chavez on 11/7/2017.  she is doing well and tolerating the diet without difficulty.  she has lost 18 lbs, approximately 19% of their excess weight.  she has no complaints.      Denies: nausea, vomiting, abdominal pain, changes in bowel movement pattern, fever, chills, dysphagia, chest pain, and shortness of breath.    Review of Systems   Constitutional: Negative for chills, fever and weight loss.   Eyes: Negative for blurred vision, double vision and pain.   Respiratory: Negative for cough, shortness of breath and wheezing.    Cardiovascular: Negative for chest pain, palpitations and leg swelling.   Gastrointestinal: Negative for abdominal pain, blood in stool, constipation, diarrhea, heartburn, melena, nausea and vomiting.   Genitourinary: Negative for dysuria, frequency and hematuria.   Skin: Negative for itching and rash.   Neurological: Negative for headaches.   Psychiatric/Behavioral: Negative for depression and suicidal ideas.       EXERCISE & VITAMINS:  See Bariatric Assessment    MEDICATIONS/ALLERGIES:  Have been reviewed.    DIET:  Puree Bariatric Diet.  2 protein shakes (Premier RTD) and 2-3 puree meals daily, ~60-90 grams protein.  48-64 oz H20 and Clear SF Liquids.      Vitals:    12/04/17 0810   BP: 125/70   Pulse: 74       Physical Exam   Constitutional: She is oriented to person, place, and time. She appears well-developed and well-nourished. No distress.   HENT:   Head: Normocephalic and atraumatic.   Eyes: Conjunctivae are normal. Right eye exhibits no discharge. Left eye exhibits no discharge. No scleral icterus.   Cardiovascular: Normal rate, regular rhythm and normal heart sounds.    No murmur heard.  Pulmonary/Chest: Effort  normal and breath sounds normal. No respiratory distress.   Abdominal: Soft. Bowel sounds are normal. She exhibits no distension. There is no tenderness. There is no rebound and no guarding.   WHSS   Musculoskeletal: She exhibits no edema.   Neurological: She is alert and oriented to person, place, and time.   Skin: Skin is warm and dry. No rash noted. She is not diaphoretic. No erythema. No pallor.   Psychiatric: She has a normal mood and affect. Her behavior is normal. Judgment and thought content normal.   Nursing note and vitals reviewed.      ASSESSMENT:  - Morbid obesity, Body mass index is 34.91 kg/m²., s/p sleeve gastrectomy on 11/7/2017.  - Estimated goal weight, 193 lbs, which is 50% EWL  - Co-morbidities: depression  - God Weight loss, 18 lbs, 19% EWL  - Good Exercise regimen  - Good Vitamin Regimen  - Good Diet  - Not at risk for fall or abuse    PLAN:  - Emphasized the importance of regular exercise and adherence to bariatric diet to achieve maximum weight loss.  - Encouraged patient to continue regular exercise.  - Follow-up with dietician to advance diet.  - Continue daily vitamins and medications.  - Ursodiol 500 mg daily for 6 months for the gallbladder.  - Anti-Acid medication, Omeprazole daily for 3 months.  - No lifting more than 10 lbs for 2 weeks.  - Miralax daily for constipation, no fiber.  - No NSAIDs, Tylenol for pain.  - No swallowing whole pills for 3 months.  Can swallow whole pills on feb 7, 2018.  - RTC in 2 months or sooner if needed.  - Call the office for any issues.

## 2017-12-04 NOTE — PROGRESS NOTES
NUTRITION NOTE    Referring Physician: Serge Chavez M.D.  Reason for MNT Referral: Follow-up 4 Weeks s/p Gastric Sleeve    PAST MEDICAL HISTORY:  Denies nausea, vomiting, constipation and diarrhea.  Reports doing well.    Past Medical History:   Diagnosis Date    Arthritis     BMI 37.0-37.9, adult     Fibromyalgia     Obesity        CLINICAL DATA:  52 y.o. female.    There were no vitals filed for this visit.    Current Weight: 222 lbs  BMI: 34  Total Weight Loss: 18 lbs  Excess Weight Loss: 19%    CURRENT DIET:  Bariatric PureedDiet    Diet Recall: 60-80 grams of protein/day; 80+ oz of fluids/day    Diet Includes: greek yogurt, egg whites, fish, shrimp, protein shakes, BabyBel cheese   Meal Pattern: 6-7 meal(s) + 1-2 protein supplement(s)  Adequate protein supplement intake.  Adequate dairy intake.  Adequate vegetable intake.  Adequate fruit intake.  Starchy CHO: avoids    EXERCISE:  Adequate light exercise.  Walking   Restrictions to Exercise: None.    VITAMINS/MINERALS:  Multivitamins: FC, twice daily  B-Complex: once daily, crushable  Calcium Citrate + Vitamin D: chews, taking TID  Vitamin B12: Sublingual.    ASSESSMENT:  Doing well overall.  Adequate protein intake.  Adequate fluid intake.  Advancing diet appropriately.  Exercising.  Adequate vitamins & minerals.    BARIATRIC DIET DISCUSSION:  Instructed and provided written materials on bariatric soft diet plan.  Reinforced post-op nutrition guidelines.    PLAN / RECOMMENDATIONS:  Advance to bariatric soft diet.  Maintain protein intake.  Maintain fluid intake.  Continue light exercise.  Continue appropriate vitamins & minerals.    Return to clinic in 2 months.    SESSION TIME: 10 minutes

## 2018-01-30 ENCOUNTER — TELEPHONE (OUTPATIENT)
Dept: BARIATRICS | Facility: CLINIC | Age: 53
End: 2018-01-30

## 2018-01-30 NOTE — TELEPHONE ENCOUNTER
Called patient to confirm appointment. Left message with appointment date, time and new location.

## 2018-01-31 ENCOUNTER — OFFICE VISIT (OUTPATIENT)
Dept: BARIATRICS | Facility: CLINIC | Age: 53
End: 2018-01-31
Payer: COMMERCIAL

## 2018-01-31 ENCOUNTER — CLINICAL SUPPORT (OUTPATIENT)
Dept: BARIATRICS | Facility: CLINIC | Age: 53
End: 2018-01-31
Payer: COMMERCIAL

## 2018-01-31 VITALS
HEART RATE: 85 BPM | SYSTOLIC BLOOD PRESSURE: 105 MMHG | DIASTOLIC BLOOD PRESSURE: 74 MMHG | WEIGHT: 208.75 LBS | HEIGHT: 67 IN | BODY MASS INDEX: 32.76 KG/M2

## 2018-01-31 DIAGNOSIS — Z98.890 POST-OPERATIVE STATE: ICD-10-CM

## 2018-01-31 DIAGNOSIS — Z98.84 S/P LAPAROSCOPIC SLEEVE GASTRECTOMY: Primary | ICD-10-CM

## 2018-01-31 DIAGNOSIS — R63.4 WEIGHT LOSS: ICD-10-CM

## 2018-01-31 DIAGNOSIS — E66.09 CLASS 1 OBESITY DUE TO EXCESS CALORIES WITHOUT SERIOUS COMORBIDITY WITH BODY MASS INDEX (BMI) OF 32.0 TO 32.9 IN ADULT: ICD-10-CM

## 2018-01-31 PROBLEM — N93.9 ABNORMAL UTERINE BLEEDING (AUB): Status: RESOLVED | Noted: 2017-08-09 | Resolved: 2018-01-31

## 2018-01-31 PROBLEM — E66.811 CLASS 1 OBESITY DUE TO EXCESS CALORIES WITHOUT SERIOUS COMORBIDITY WITH BODY MASS INDEX (BMI) OF 32.0 TO 32.9 IN ADULT: Status: ACTIVE | Noted: 2017-08-23

## 2018-01-31 PROCEDURE — 99999 PR PBB SHADOW E&M-EST. PATIENT-LVL IV: CPT | Mod: PBBFAC,,, | Performed by: PHYSICIAN ASSISTANT

## 2018-01-31 PROCEDURE — 99499 UNLISTED E&M SERVICE: CPT | Mod: S$GLB,,, | Performed by: DIETITIAN, REGISTERED

## 2018-01-31 PROCEDURE — 99024 POSTOP FOLLOW-UP VISIT: CPT | Mod: S$GLB,,, | Performed by: PHYSICIAN ASSISTANT

## 2018-01-31 RX ORDER — TRIAMCINOLONE ACETONIDE 1 MG/G
1 CREAM TOPICAL 2 TIMES DAILY
Refills: 1 | COMMUNITY
Start: 2018-01-25 | End: 2019-07-17

## 2018-01-31 NOTE — PROGRESS NOTES
BARIATRIC POST OP FOLLOW UP:    Chief Complaint   Patient presents with    Follow-up     3 month sleeve       HISTORY OF PRESENT ILLNESS: Cathleen Saba is a 52 y.o. female with a Body mass index is 32.7 kg/m². who presents for a 3 month follow up s/p lap sleeve with Dr Chavez on 11/7/2017.  she is doing well and tolerating the diet without difficulty. she has lost 32 lbs, approximately 34% of their excess weight.  she has no complaints.      Denies: nausea, vomiting, abdominal pain, changes in bowel movement pattern, fever, chills, dysphagia, chest pain, and shortness of breath.    Review of Systems   Constitutional: Negative for chills, fever and weight loss.   Eyes: Negative for blurred vision, double vision and pain.   Respiratory: Negative for cough, shortness of breath and wheezing.    Cardiovascular: Negative for chest pain, palpitations and leg swelling.   Gastrointestinal: Negative for abdominal pain, blood in stool, constipation, diarrhea, heartburn, melena, nausea and vomiting.   Genitourinary: Negative for dysuria, frequency and hematuria.   Skin: Negative for itching and rash.   Neurological: Negative for headaches.   Psychiatric/Behavioral: Negative for depression and suicidal ideas.       EXERCISE & VITAMINS:  See Bariatric Assessment    MEDICATIONS/ALLERGIES:  Have been reviewed.    DIET:  Soft Bariatric Diet.  1 protein shakes (Premier RTD), 2-15 gm prot yogurts, and 2-3 soft meals daily, ~80+ grams protein.  48-64 oz H20 and Clear SF Liquids.      Vitals:    01/31/18 0833   BP: 105/74   Pulse: 85       Physical Exam   Constitutional: She is oriented to person, place, and time. She appears well-developed and well-nourished. No distress.   HENT:   Head: Normocephalic and atraumatic.   Eyes: Conjunctivae are normal. Right eye exhibits no discharge. Left eye exhibits no discharge. No scleral icterus.   Cardiovascular: Normal rate, regular rhythm and normal heart sounds.    No murmur  heard.  Pulmonary/Chest: Effort normal and breath sounds normal. No respiratory distress.   Abdominal: Soft. Bowel sounds are normal. She exhibits no distension. There is no tenderness. There is no rebound and no guarding.   WHSS   Musculoskeletal: She exhibits no edema.   Neurological: She is alert and oriented to person, place, and time.   Skin: Skin is warm and dry. No rash noted. She is not diaphoretic. No erythema. No pallor.   Psychiatric: She has a normal mood and affect. Her behavior is normal. Judgment and thought content normal.   Nursing note and vitals reviewed.      ASSESSMENT:  - Morbid obesity, Body mass index is 32.7 kg/m²., s/p sleeve gastrectomy on 11/7/2017.  - Estimated goal weight, 193 lbs, which is 50% EWL  - Co-morbidities: depression  - Good Weight loss, 32 lbs, 34% EWL  - Good Exercise regimen  - Good Vitamin Regimen  - Good Diet  - Not at risk for fall or abuse    PLAN:  - Emphasized the importance of regular exercise and adherence to bariatric diet to achieve maximum weight loss.  - Encouraged patient to continue regular exercise.  - Follow-up with dietician to advance/ reinforce diet.  - Continue daily vitamins and medications.  - Ursodiol 500 mg daily for 6 months for the gallbladder: therapy will complete in May 2018.  - Okay to discontinue omeprazole, slow taper discussed and instructions provided.   - No restrictions to exercise.  - Miralax daily for constipation, no fiber.  - No NSAIDs, Tylenol for pain.  - Can swallow whole pills on feb 7, 2018.  - RTC in 3 months or sooner if needed.  - Call the office for any issues.

## 2018-01-31 NOTE — PROGRESS NOTES
NUTRITION NOTE    Referring Physician: Serge Chavez M.D.  Reason for MNT Referral: Follow-up 4 Weeks s/p Gastric Sleeve    PAST MEDICAL HISTORY:  Denies nausea, vomiting, constipation and diarrhea.  Reports doing well.    Past Medical History:   Diagnosis Date    Arthritis     BMI 37.0-37.9, adult     Fibromyalgia     Obesity        CLINICAL DATA:  52 y.o. female.    There were no vitals filed for this visit.    Current Weight: 208 lbs  BMI: 32.7  Total Weight Loss: 32 lbs  Excess Weight Loss: 34%    LABS:  Reviewed.    CURRENT DIET:  Bariatric Soft Diet    Diet Recall: 80+ grams of protein/day; 48-64 oz of fluids/day   Diet Includes: 2-15 gram greek yogurt, chicken, fish, beans    Meal Pattern: 2-3 meal(s) + 1 snack(s) + 1 protein supplement(s)  Adequate protein supplement intake.  Adequate dairy intake.  Adequate vegetable intake.  Adequate fruit intake.  Starchy CHO: avoids    EXERCISE:  Adequate light exercise.    Restrictions to Exercise: None.    VITAMINS/MINERALS:  Multivitamins: FC, taking BID  B-Complex: taking once daily  Calcium Citrate + Vitamin D: BA, chewable, taking TID  Vitamin B12: Sublingual.    ASSESSMENT:  Doing well overall.  Adequate protein intake.  Adequate fluid intake.  Advancing diet appropriately.  Exercising.  Adequate vitamins & minerals.    BARIATRIC DIET DISCUSSION:  Instructed and provided written materials on bariatric solid/regular diet plan.  Reinforced post-op nutrition guidelines.    PLAN / RECOMMENDATIONS:  Advance to bariatric solid/regular diet.  Maintain protein intake.  Maintain fluid intake.  Continue light exercise.  Continue appropriate vitamins & minerals.    Return to clinic in 2 months.    SESSION TIME: 10 minutes

## 2018-01-31 NOTE — PATIENT INSTRUCTIONS
STRETCHING  Stretching involves the ability to move joints and muscles through their full range of motion. It is a neglected area of most fitness routines. Stretching must be an integral part of a workout because it keeps the muscles and joints loose. In addition, it protects against injury, improves blood flow, and increases tendon flexibility. Stretching can be performed at any time of the day and practically anywhere. All one needs is a padded surface or exercise mat.    Stretching should be done for about 10 to 12 minutes and should cover all the muscle groups. Patients should be encouraged to stretch to the point that they can feel some minor discomfort, hold the stretch for 15 to 20 seconds, and repeat 2 to 3 times. Stretching should be done at least 2 to 3 times per week. The following are some basic stretches targeting the major muscle groups:    · Enterprise stretch (hamstrings, lower back): Sit on the floor with your legs outstretched in front of you and your feet together. Bend forward at the waist and reach toward your toes with your hands.  · Straddle stretch (groin, upper back, obliques): Sit on the floor and spread your legs apart. Reach your right hand toward your left foot. Hold for 15 to 20 seconds. Reach your left hand toward your right foot.  · Cat stretch (back, triceps, laterals): Kneel on the floor with your forearms and hands outstretched on the floor in front of you. Slide your forearms forward as far as possible while trying to keep your thighs perpendicular to the floor. Maintaining this position, while supporting yourself with your arms and shoulders, attempt to lower and press your abdomen to the floor. Try to hold this position for 15 to 20 seconds.  · Door push (chest):  front of a doorway, step slightly in, and press both hands and arms against the door frame. Lean forward.  · Shoulder stretch: In a seated position with your back upright, grab your elbow with the opposite hand.  Gently pull across your body. Hold for 15 to 20 seconds. Repeat with the other arm.    STRENGTHENING  An adequate resistance training program could include the following types of exercise, focusing on the major muscle groups. One can use 5 to 10 pound dumbbells for those exercises that require the use of weight. At home, one can use a household item that provides a comfortable weight, such as a milk carton or beverage container. These exercises can also be performed without weights.    · Chest (Bench Press): Hold the weights with your hands. Lying on a flat surface, with knees bent and feet flat, slowly bring the weights to the chest area with palms facing upward. Begin to exhale and press the weights up, fully extending the arms, and keeping them above your eyes. Inhale as you lower them to the starting position and repeat the movement.  · Back (Bent-Over Row): Start by placing your feet shoulder-width apart.  a weight with each hand just outside the knees. Keeping the back straight and the knees flexed, slightly bend forward at the waist. Let the arms hang naturally while holding the weights. From this starting position, pull the weights to the lower abdomen, keeping the elbows close to the body. Exhale as you pull. Return to the starting position, inhaling as you go.  · Arms (Bicep Curls): Hold a weight in each hand, with elbows at your sides, palms facing forward. The back should be straight, the chest flared outward. Begin to bend your right arm up first while exhaling, keeping the elbow totally stationary. Wait until the right arm goes completely down to the fully extended position, and then begin to curl the left arm. Each arm curled completes one full repetition.  · Shoulders (Lateral Raises): Place the feet a few inches apart with the knees bent slightly. Keep the back erect as you lean forward slightly. With the weights in front of your thighs and palms facing together, begin to slowly raise them up to  "the side until parallel with the floor. Lower the weights to your thighs, and repeat.  · Legs (Stiff Leg Dead Lift): Start by standing straight, holding the weights close to your sides, nearly touching your thighs. Keep the weights close to the body--this protects the back. The back must stay straight. Bend at the waist as far forward as you comfortably can while keeping your legs straight, and begin to feel the pull in your hamstrings as you lower the weights toward the ground. Slowly return to the starting position, keeping the back straight.  · Legs (Dumbbell Lunge): Hold a weight in each hand, arms hanging at your sides. Step out with one leg, keeping the back straight. It is important to step out far enough so that the knee does not extend over the toe. This puts too much stress on the knee. Go down far enough so that the opposite knee nearly touches the ground. Keep this stance and repeat the lunging motion for several repetitions.  · Abdominals: Do not perform standard sit-ups as these could hurt the lower back. Rather, focus on the following 2 exercises: (1) Obliques--Lie on your back with the knees flexed in the bent sit-up position. From this position, bring both knees down to the ground. With the back remaining flat, begin to flex your body toward your toes ("crunch"). Bring your shoulders up off the ground, but go slowly, controlling your momentum. Repeat this for 10 to 15 times. (2) Seated bench kicks/deniz knives--Sit on the end of a bench or chair with the hands placed behind the buttocks. Begin to kick the legs outward with the knees bent slightly--at the same time, lean back to extend the torso. Come back to the beginning position and repeat this motion 10 to 15 times    Fruits and Vegetables       Include 1-2 servings of fruit daily.      1 serving of fruit includes ½ cup unsweetened applesauce, ½ medium banana, tennis ball size piece of fruit, 17 grapes, 1 cup melon, 1 cup strawberries, ¼ cup " dried fruit     Include 2-3 servings of vegetables daily. 1 serving is 1 cup raw or ½ cup cooked.     Non-starchy vegetables include artichoke, asparagus, baby corn, bamboo shoots, beans: green/Italian/wax, bean sprouts, beets, broccoli, Cascade sprouts, cabbage, carrots, cauliflower, celery, cucumber, eggplant, green onions or scallions, greens, jicama, leeks, mushrooms, okra, onions, pea pods, peppers, radishes, spinach, summer squash, tomatoes and salsa, turnips, vegetable juice cocktail, water chestnuts, zucchini      Remember these principles:   No liquids with meals. Do no drink 30 minutes before meals and wait 30 minutes to 1 hour after meals to start drinking.   Eat PROTEIN rich foods first at every meal or snack.   Sip on water, sugar-free beverages or non-fat milk throughout the day.  You will need to continue drinking at least 1 protein drink daily to meet protein needs.   100% fruit juice (no sugar added) is allowed, but limit to 4oz a day because it is high in calories and does not contain any protein.   Chew foods slowly; one meal should take 20-30 minutes.   Eat 5 meals (3 solid meals and 2 protein shakes) per day, without any additional snacking.   Stop eating as soon as you feel full.   Avoid using table sugar and foods made with refined sugar, which can trigger dumping syndrome.   Marinating meats with a low sugar marinade, adding low-fat salad dressing, or adding low calorie gravy (made from powder and water) can help meats to digest easier.     May add:  Raw veggies  Lettuce: start with baby spinach leaves  Plain, Unsalted Nuts and Seeds: almonds, peanuts, pistachios.  1 serving daily or less  Protein bars with 0-4 grams of sugar, see attached handout    Snacks: (100-200 calories; >5g protein)    - 1 low-fat cheese stick with 8 cherry tomatoes or 1 serving fresh fruit  - 4 thin slices fat-free turkey breast and 1 slice low-fat cheese  - 4 thin slices fat-free honey ham with wedge of  "melon  - 2 slices of turkey hernandez  - Boiled eggs (can buy at [employer] already boiled w/ shell removed)  - for convenience,  [place] read, snack, go (deli meat and cheese rolls)  - P3 packets (Protein packs w/ cheese, nuts, lean deli meat)  - MHP Fit and Lean Protein Pudding (find at [employer] - per 1 cup serving = 100 calories, 15 g protein, 0 g sugar)  - 6-8 edamame pods (equivalent to about 1/4 cup edamame without pods).   - 1/4 cup unsalted nuts with ½ cup fruit  - 6-oz container Dannon Light n Fit vanilla yogurt, topped with 1oz unsalted nuts         - apple, celery or baby carrots spread with 2 Tbsp PB2  - apple slices with 1 oz slice low-fat cheese  - Apple slices dipped in 2 Tbsp of PB2  - 2 Tbsp PB2 mixed in light or greek yogurt or sugar-free pudding  - celery, cucumber, bell pepper or baby carrots dipped in ¼ cup hummus bean spread   - celery, cucumber, baby carrots dipped in high protein greek yogurt (Mix 16 oz plain greek yogurt + 1 packet of hidden [place] ranch dip mix)  - Elliott Links Beef Steak - 14g protein! (similar to beef jerky but very lean)  - 2 wedges Laughing Cow - Light Herb & Garlic Cheese with sliced cucumber or green bell pepper  - 1/2 cup low-fat cottage cheese with ¼ cup fruit or ¼ cup salsa  - 1/2 cup low fat cottage cheese with 10-15 cherry tomatoes  - 8 oz glass of FAIRLIFE fat free milk (13 g protein)  - 8 oz glass of FAIRLIFE fat free milk + 1 packet of sugar-free hot cocoa  - Add Atkins advantage Cafe Caramel shake to decaf coffee. Serve hot or blend with ice for "frappaccino" like drink  - RTD Protein drinks: Atkins, Low Carb Slim Fast, EAS light, Muscle Milk Light, etc.  - Homemade Protein drinks: GNC Soy95, Isopure, Nectar, UNJURY, Whey Gourmet, etc. Mix 1 scoop powder with 8oz skim/1% milk or light soymilk.  - Protein bars: Atkins, EAS, Pure Protein,  Quest, Think Thin, Detour, etc. Must have 0-4 grams sugar - Read the label.    ** Be CREATIVE. You can always snack on " bites of grilled chicken or tuna salad made with low fat andres, if needed!

## 2018-02-01 NOTE — TELEPHONE ENCOUNTER
Called patient to discuss vitamin D. Patient to take weekly vitamin D. Patient verbalized understanding. Patient stated needed refill. Verified pharmacy.

## 2018-02-01 NOTE — TELEPHONE ENCOUNTER
----- Message from Liv Fonseca PA-C sent at 2/1/2018  9:25 AM CST -----  Low vit d, cont once weekly vit d

## 2018-02-02 RX ORDER — ERGOCALCIFEROL 1.25 MG/1
50000 CAPSULE ORAL
Qty: 12 CAPSULE | Refills: 0 | Status: SHIPPED | OUTPATIENT
Start: 2018-02-02 | End: 2019-07-17

## 2019-08-21 ENCOUNTER — TELEPHONE (OUTPATIENT)
Dept: SURGERY | Facility: CLINIC | Age: 54
End: 2019-08-21

## 2019-08-21 PROBLEM — K80.63 CALCULUS OF GALLBLADDER AND BILE DUCT WITH ACUTE CHOLECYSTITIS, WITH OBSTRUCTION: Status: ACTIVE | Noted: 2019-08-21

## 2019-08-21 PROBLEM — R74.01 TRANSAMINITIS: Status: ACTIVE | Noted: 2019-08-21

## 2019-08-21 PROBLEM — K81.0 ACUTE CHOLECYSTITIS: Status: ACTIVE | Noted: 2019-08-21

## 2019-08-21 PROBLEM — G47.00 INSOMNIA: Status: ACTIVE | Noted: 2019-08-21

## 2019-08-21 NOTE — TELEPHONE ENCOUNTER
----- Message from Balbina Banda sent at 8/21/2019  8:51 AM CDT -----  Contact: self  Pt is calling in regards to scheduling an appt for today or tomorrow for a gallbladder flare up. The first available appt is 08/26. Pt would like to be seen sooner.    She would like a call back at [phone].    Thank you

## 2019-08-22 PROBLEM — G44.89 OTHER HEADACHE SYNDROME: Status: ACTIVE | Noted: 2019-08-22

## 2019-08-28 ENCOUNTER — TELEPHONE (OUTPATIENT)
Dept: SURGERY | Facility: CLINIC | Age: 54
End: 2019-08-28

## 2019-08-28 NOTE — TELEPHONE ENCOUNTER
----- Message from David Mccauley sent at 8/28/2019 10:29 AM CDT -----  Contact: pt @ [phone]  Pt is calling to schedule post op appt. Pt is asking for a call back today.

## 2019-09-05 ENCOUNTER — OFFICE VISIT (OUTPATIENT)
Dept: SURGERY | Facility: CLINIC | Age: 54
End: 2019-09-05
Payer: COMMERCIAL

## 2019-09-05 VITALS
BODY MASS INDEX: 32.73 KG/M2 | RESPIRATION RATE: 16 BRPM | TEMPERATURE: 99 F | WEIGHT: 215.25 LBS | SYSTOLIC BLOOD PRESSURE: 125 MMHG | DIASTOLIC BLOOD PRESSURE: 75 MMHG | HEART RATE: 68 BPM

## 2019-09-05 DIAGNOSIS — Z98.890 POST-OPERATIVE STATE: Primary | ICD-10-CM

## 2019-09-05 PROCEDURE — 99024 PR POST-OP FOLLOW-UP VISIT: ICD-10-PCS | Mod: S$GLB,,, | Performed by: SURGERY

## 2019-09-05 PROCEDURE — 99999 PR PBB SHADOW E&M-EST. PATIENT-LVL III: ICD-10-PCS | Mod: PBBFAC,,, | Performed by: SURGERY

## 2019-09-05 PROCEDURE — 99024 POSTOP FOLLOW-UP VISIT: CPT | Mod: S$GLB,,, | Performed by: SURGERY

## 2019-09-05 PROCEDURE — 99999 PR PBB SHADOW E&M-EST. PATIENT-LVL III: CPT | Mod: PBBFAC,,, | Performed by: SURGERY

## 2019-09-06 NOTE — PROGRESS NOTES
Cathleen Saba is a 53 y.o. female patient.   Two weeks status post laparoscopic cholecystectomy  She is doing well after surgery, no issues.  Tolerating diet, no nausea vomiting.    No diagnosis found.  Past Medical History:   Diagnosis Date    Arthritis     BMI 37.0-37.9, adult     Fibromyalgia     Obesity      No past surgical history pertinent negatives on file.  Scheduled Meds:  Continuous Infusions:  PRN Meds:    Review of patient's allergies indicates:   Allergen Reactions    Pcn [penicillins] Other (See Comments)     Doesn't know reaction     There are no hospital problems to display for this patient.    Blood pressure 125/75, pulse 68, temperature 98.6 °F (37 °C), resp. rate 16, weight 97.7 kg (215 lb 4.5 oz), last menstrual period 09/26/2013.    Subjective:   Diet: Adequate intake.  Patient reports no nausea.    Activity level: Normal.    Pain control: Well controlled.      Objective:  Vital signs (most recent): Blood pressure 125/75, pulse 68, temperature 98.6 °F (37 °C), resp. rate 16, weight 97.7 kg (215 lb 4.5 oz), last menstrual period 09/26/2013.  General appearance: Comfortable.    Lungs:  Normal effort.    Heart: Normal rate.    Abdomen: Abdomen is soft.    Bowel sounds:  Bowel sounds are normal.    Wound:  Clean.    Extremities: There is normal range of motion.    Neurological: The patient is alert.     Pathology:  7 cm gallstone   Assessment:   Condition: In stable condition.        53-year-old female status post laparoscopic cholecystectomy, doing well  Turn to clinic pRenaldortasha Dawson MD  9/6/2019

## 2019-11-06 PROBLEM — E55.9 VITAMIN D DEFICIENCY: Chronic | Status: ACTIVE | Noted: 2019-11-06

## 2020-02-18 ENCOUNTER — TELEPHONE (OUTPATIENT)
Dept: ORTHOPEDICS | Facility: CLINIC | Age: 55
End: 2020-02-18

## 2020-02-18 NOTE — TELEPHONE ENCOUNTER
----- Message from Maryanne Templeton sent at 2/18/2020 12:14 PM CST -----  Contact: patient  Please call above patient at [phone] will be a new patient would like to be seen today waiting on a call from the nurse thanks.

## 2020-02-18 NOTE — TELEPHONE ENCOUNTER
Spoke with the patient. She stated that she hit her knee against her car and has been having pain and swelling since. She was made aware that Dr Meyer would not be available until next Thursday and even then he is booked out to the end of April. She was offered a sooner appointment with Dr Barkley. She stated that before making an appointment with a provider she will ice her knee and try OTC antiinflammatories and call the clinic if needed. No further issues discussed.

## 2020-02-18 NOTE — TELEPHONE ENCOUNTER
----- Message from Maryanne Templeton sent at 2/18/2020  8:46 AM CST -----  Contact: patient  Please call above patient at [phone] would be a new patient,would like to get in today to see an orthopedic surgeon waiting on a call back thanks.

## 2020-06-30 PROBLEM — K81.0 ACUTE CHOLECYSTITIS: Status: RESOLVED | Noted: 2019-08-21 | Resolved: 2020-06-30

## 2020-06-30 PROBLEM — K80.63 CALCULUS OF GALLBLADDER AND BILE DUCT WITH ACUTE CHOLECYSTITIS, WITH OBSTRUCTION: Status: RESOLVED | Noted: 2019-08-21 | Resolved: 2020-06-30

## 2020-06-30 PROBLEM — R74.01 TRANSAMINITIS: Status: RESOLVED | Noted: 2019-08-21 | Resolved: 2020-06-30

## 2020-07-20 ENCOUNTER — OFFICE VISIT (OUTPATIENT)
Dept: PRIMARY CARE CLINIC | Facility: CLINIC | Age: 55
End: 2020-07-20
Payer: COMMERCIAL

## 2020-07-20 VITALS
HEART RATE: 71 BPM | DIASTOLIC BLOOD PRESSURE: 88 MMHG | TEMPERATURE: 98 F | OXYGEN SATURATION: 98 % | RESPIRATION RATE: 18 BRPM | SYSTOLIC BLOOD PRESSURE: 124 MMHG

## 2020-07-20 DIAGNOSIS — Z03.818 ENCOUNTER FOR OBSERVATION FOR SUSPECTED EXPOSURE TO OTHER BIOLOGICAL AGENTS RULED OUT: ICD-10-CM

## 2020-07-20 DIAGNOSIS — Z20.822 SUSPECTED COVID-19 VIRUS INFECTION: Primary | ICD-10-CM

## 2020-07-20 PROCEDURE — U0003 INFECTIOUS AGENT DETECTION BY NUCLEIC ACID (DNA OR RNA); SEVERE ACUTE RESPIRATORY SYNDROME CORONAVIRUS 2 (SARS-COV-2) (CORONAVIRUS DISEASE [COVID-19]), AMPLIFIED PROBE TECHNIQUE, MAKING USE OF HIGH THROUGHPUT TECHNOLOGIES AS DESCRIBED BY CMS-2020-01-R: HCPCS

## 2020-07-20 PROCEDURE — 99203 OFFICE O/P NEW LOW 30 MIN: CPT | Mod: S$GLB,,, | Performed by: NURSE PRACTITIONER

## 2020-07-20 PROCEDURE — 99203 PR OFFICE/OUTPT VISIT, NEW, LEVL III, 30-44 MIN: ICD-10-PCS | Mod: S$GLB,,, | Performed by: NURSE PRACTITIONER

## 2020-07-20 NOTE — PROGRESS NOTES
Subjective:        Time seen by provider: 9:10 AM on 07/20/2020    Nadia Saba is a 54 y.o. female who presents for an evaluation of possible COVID-19. She complains of a cough, wheezing, and a HA. The patient states her symptoms began 3-4 days ago and reports she had had no known positive exposure. She denies fever or any other symptoms at this time. No pulmonary PMHx or PSHx.     Review of Systems   Constitutional: Negative for activity change, appetite change, fatigue and fever.   HENT: Negative for congestion, rhinorrhea and sore throat.    Respiratory: Positive for cough and wheezing. Negative for chest tightness and shortness of breath.    Cardiovascular: Negative for chest pain and palpitations.   Gastrointestinal: Negative for diarrhea, nausea and vomiting.   Musculoskeletal: Negative for arthralgias and myalgias.   Skin: Negative for rash.   Neurological: Positive for headaches. Negative for weakness, light-headedness and numbness.       Objective:      Physical Exam  Vitals signs and nursing note reviewed.   Constitutional:       General: She is not in acute distress.     Appearance: She is well-developed. She is not diaphoretic.   HENT:      Head: Normocephalic and atraumatic.      Nose: Nose normal.   Eyes:      Conjunctiva/sclera: Conjunctivae normal.   Neck:      Musculoskeletal: Normal range of motion.   Cardiovascular:      Rate and Rhythm: Normal rate and regular rhythm.      Heart sounds: Normal heart sounds. No murmur.   Pulmonary:      Effort: No respiratory distress.      Breath sounds: Normal breath sounds. No wheezing.   Musculoskeletal: Normal range of motion.   Skin:     General: Skin is warm and dry.   Neurological:      Mental Status: She is alert and oriented to person, place, and time.         Assessment:       1. Suspected Covid-19 Virus Infection    2. Encounter for observation for suspected exposure to other biological agents ruled out        Plan:       1. Suspected Covid-19 Virus  Infection    2. Encounter for observation for suspected exposure to other biological agents ruled out  - COVID-19 Routine Screening    2. Discharge home and await results.   3. Return to clinic or ED for new or worsening symptoms.   4. Follow-up with PCP as needed.     Scribe Attestation:   I, Jane Serna, am scribing for, and in the presence of, DIANE Doyle. I performed the above scribed service and the documentation accurately describes the services I performed. I attest to the accuracy of the note.    I, DIANE Peterson, personally performed the services described in this documentation. All medical record entries made by the scribe were at my direction and in my presence.  I have reviewed the chart and agree that the record reflects my personal performance and is accurate and complete. DIANE Peterson.  9:20 AM 07/20/2020

## 2020-07-20 NOTE — PATIENT INSTRUCTIONS

## 2020-07-21 LAB — SARS-COV-2 RNA RESP QL NAA+PROBE: NOT DETECTED

## 2021-04-16 ENCOUNTER — PATIENT MESSAGE (OUTPATIENT)
Dept: RESEARCH | Facility: HOSPITAL | Age: 56
End: 2021-04-16

## 2021-08-11 ENCOUNTER — TELEPHONE (OUTPATIENT)
Dept: ORTHOPEDICS | Facility: CLINIC | Age: 56
End: 2021-08-11

## 2021-09-13 ENCOUNTER — TELEPHONE (OUTPATIENT)
Dept: ORTHOPEDICS | Facility: CLINIC | Age: 56
End: 2021-09-13

## 2021-09-13 DIAGNOSIS — M25.561 RIGHT KNEE PAIN, UNSPECIFIED CHRONICITY: Primary | ICD-10-CM

## 2021-09-13 DIAGNOSIS — R52 PAIN: ICD-10-CM

## 2021-09-21 ENCOUNTER — OFFICE VISIT (OUTPATIENT)
Dept: ORTHOPEDICS | Facility: CLINIC | Age: 56
End: 2021-09-21
Payer: COMMERCIAL

## 2021-09-21 VITALS
WEIGHT: 260.06 LBS | BODY MASS INDEX: 39.41 KG/M2 | HEIGHT: 68 IN | HEART RATE: 75 BPM | SYSTOLIC BLOOD PRESSURE: 124 MMHG | RESPIRATION RATE: 18 BRPM | DIASTOLIC BLOOD PRESSURE: 87 MMHG

## 2021-09-21 DIAGNOSIS — M17.11 PRIMARY OSTEOARTHRITIS OF RIGHT KNEE: Primary | ICD-10-CM

## 2021-09-21 PROCEDURE — 1160F RVW MEDS BY RX/DR IN RCRD: CPT | Mod: CPTII,S$GLB,, | Performed by: ORTHOPAEDIC SURGERY

## 2021-09-21 PROCEDURE — 3008F PR BODY MASS INDEX (BMI) DOCUMENTED: ICD-10-PCS | Mod: CPTII,S$GLB,, | Performed by: ORTHOPAEDIC SURGERY

## 2021-09-21 PROCEDURE — 99204 OFFICE O/P NEW MOD 45 MIN: CPT | Mod: S$GLB,,, | Performed by: ORTHOPAEDIC SURGERY

## 2021-09-21 PROCEDURE — 3079F PR MOST RECENT DIASTOLIC BLOOD PRESSURE 80-89 MM HG: ICD-10-PCS | Mod: CPTII,S$GLB,, | Performed by: ORTHOPAEDIC SURGERY

## 2021-09-21 PROCEDURE — 99999 PR PBB SHADOW E&M-EST. PATIENT-LVL III: ICD-10-PCS | Mod: PBBFAC,,, | Performed by: ORTHOPAEDIC SURGERY

## 2021-09-21 PROCEDURE — 3008F BODY MASS INDEX DOCD: CPT | Mod: CPTII,S$GLB,, | Performed by: ORTHOPAEDIC SURGERY

## 2021-09-21 PROCEDURE — 1159F PR MEDICATION LIST DOCUMENTED IN MEDICAL RECORD: ICD-10-PCS | Mod: CPTII,S$GLB,, | Performed by: ORTHOPAEDIC SURGERY

## 2021-09-21 PROCEDURE — 99999 PR PBB SHADOW E&M-EST. PATIENT-LVL III: CPT | Mod: PBBFAC,,, | Performed by: ORTHOPAEDIC SURGERY

## 2021-09-21 PROCEDURE — 99204 PR OFFICE/OUTPT VISIT, NEW, LEVL IV, 45-59 MIN: ICD-10-PCS | Mod: S$GLB,,, | Performed by: ORTHOPAEDIC SURGERY

## 2021-09-21 PROCEDURE — 3074F PR MOST RECENT SYSTOLIC BLOOD PRESSURE < 130 MM HG: ICD-10-PCS | Mod: CPTII,S$GLB,, | Performed by: ORTHOPAEDIC SURGERY

## 2021-09-21 PROCEDURE — 1159F MED LIST DOCD IN RCRD: CPT | Mod: CPTII,S$GLB,, | Performed by: ORTHOPAEDIC SURGERY

## 2021-09-21 PROCEDURE — 3079F DIAST BP 80-89 MM HG: CPT | Mod: CPTII,S$GLB,, | Performed by: ORTHOPAEDIC SURGERY

## 2021-09-21 PROCEDURE — 3074F SYST BP LT 130 MM HG: CPT | Mod: CPTII,S$GLB,, | Performed by: ORTHOPAEDIC SURGERY

## 2021-09-21 PROCEDURE — 1160F PR REVIEW ALL MEDS BY PRESCRIBER/CLIN PHARMACIST DOCUMENTED: ICD-10-PCS | Mod: CPTII,S$GLB,, | Performed by: ORTHOPAEDIC SURGERY

## 2021-09-21 RX ORDER — DICLOFENAC SODIUM 75 MG/1
75 TABLET, DELAYED RELEASE ORAL 2 TIMES DAILY
Qty: 60 TABLET | Refills: 1 | Status: SHIPPED | OUTPATIENT
Start: 2021-09-21 | End: 2022-02-15

## 2021-09-27 PROBLEM — M17.11 OSTEOARTHRITIS OF RIGHT KNEE: Status: ACTIVE | Noted: 2021-09-27

## 2022-01-05 ENCOUNTER — TELEPHONE (OUTPATIENT)
Dept: BARIATRICS | Facility: CLINIC | Age: 57
End: 2022-01-05
Payer: COMMERCIAL

## 2022-01-05 NOTE — TELEPHONE ENCOUNTER
----- Message from Cami Rivera sent at 1/5/2022 11:21 AM CST -----  Contact: Patient  Type: Needs Medical Advice    Who Called:  Patient    Best Call Back Number: [phone]    Additional Information: Patient would like to speak to nurse before scheduling appt.  Please call back.  Thanks.

## 2022-02-09 ENCOUNTER — PATIENT MESSAGE (OUTPATIENT)
Dept: ORTHOPEDICS | Facility: CLINIC | Age: 57
End: 2022-02-09
Payer: COMMERCIAL

## 2022-02-10 ENCOUNTER — OFFICE VISIT (OUTPATIENT)
Dept: BARIATRICS | Facility: CLINIC | Age: 57
End: 2022-02-10
Payer: COMMERCIAL

## 2022-02-10 VITALS
TEMPERATURE: 98 F | WEIGHT: 268.75 LBS | RESPIRATION RATE: 16 BRPM | SYSTOLIC BLOOD PRESSURE: 163 MMHG | BODY MASS INDEX: 42.18 KG/M2 | DIASTOLIC BLOOD PRESSURE: 82 MMHG | HEART RATE: 75 BPM | HEIGHT: 67 IN

## 2022-02-10 DIAGNOSIS — E66.01 MORBID OBESITY WITH BMI OF 40.0-44.9, ADULT: ICD-10-CM

## 2022-02-10 DIAGNOSIS — M17.11 PRIMARY OSTEOARTHRITIS OF RIGHT KNEE: ICD-10-CM

## 2022-02-10 DIAGNOSIS — E66.01 MORBID OBESITY: Primary | ICD-10-CM

## 2022-02-10 PROCEDURE — 99999 PR PBB SHADOW E&M-EST. PATIENT-LVL V: ICD-10-PCS | Mod: PBBFAC,,, | Performed by: SURGERY

## 2022-02-10 PROCEDURE — 99205 OFFICE O/P NEW HI 60 MIN: CPT | Mod: S$PBB,,, | Performed by: SURGERY

## 2022-02-10 PROCEDURE — 99205 PR OFFICE/OUTPT VISIT, NEW, LEVL V, 60-74 MIN: ICD-10-PCS | Mod: S$PBB,,, | Performed by: SURGERY

## 2022-02-10 PROCEDURE — 99215 OFFICE O/P EST HI 40 MIN: CPT | Mod: PBBFAC,PN | Performed by: SURGERY

## 2022-02-10 PROCEDURE — 99999 PR PBB SHADOW E&M-EST. PATIENT-LVL V: CPT | Mod: PBBFAC,,, | Performed by: SURGERY

## 2022-02-10 NOTE — PROGRESS NOTES
Initial Consult    Chief Complaint   Patient presents with    Obesity       History of Present Illness:  Patient is a 56 y.o. female who is referred for evaluation of surgical treatment of morbid obesity. Her Body mass index is 42.73 kg/m². She has known comorbidities of osteoarthritis. She has not attended the bariatric seminar and is most interested in gastric bypass surgery.     Hx of gastric sleeve at [facility]- 2017  Started at 220   Got as low as 190      Past attempts at weight loss include: Unsupervised: calorie counting, high protein, gym membership, cut fats;  Supervised:  Diet pills from MD, nutri-system;  Diet pills: amphetamines, phentermine;  Exercise attempts: walking or running    Weight history:   At current weight:  1 year  Obese for 40 years.  More than 35 pounds overweight for 40.  More than 100 pounds overweight for 23.  Started dieting at 11 years old.  Maximum weight reached: 341 pounds  Most weight lost was 130 pounds through cutting out fat and walking for 1 years.  She describes Her eating habits as snacker/grazer, emotional eater.      Review of patient's allergies indicates:   Allergen Reactions    Pcn [penicillins] Other (See Comments)     Doesn't know reaction       Current Outpatient Medications   Medication Sig Dispense Refill    albuterol (VENTOLIN HFA) 90 mcg/actuation inhaler INHALE TWO PUFFS BY MOUTH EVERY 6 HOURS AS NEEDED 18 g 0    ergocalciferol (ERGOCALCIFEROL) 50,000 unit Cap TAKE ONE CAPSULE BY MOUTH EVERY 7 DAYS 12 capsule 0    FLUARIX QUAD 9283-3459, PF, 60 mcg (15 mcg x 4)/0.5 mL Syrg ADM 0.5ML IM UTD  0    fluorouraciL (EFUDEX) 5 % cream APPLY TO THE AFFECTED AREA(S) of arm DAILY FOR TWO WEEKS      HYDROcodone-acetaminophen (NORCO) 5-325 mg per tablet Take 1 tablet by mouth every 6 (six) hours as needed for Pain. The medication you have been prescribed may cause drowsiness and impair your judgement.  Therefore, you should avoid driving, climbing, using  machinery, etc., so as not to increase your risk of injury.  Do NOT drink any alcohol while on this medication(s). It is also addictive. 10 tablet 0    meloxicam (MOBIC) 15 MG tablet TAKE ONE TABLET BY MOUTH ONCE DAILY 30 tablet 1    traZODone (DESYREL) 50 MG tablet Take 1 tablet (50 mg total) by mouth every evening. 90 tablet 0    diclofenac (VOLTAREN) 75 MG EC tablet Take 1 tablet (75 mg total) by mouth 2 (two) times daily. 60 tablet 1    pumpkin seed extract/soy germ (AZO BLADDER CONTROL ORAL) Take by mouth.       No current facility-administered medications for this visit.       Past Medical History:   Diagnosis Date    Arthritis     BMI 37.0-37.9, adult     Fibromyalgia     Obesity      Past Surgical History:   Procedure Laterality Date    ADENOIDECTOMY      APPENDECTOMY       SECTION      CHOLECYSTECTOMY      DILATION AND CURETTAGE OF UTERUS      GASTRECTOMY      LAPAROSCOPIC CHOLECYSTECTOMY WITH CHOLANGIOGRAPHY  2019    LAPAROSCOPIC CHOLECYSTECTOMY WITH CHOLANGIOGRAPHY N/A 2019    Procedure: CHOLECYSTECTOMY, LAPAROSCOPIC, WITH CHOLANGIOGRAM;  Surgeon: Eladio Dawson MD;  Location: [facility];  Service: General;  Laterality: N/A;    TONSILLECTOMY       Family History   Problem Relation Age of Onset    Heart disease Mother     Arthritis Mother         RHEUMATOID    Obesity Mother     Heart disease Father     COPD Father     Arthritis Father         RHEUMATOID    Obesity Sister      Social History     Tobacco Use    Smoking status: Never Smoker    Smokeless tobacco: Never Used   Substance Use Topics    Alcohol use: No    Drug use: No        Lab Results   Component Value Date    TSH 1.76 2020     Lab Results   Component Value Date    HGBA1C 4.8 2017     Lab Results   Component Value Date    CHOL 191 2022    CHOL 196 2021    CHOL 207 (H) 2020     Lab Results   Component Value Date    HDL 69 2022    HDL 82 (H) 2021     "HDL 80 09/19/2020     Lab Results   Component Value Date    LDLCALC 102.4 02/09/2022    LDLCALC 101.6 09/21/2021    LDLCALC 106 (H) 09/19/2020     Lab Results   Component Value Date    TRIG 98 02/09/2022    TRIG 62 09/21/2021    TRIG 116 09/19/2020     Lab Results   Component Value Date    CHOLHDL 36.1 02/09/2022    CHOLHDL 41.8 09/21/2021    CHOLHDL 2.6 09/19/2020         Review of Systems   Constitutional: Positive for activity change and fatigue.   Respiratory: Positive for cough.    Cardiovascular: Positive for leg swelling.   Gastrointestinal: Positive for constipation.   Musculoskeletal: Positive for joint swelling.   All other systems reviewed and are negative.      Physical:     Vital Signs (Most Recent)  Temp: 98 °F (36.7 °C) (02/10/22 1332)  Pulse: 75 (02/10/22 1332)  Resp: 16 (02/10/22 1332)  BP: (!) 163/82 (02/10/22 1332)  5' 6.5" (1.689 m)  121.9 kg (268 lb 11.9 oz)     Body comp:  Fat Percent:  50.1 %  Fat Mass:  133 lb  FFM:  132.4 lb  TBW: 97.2 lb  TBW %:  36.5 %  BMR: 1891 kcal          Physical Exam:  Physical Exam  Vitals and nursing note reviewed.   Constitutional:       General: She is not in acute distress.     Appearance: She is well-developed and well-nourished. She is not diaphoretic.   HENT:      Head: Normocephalic and atraumatic.      Mouth/Throat:      Pharynx: No oropharyngeal exudate.   Eyes:      General: No scleral icterus.     Extraocular Movements: EOM normal.      Conjunctiva/sclera: Conjunctivae normal.      Pupils: Pupils are equal, round, and reactive to light.   Neck:      Thyroid: No thyromegaly.      Vascular: No JVD.      Trachea: No tracheal deviation.   Cardiovascular:      Rate and Rhythm: Normal rate and regular rhythm.      Heart sounds: Normal heart sounds. No murmur heard.  No friction rub. No gallop.    Pulmonary:      Effort: Pulmonary effort is normal. No respiratory distress.      Breath sounds: Normal breath sounds. No stridor. No wheezing or rales.   Chest:     "  Chest wall: No tenderness.   Abdominal:      General: Bowel sounds are normal. There is no distension.      Palpations: Abdomen is soft. There is no mass.      Tenderness: There is no abdominal tenderness. There is no guarding or rebound.   Musculoskeletal:         General: Edema present. No tenderness. Normal range of motion.      Cervical back: Normal range of motion and neck supple.      Comments: Right knee tenderness   Lymphadenopathy:      Cervical: No cervical adenopathy.   Skin:     General: Skin is warm and dry.      Findings: No erythema or rash.   Neurological:      Mental Status: She is alert and oriented to person, place, and time.      Cranial Nerves: No cranial nerve deficit.   Psychiatric:         Mood and Affect: Mood and affect normal.         Behavior: Behavior normal.         ASSESSMENT/PLAN:        1. Morbid obesity  Ambulatory referral/consult to Nutrition Services    FL Upper GI    EKG 12-lead    Ambulatory referral/consult to Psychiatry    Ambulatory referral/consult to Cardiology   2. Morbid obesity with BMI of 40.0-44.9, adult     3. Primary osteoarthritis of right knee         Plan:  Nadia Saba has morbid obesity as their Body mass index is 42.73 kg/m². She would benefit from weight loss surgery and has chosen gastric bypass surgery as the preferred procedure. She understands that this is a tool and lifestyle change will be necessary to keep weight off. I went over possible complications of all surgeries with the patient and she is agreeable to surgery.    She will need:    Labs  EKG  UGI   dietary consult  psych consult   Seminar      I will obtain the following clearances prior to surgery: cardiology     She has worsening medical problems such as obesity and arthritis.  We are planning to treat these with diet, exercise and possibly elective major surgery.  She has risk factors for elective major surgery which include obesity.      Diet plan: high protein low carb- mainly meats and  vegetables  Exercise plan: Cardiovascular exercise, get HR over 100 for 20 minutes 3 times per week.  Start multivitamin

## 2022-02-18 ENCOUNTER — HOSPITAL ENCOUNTER (OUTPATIENT)
Dept: RADIOLOGY | Facility: CLINIC | Age: 57
Discharge: HOME | End: 2022-02-18
Attending: PHYSICAL MEDICINE & REHABILITATION
Payer: COMMERCIAL

## 2022-02-18 DIAGNOSIS — Z12.31 ENCOUNTER FOR SCREENING MAMMOGRAM FOR MALIGNANT NEOPLASM OF BREAST: ICD-10-CM

## 2022-02-18 PROCEDURE — 77067 MAMMO DIGITAL SCREENING BILAT WITH TOMO: ICD-10-PCS | Mod: 26,,, | Performed by: RADIOLOGY

## 2022-02-18 PROCEDURE — 77063 MAMMO DIGITAL SCREENING BILAT WITH TOMO: ICD-10-PCS | Mod: 26,,, | Performed by: RADIOLOGY

## 2022-02-18 PROCEDURE — 77067 SCR MAMMO BI INCL CAD: CPT | Mod: 26,,, | Performed by: RADIOLOGY

## 2022-02-18 PROCEDURE — 77067 SCR MAMMO BI INCL CAD: CPT | Mod: TC,PO

## 2022-02-18 PROCEDURE — 77063 BREAST TOMOSYNTHESIS BI: CPT | Mod: TC,PO

## 2022-02-18 PROCEDURE — 77063 BREAST TOMOSYNTHESIS BI: CPT | Mod: 26,,, | Performed by: RADIOLOGY

## 2022-02-24 ENCOUNTER — CLINICAL SUPPORT (OUTPATIENT)
Dept: BARIATRICS | Facility: CLINIC | Age: 57
End: 2022-02-24
Payer: COMMERCIAL

## 2022-02-24 VITALS — WEIGHT: 257.25 LBS | HEIGHT: 66 IN | BODY MASS INDEX: 41.34 KG/M2

## 2022-02-24 DIAGNOSIS — E66.01 MORBID OBESITY: ICD-10-CM

## 2022-02-24 DIAGNOSIS — Z71.3 DIETARY COUNSELING AND SURVEILLANCE: ICD-10-CM

## 2022-02-24 DIAGNOSIS — Z71.3 PRE-BARIATRIC SURGERY NUTRITION EVALUATION: ICD-10-CM

## 2022-02-24 PROCEDURE — 97802 MEDICAL NUTRITION INDIV IN: CPT | Mod: S$GLB,,, | Performed by: DIETITIAN, REGISTERED

## 2022-02-24 PROCEDURE — 97802 PR MED NUTR THER, 1ST, INDIV, EA 15 MIN: ICD-10-PCS | Mod: S$GLB,,, | Performed by: DIETITIAN, REGISTERED

## 2022-02-24 PROCEDURE — 99999 PR PBB SHADOW E&M-EST. PATIENT-LVL III: ICD-10-PCS | Mod: PBBFAC,,, | Performed by: DIETITIAN, REGISTERED

## 2022-02-24 PROCEDURE — 99999 PR PBB SHADOW E&M-EST. PATIENT-LVL III: CPT | Mod: PBBFAC,,, | Performed by: DIETITIAN, REGISTERED

## 2022-02-25 NOTE — PROGRESS NOTES
NUTRITIONAL CONSULT    Referring Physician: Dr. Bonner  Reason for MNT Referral: Initial assessment for laparoscopic Giovanny-en-Y work-up    PAST MEDICAL HISTORY:   56 y.o. female presents with a BMI of Body mass index is 41.53 kg/m²..  Past attempts at weight loss include: Unsupervised: calorie counting, high protein, gym membership, cut fats;  Supervised:  Diet pills from MD, nutri-system;  Diet pills: amphetamines, phentermine;  Exercise attempts: walking or running     Weight history:   At current weight:  1 year  Obese for 40 years.  More than 35 pounds overweight for 40.  More than 100 pounds overweight for 25.  Started dieting at 11 years old.  Maximum weight reached: 341 pounds  Most weight lost was 130 pounds through cutting out fat and walking for 1 years.  She describes Her eating habits as snacker/grazer, emotional eater.       Past Medical History:   Diagnosis Date    Arthritis     BMI 37.0-37.9, adult     Fibromyalgia     Obesity        CLINICAL DATA:  56 y.o.-year-old White female.  Height: 5'6  Weight: 257 lbs  IBW: 144 lbs  BMI: 41.5  The patient's goal weight (75 % EBW): 172 lbs    Goal for Bariatric Surgery: to lose weight    NUTRITION & HEALTH HISTORY:  Greatest challenge: snacker/grazer, emotional eater, sodas    Current diet recall: Breakfast: Premier   Midmorning: cheese stick  Lunch: Triple Zero greek yogurt  Dinner: 5-6 shrimp or small fish + vegetable    Current Diet:  Meal pattern: imrpoved  Protein supplements: 1  Snacking: adequate healthy choices / day  Vegetables: Likes a variety. Eats daily.  Fruits: Likes a variety. Eats once per week.  Beverages: sugar-free beverages. 6-8 bottles of water daily flavored with Propel or Crystal Lite  Dining out: Reduced lately.  Cooking at home: Daily. Mostly baked and grilled meat, fish and vegetables.    Exercise:  Past exercise: None    Current exercise: None  Restrictions to exercise: knee    Vitamins / Minerals / Herbs:   One a Day Women's,  50,000 IU vitamin D.    Food Allergies:   NKFA  Intolerant of scrambled eggs.      Social:  Grocery shopping and food prep self.  Alcohol: Socially.  Smoking: None.    ASSESSMENT:  · Patient reports attempts at weight loss, only to regain lost weight.  · Patient demonstrated knowledge of healthy eating behaviors and exercise patterns; admits to not eating healthy and not exercising at this point.  · Patient demonstrates willingness to change lifestyle and make behavior modifications.  · Expect fair  compliance after surgery at this time.  · Patient reported emergency gallbladder removal surgery.  · She reports she has stopped sodas.    Insurance requires medically supervised diet prior to consideration for bariatric surgery.    BARIATRIC DIET DISCUSSION:  Discussed diet after surgery and related to patients food record.  Reviewed diet progression before and after surgery.  Stressed importance of exercise and its role in achieving weight loss goals.  Answered all questions.    RECOMMENDATIONS:  Patient is a potential candidate for bariatric surgery.    Needs additional visit(s) with RD.    PLAN:  Resume work-up for surgery.  Continue to review Bariatric Nutrition Guidebook at home and call with any questions.  Work on Bariatric Nutrition Checklist.  Increase exercise.    SESSION TIME:  60 minutes

## 2022-03-22 ENCOUNTER — CLINICAL SUPPORT (OUTPATIENT)
Dept: BARIATRICS | Facility: CLINIC | Age: 57
End: 2022-03-22
Payer: COMMERCIAL

## 2022-03-22 VITALS — BODY MASS INDEX: 40.84 KG/M2 | HEIGHT: 66 IN

## 2022-03-22 DIAGNOSIS — E66.9 OBESITY, CLASS II, BMI 35-39.9: ICD-10-CM

## 2022-03-22 PROCEDURE — 99999 PR PBB SHADOW E&M-EST. PATIENT-LVL III: CPT | Mod: PBBFAC,,, | Performed by: DIETITIAN, REGISTERED

## 2022-03-22 PROCEDURE — 97803 MED NUTRITION INDIV SUBSEQ: CPT | Mod: S$GLB,,, | Performed by: DIETITIAN, REGISTERED

## 2022-03-22 PROCEDURE — 97803 PR MED NUTR THER, SUBSQ, INDIV, EA 15 MIN: ICD-10-PCS | Mod: S$GLB,,, | Performed by: DIETITIAN, REGISTERED

## 2022-03-22 PROCEDURE — 99999 PR PBB SHADOW E&M-EST. PATIENT-LVL III: ICD-10-PCS | Mod: PBBFAC,,, | Performed by: DIETITIAN, REGISTERED

## 2022-03-22 NOTE — PROGRESS NOTES
NUTRITION NOTE    Referring Physician: Dr. Bonner  Reason for MNT Referral: Medically Supervised Diet pending Gastric Bypass    PAST MEDICAL HISTORY:    Patient presents for 2nd visit for MSD with weight loss over the past month; total weight loss by making numerous dietary and lifestyle changes.    Past Medical History:   Diagnosis Date    Arthritis     BMI 37.0-37.9, adult     Fibromyalgia     Obesity        CLINICAL DATA:  56 y.o. female.    There were no vitals filed for this visit.    Current Weight: 246 lbs  Weight Change Since Initial Visit: -22 lbs  Ideal Body Weight: 144 lbs  BMI: 39.73    CURRENT DIET:  Reduced-calorie diet.  Diet Recall: Breakfast: protein shake or two scrambled eggs  Lunch: tuna + boiled egg, grilled shrimp, grilled fish  Dinner: protein + vegetable  Snacks: 1-2 greek yogurts, sugar free jello, orange  Meal Pattern: Improved.  Protein Supplements: 0-1 per day.  Snacking: Adequate. Snacks include healthy choices.    Vegetables: Likes a variety. Eats daily.  Fruits: Likes a variety. Eats once a week.  Beverages: 6-8 bottles of water, Crystal Lite  Dining Out: Dining out:2-3x Monthly. Mostly restaurants and take-out.  Cooking at home: Daily. Mostly baked and grilled meat, fish and vegetables.    CURRENT EXERCISE:  Fair. Walking, riding bike.    Vitamins / Minerals / Herbs:   Bariatric MVI with iron, 600 mg calcium citrate + D3.    Food Allergies:   NKFA  Cannot tolerate scrambled eggs    Social:  Works regular daytime shifts.  Alcohol: Socially.  Smoking: None.    ASSESSMENT:  Patient demonstrates all willingness to change lifestyle habits as evidenced by weight loss, including protein drinks, increased vegetables, healthier cooking at home, healthier snacking at work and healthier snacking at home.    Doing well with working on greatest challenges snacking/grazing, stress eating.    Adequate calorie intake.  Adequate protein intake.    PLAN:    Diet: Maintain diet plan.    Exercise:  Increase.    Behavior Modification: 30 minutes exercise at least 3 times a week.    Weight loss prior to surgery (5-10% TBW): -22 lbs    Return to clinic in one month.    SESSION TIME:  45 minutes

## 2022-03-31 ENCOUNTER — HOSPITAL ENCOUNTER (OUTPATIENT)
Dept: RADIOLOGY | Facility: HOSPITAL | Age: 57
Discharge: HOME | End: 2022-03-31
Attending: SURGERY
Payer: COMMERCIAL

## 2022-03-31 DIAGNOSIS — E66.01 MORBID OBESITY: ICD-10-CM

## 2022-03-31 PROCEDURE — A9698 NON-RAD CONTRAST MATERIALNOC: HCPCS | Performed by: SURGERY

## 2022-03-31 PROCEDURE — 74240 X-RAY XM UPR GI TRC 1CNTRST: CPT | Mod: TC,FY

## 2022-03-31 PROCEDURE — 74240 FL UPPER GI: ICD-10-PCS | Mod: 26,,, | Performed by: RADIOLOGY

## 2022-03-31 PROCEDURE — 25500020 PHARM REV CODE 255: Performed by: SURGERY

## 2022-03-31 PROCEDURE — 74240 X-RAY XM UPR GI TRC 1CNTRST: CPT | Mod: 26,,, | Performed by: RADIOLOGY

## 2022-03-31 RX ADMIN — BARIUM SULFATE 200 ML: 0.6 SUSPENSION ORAL at 09:03

## 2022-04-11 ENCOUNTER — OFFICE VISIT (OUTPATIENT)
Dept: CARDIOLOGY | Facility: CLINIC | Age: 57
End: 2022-04-11
Payer: COMMERCIAL

## 2022-04-11 VITALS
HEART RATE: 74 BPM | BODY MASS INDEX: 39.29 KG/M2 | OXYGEN SATURATION: 100 % | WEIGHT: 244.5 LBS | SYSTOLIC BLOOD PRESSURE: 126 MMHG | HEIGHT: 66 IN | DIASTOLIC BLOOD PRESSURE: 61 MMHG

## 2022-04-11 DIAGNOSIS — Z01.810 PRE-PROCEDURAL CARDIOVASCULAR EXAMINATION: ICD-10-CM

## 2022-04-11 DIAGNOSIS — E66.01 MORBID OBESITY: ICD-10-CM

## 2022-04-11 PROCEDURE — 1159F PR MEDICATION LIST DOCUMENTED IN MEDICAL RECORD: ICD-10-PCS | Mod: CPTII,S$GLB,, | Performed by: NURSE PRACTITIONER

## 2022-04-11 PROCEDURE — 99999 PR PBB SHADOW E&M-EST. PATIENT-LVL IV: ICD-10-PCS | Mod: PBBFAC,,, | Performed by: NURSE PRACTITIONER

## 2022-04-11 PROCEDURE — 3008F PR BODY MASS INDEX (BMI) DOCUMENTED: ICD-10-PCS | Mod: CPTII,S$GLB,, | Performed by: NURSE PRACTITIONER

## 2022-04-11 PROCEDURE — 3008F BODY MASS INDEX DOCD: CPT | Mod: CPTII,S$GLB,, | Performed by: NURSE PRACTITIONER

## 2022-04-11 PROCEDURE — 1159F MED LIST DOCD IN RCRD: CPT | Mod: CPTII,S$GLB,, | Performed by: NURSE PRACTITIONER

## 2022-04-11 PROCEDURE — 99203 OFFICE O/P NEW LOW 30 MIN: CPT | Mod: S$GLB,,, | Performed by: NURSE PRACTITIONER

## 2022-04-11 PROCEDURE — 3074F PR MOST RECENT SYSTOLIC BLOOD PRESSURE < 130 MM HG: ICD-10-PCS | Mod: CPTII,S$GLB,, | Performed by: NURSE PRACTITIONER

## 2022-04-11 PROCEDURE — 3078F DIAST BP <80 MM HG: CPT | Mod: CPTII,S$GLB,, | Performed by: NURSE PRACTITIONER

## 2022-04-11 PROCEDURE — 99999 PR PBB SHADOW E&M-EST. PATIENT-LVL IV: CPT | Mod: PBBFAC,,, | Performed by: NURSE PRACTITIONER

## 2022-04-11 PROCEDURE — 1160F RVW MEDS BY RX/DR IN RCRD: CPT | Mod: CPTII,S$GLB,, | Performed by: NURSE PRACTITIONER

## 2022-04-11 PROCEDURE — 3078F PR MOST RECENT DIASTOLIC BLOOD PRESSURE < 80 MM HG: ICD-10-PCS | Mod: CPTII,S$GLB,, | Performed by: NURSE PRACTITIONER

## 2022-04-11 PROCEDURE — 1160F PR REVIEW ALL MEDS BY PRESCRIBER/CLIN PHARMACIST DOCUMENTED: ICD-10-PCS | Mod: CPTII,S$GLB,, | Performed by: NURSE PRACTITIONER

## 2022-04-11 PROCEDURE — 3074F SYST BP LT 130 MM HG: CPT | Mod: CPTII,S$GLB,, | Performed by: NURSE PRACTITIONER

## 2022-04-11 PROCEDURE — 99203 PR OFFICE/OUTPT VISIT, NEW, LEVL III, 30-44 MIN: ICD-10-PCS | Mod: S$GLB,,, | Performed by: NURSE PRACTITIONER

## 2022-04-11 NOTE — PROGRESS NOTES
Cardiology    4/11/2022  7:48 AM    Problem list  Patient Active Problem List   Diagnosis    Primary fibromyalgia syndrome    Anemia    Constipation    Depression    S/P Hysteroscopic Polypectomy and D&C    Class 1 obesity due to excess calories without serious comorbidity with body mass index (BMI) of 32.0 to 32.9 in adult    Insomnia    Other headache syndrome    Vitamin D deficiency    Osteoarthritis of right knee       CC:  Pre-procedural cardiac evaluation    HPI:    Planning on bariatric surgery, Using [facility] in [place]  Mother had MI at age 51, dad had orthostatic hypotn, mom had HTN  No tobacco  Rare etoh  Caffeine: rarely  No DM, renal or liver dx  Has arthitis and fibromyalgia  No probs with anesthesia  She denies chest pain or shortness of breath.    She has swelling in her legs occasionally when she sits for extended periods          Medications  Current Outpatient Medications   Medication Sig Dispense Refill    albuterol (VENTOLIN HFA) 90 mcg/actuation inhaler INHALE TWO PUFFS BY MOUTH EVERY 6 HOURS AS NEEDED 18 g 0    ergocalciferol (ERGOCALCIFEROL) 50,000 unit Cap TAKE ONE CAPSULE BY MOUTH EVERY 7 DAYS 12 capsule 0    FLUARIX QUAD 5619-7213, PF, 60 mcg (15 mcg x 4)/0.5 mL Syrg ADM 0.5ML IM UTD  0    gabapentin (NEURONTIN) 100 MG capsule Take 1 capsule (100 mg total) by mouth every evening. 90 capsule 0    meloxicam (MOBIC) 15 MG tablet Take 1 tablet (15 mg total) by mouth once daily. 30 tablet 2    traZODone (DESYREL) 50 MG tablet Take 1 tablet (50 mg total) by mouth every evening. 90 tablet 0     No current facility-administered medications for this visit.      Prior to Admission medications    Medication Sig Start Date End Date Taking? Authorizing Provider   albuterol (VENTOLIN HFA) 90 mcg/actuation inhaler INHALE TWO PUFFS BY MOUTH EVERY 6 HOURS AS NEEDED 12/13/21  Yes Antonio Padilla MD   ergocalciferol (ERGOCALCIFEROL) 50,000 unit Cap TAKE ONE CAPSULE BY MOUTH EVERY  7 DAYS 21  Yes Antonio Padilla MD   FLUARIX QUAD [id number], PF, 60 mcg (15 mcg x 4)/0.5 mL Syrg ADM 0.5ML IM UTD 10/17/19  Yes Historical Provider   gabapentin (NEURONTIN) 100 MG capsule Take 1 capsule (100 mg total) by mouth every evening. 3/7/22 3/7/23 Yes Antonio Padilla MD   meloxicam (MOBIC) 15 MG tablet Take 1 tablet (15 mg total) by mouth once daily. 3/7/22  Yes Antonio Padilla MD   traZODone (DESYREL) 50 MG tablet Take 1 tablet (50 mg total) by mouth every evening. 3/7/22  Yes Antonio Padilla MD   fluorouraciL (EFUDEX) 5 % cream APPLY TO THE AFFECTED AREA(S) of arm DAILY FOR TWO WEEKS 10/28/21 4/11/22  Historical Provider   HYDROcodone-acetaminophen (NORCO) 5-325 mg per tablet Take 1 tablet by mouth every 6 (six) hours as needed for Pain. The medication you have been prescribed may cause drowsiness and impair your judgement.  Therefore, you should avoid driving, climbing, using machinery, etc., so as not to increase your risk of injury.  Do NOT drink any alcohol while on this medication(s). It is also addictive. 22  Anca Hastnigs PA-C         History  Past Medical History:   Diagnosis Date    Arthritis     BMI 37.0-37.9, adult     Fibromyalgia     Obesity      Past Surgical History:   Procedure Laterality Date    ADENOIDECTOMY      APPENDECTOMY       SECTION      CHOLECYSTECTOMY      DILATION AND CURETTAGE OF UTERUS      GASTRECTOMY  2017    LAPAROSCOPIC CHOLECYSTECTOMY WITH CHOLANGIOGRAPHY  2019    LAPAROSCOPIC CHOLECYSTECTOMY WITH CHOLANGIOGRAPHY N/A 2019    Procedure: CHOLECYSTECTOMY, LAPAROSCOPIC, WITH CHOLANGIOGRAM;  Surgeon: Eladio Dawson MD;  Location: [facility];  Service: General;  Laterality: N/A;    TONSILLECTOMY       Social History     Socioeconomic History    Marital status:    Occupational History    Occupation: clerical     Employer: "[employer]   Tobacco Use    Smoking status: Never Smoker     Smokeless tobacco: Never Used   Substance and Sexual Activity    Alcohol use: No    Drug use: No    Sexual activity: Yes     Partners: Male         Allergies  Review of patient's allergies indicates:   Allergen Reactions    Pcn [penicillins] Other (See Comments)     Doesn't know reaction         Review of Systems   Review of Systems   Constitutional: Negative for diaphoresis and malaise/fatigue.   HENT: Negative.    Cardiovascular: Positive for leg swelling. Negative for chest pain, claudication, dyspnea on exertion, irregular heartbeat, near-syncope, orthopnea, palpitations, paroxysmal nocturnal dyspnea and syncope.   Respiratory: Negative for shortness of breath.    Endocrine: Negative for polydipsia, polyphagia and polyuria.   Hematologic/Lymphatic: Does not bruise/bleed easily.   Gastrointestinal: Negative for bloating, nausea and vomiting.   Genitourinary: Negative.    Neurological: Negative for excessive daytime sleepiness, dizziness, light-headedness, loss of balance and weakness.   Psychiatric/Behavioral: The patient is not nervous/anxious.    Allergic/Immunologic: Negative.          Physical Exam  Wt Readings from Last 1 Encounters:   04/11/22 110.9 kg (244 lb 7.8 oz)     BP Readings from Last 3 Encounters:   04/11/22 126/61   03/07/22 103/68   02/24/22 115/74     Pulse Readings from Last 1 Encounters:   04/11/22 74     Body mass index is 39.46 kg/m².    Physical Exam  Vitals and nursing note reviewed.   Constitutional:       Appearance: Normal appearance.   HENT:      Head: Normocephalic and atraumatic.      Mouth/Throat:      Mouth: Mucous membranes are moist.   Eyes:      Pupils: Pupils are equal, round, and reactive to light.   Cardiovascular:      Rate and Rhythm: Normal rate and regular rhythm.      Pulses:           Radial pulses are 2+ on the right side and 2+ on the left side.        Dorsalis pedis pulses are 2+ on the right side and 2+ on the left side.        Posterior tibial pulses are 2+ on  the right side and 2+ on the left side.      Heart sounds: No murmur heard.  Pulmonary:      Effort: Pulmonary effort is normal. No respiratory distress.      Breath sounds: Normal breath sounds.   Abdominal:      General: There is no distension.      Tenderness: There is no abdominal tenderness.   Musculoskeletal:      Cervical back: Normal range of motion.      Right lower leg: No edema.      Left lower leg: No edema.   Skin:     General: Skin is warm and dry.      Findings: No erythema.   Neurological:      General: No focal deficit present.      Mental Status: She is alert.   Psychiatric:         Mood and Affect: Mood normal.         Behavior: Behavior normal.         Problem List Items Addressed This Visit        Cardiac/Vascular    Pre-procedural cardiovascular examination    Overview     Recommend stress EKG and echo to assess for any blockages or structural abnormality that may affect her candidacy for bypass surgery  Await results             Other Visit Diagnoses     Morbid obesity        Relevant Orders    Echo    Exercise Stress - EKG                    Follow Up    PRN, can set up virtual to review    @[email or web address] [email or web address] DNP

## 2022-04-20 ENCOUNTER — OFFICE VISIT (OUTPATIENT)
Dept: BARIATRICS | Facility: CLINIC | Age: 57
End: 2022-04-20
Payer: COMMERCIAL

## 2022-04-20 VITALS
BODY MASS INDEX: 36.79 KG/M2 | WEIGHT: 234.38 LBS | DIASTOLIC BLOOD PRESSURE: 65 MMHG | SYSTOLIC BLOOD PRESSURE: 131 MMHG | HEART RATE: 76 BPM | HEIGHT: 67 IN | TEMPERATURE: 98 F | RESPIRATION RATE: 16 BRPM

## 2022-04-20 DIAGNOSIS — E66.01 MORBID OBESITY: Primary | ICD-10-CM

## 2022-04-20 DIAGNOSIS — E66.9 OBESITY, CLASS II, BMI 35-39.9: ICD-10-CM

## 2022-04-20 PROCEDURE — 99213 OFFICE O/P EST LOW 20 MIN: CPT | Mod: S$GLB,,, | Performed by: SURGERY

## 2022-04-20 PROCEDURE — 3008F BODY MASS INDEX DOCD: CPT | Mod: CPTII,S$GLB,, | Performed by: SURGERY

## 2022-04-20 PROCEDURE — 1159F MED LIST DOCD IN RCRD: CPT | Mod: CPTII,S$GLB,, | Performed by: SURGERY

## 2022-04-20 PROCEDURE — 3008F PR BODY MASS INDEX (BMI) DOCUMENTED: ICD-10-PCS | Mod: CPTII,S$GLB,, | Performed by: SURGERY

## 2022-04-20 PROCEDURE — 3078F PR MOST RECENT DIASTOLIC BLOOD PRESSURE < 80 MM HG: ICD-10-PCS | Mod: CPTII,S$GLB,, | Performed by: SURGERY

## 2022-04-20 PROCEDURE — 1159F PR MEDICATION LIST DOCUMENTED IN MEDICAL RECORD: ICD-10-PCS | Mod: CPTII,S$GLB,, | Performed by: SURGERY

## 2022-04-20 PROCEDURE — 3075F PR MOST RECENT SYSTOLIC BLOOD PRESS GE 130-139MM HG: ICD-10-PCS | Mod: CPTII,S$GLB,, | Performed by: SURGERY

## 2022-04-20 PROCEDURE — 99213 PR OFFICE/OUTPT VISIT, EST, LEVL III, 20-29 MIN: ICD-10-PCS | Mod: S$GLB,,, | Performed by: SURGERY

## 2022-04-20 PROCEDURE — 99999 PR PBB SHADOW E&M-EST. PATIENT-LVL III: CPT | Mod: PBBFAC,,, | Performed by: SURGERY

## 2022-04-20 PROCEDURE — 99999 PR PBB SHADOW E&M-EST. PATIENT-LVL III: ICD-10-PCS | Mod: PBBFAC,,, | Performed by: SURGERY

## 2022-04-20 PROCEDURE — 3044F PR MOST RECENT HEMOGLOBIN A1C LEVEL <7.0%: ICD-10-PCS | Mod: CPTII,S$GLB,, | Performed by: SURGERY

## 2022-04-20 PROCEDURE — 3078F DIAST BP <80 MM HG: CPT | Mod: CPTII,S$GLB,, | Performed by: SURGERY

## 2022-04-20 PROCEDURE — 3044F HG A1C LEVEL LT 7.0%: CPT | Mod: CPTII,S$GLB,, | Performed by: SURGERY

## 2022-04-20 PROCEDURE — 3075F SYST BP GE 130 - 139MM HG: CPT | Mod: CPTII,S$GLB,, | Performed by: SURGERY

## 2022-04-20 NOTE — PROGRESS NOTES
Medically Supervised Weight Loss Documentation    Date of Visit: 04/20/2022    Patient: Nadia Saba    Current Weight: 235  Current BMI: Body mass index is 37.27 kg/m².  Weight Change: - 33 pounds    Last Weight: 268    Beginning Weight: 268      Vitals:   Vitals:    04/20/22 0806   BP: 131/65   Pulse: 76   Resp: 16   Temp: 97.8 °F (36.6 °C)       Comorbidities:   Past Medical History:   Diagnosis Date    Arthritis     BMI 37.0-37.9, adult     Fibromyalgia     Obesity        Medications:  Current Outpatient Medications on File Prior to Visit   Medication Sig Dispense Refill    albuterol (VENTOLIN HFA) 90 mcg/actuation inhaler INHALE TWO PUFFS BY MOUTH EVERY 6 HOURS AS NEEDED 18 g 0    ergocalciferol (ERGOCALCIFEROL) 50,000 unit Cap TAKE ONE CAPSULE BY MOUTH EVERY 7 DAYS 12 capsule 0    FLUARIX QUAD 6054-8428, PF, 60 mcg (15 mcg x 4)/0.5 mL Syrg ADM 0.5ML IM UTD  0    gabapentin (NEURONTIN) 100 MG capsule Take 1 capsule (100 mg total) by mouth every evening. 90 capsule 0    meloxicam (MOBIC) 15 MG tablet Take 1 tablet (15 mg total) by mouth once daily. 30 tablet 2    traZODone (DESYREL) 50 MG tablet Take 1 tablet (50 mg total) by mouth every evening. 90 tablet 0     No current facility-administered medications on file prior to visit.         Body comp:  Fat Percent:  49.9 %  Fat Mass:  117.4 lb  FFM:  118 lb  TBW: 85.6 lb  TBW %:  36.4 %  BMR: 1683 kcal      Diet Education Discussed:    Breakfast:  Protein shake  Lunch:  Meat and vegetable  Dinner:  Meat and vegetable    Exercise/Activity Discussed:    Walking 3 times per week     Behavior or Diet Goals for this patient:    Doing great  Continue low carb dieting  As much exercise as possible    Labs  EKG  UGI - Moderate size non sliding hiatal hernia.  1.7 cm duodenal diverticulum.  dietary consult- done  psych consult   Seminar        I will obtain the following clearances prior to surgery: cardiology   : total time spent for visit: 20 minutes

## 2022-05-24 ENCOUNTER — CLINICAL SUPPORT (OUTPATIENT)
Dept: BARIATRICS | Facility: CLINIC | Age: 57
End: 2022-05-24
Payer: COMMERCIAL

## 2022-05-24 VITALS — BODY MASS INDEX: 37.42 KG/M2 | WEIGHT: 235.31 LBS

## 2022-05-24 DIAGNOSIS — Z71.3 DIETARY COUNSELING AND SURVEILLANCE: ICD-10-CM

## 2022-05-24 DIAGNOSIS — Z71.3 PRE-BARIATRIC SURGERY NUTRITION EVALUATION: ICD-10-CM

## 2022-05-24 PROCEDURE — 99999 PR PBB SHADOW E&M-EST. PATIENT-LVL II: ICD-10-PCS | Mod: PBBFAC,,, | Performed by: DIETITIAN, REGISTERED

## 2022-05-24 PROCEDURE — 97803 PR MED NUTR THER, SUBSQ, INDIV, EA 15 MIN: ICD-10-PCS | Mod: S$GLB,,, | Performed by: DIETITIAN, REGISTERED

## 2022-05-24 PROCEDURE — 99999 PR PBB SHADOW E&M-EST. PATIENT-LVL II: CPT | Mod: PBBFAC,,, | Performed by: DIETITIAN, REGISTERED

## 2022-05-24 PROCEDURE — 97803 MED NUTRITION INDIV SUBSEQ: CPT | Mod: S$GLB,,, | Performed by: DIETITIAN, REGISTERED

## 2022-05-24 NOTE — PROGRESS NOTES
NUTRITION NOTE    Referring Physician: Dr. Bonner  Reason for MNT Referral: Medically Supervised Diet pending Gastric Bypass    PAST MEDICAL HISTORY:    Patient presents for 4th visit for MSD with weight at a standstill over the past month; total weight loss by making numerous dietary and lifestyle changes.    Past Medical History:   Diagnosis Date    Arthritis     BMI 37.0-37.9, adult     Fibromyalgia     Obesity        CLINICAL DATA:  56 y.o. female.  Height: 5'6.5  Current Weight: 235 lbs  Weight Change Since Initial Visit: -33 lbs  Ideal Body Weight: 144 lbs  BMI: 37.4    CURRENT DIET:  Diet Recall: Breakfast: Fairlife protein shake  Morning about 10 am: [employer]ikos greek yogurt  Lunch:2-3 turkey slices and string cheese, grilled chicken or fish  2 o'clock: sliced turkey and cheese or greek yogurt  Dinner: chicken, fish grilled shrimp + cauliflower or brocoli    No after dinner snacking.    Meal Pattern: Improved.  Protein Supplements: 1 per day.  Snacking: Adequate. Snacks include healthy choices.    Vegetables: Likes a variety. Eats daily.  Fruits: Likes a variety. Eats rarely.  Beverages:1 gallon of Crystal Lite  Dining Out: Dining out: Reduced lately.   Cooking at home: Daily. Mostly baked and grilled meat, fish and vegetables.    CURRENT EXERCISE:  Adequate. Walking 3 times a week    Vitamins / Minerals / Herbs:   Bariatric MVI with calcium citrate + D3, started a probiotic and this has helped her constipation.    Pt reported she has had anemia since she was pregnant with her first child.    Food Allergies:   NKFA    Social:  Works regular daytime shifts.  Alcohol: Socially.  Smoking: None.    ASSESSMENT:  Patient demonstrates all willingness to change lifestyle habits as evidenced by weight loss, good exercise, including protein drinks, increased vegetables and healthier cooking at home.    Doing well with working on greatest challenges of snacking/grazing and stress eating.    Adequate calorie  intake.  Inadequate protein intake.  RD believes she is not reaching .8 g/kg ABW.    PLAN:    Diet: Maintain diet plan.    Exercise: Increase.    Behavior Modification: Begin to document food & activity logs daily.  Weight protein portions to ensure reaching protein goal.    Weight loss prior to surgery (5-10% TBW): -33 lbs    Return to clinic in one month.    SESSION TIME:  30 minutes

## 2022-06-21 ENCOUNTER — HOSPITAL ENCOUNTER (OUTPATIENT)
Dept: RADIOLOGY | Facility: CLINIC | Age: 57
Discharge: HOME | End: 2022-06-21
Attending: PHYSICAL MEDICINE & REHABILITATION
Payer: COMMERCIAL

## 2022-06-21 DIAGNOSIS — Z13.820 SCREENING FOR OSTEOPOROSIS: ICD-10-CM

## 2022-06-21 PROCEDURE — 77080 DXA BONE DENSITY AXIAL: CPT | Mod: 26,,, | Performed by: RADIOLOGY

## 2022-06-21 PROCEDURE — 77080 DEXA BONE DENSITY SPINE HIP: ICD-10-PCS | Mod: 26,,, | Performed by: RADIOLOGY

## 2022-06-21 PROCEDURE — 77080 DXA BONE DENSITY AXIAL: CPT | Mod: TC,PO

## 2022-06-23 ENCOUNTER — CLINICAL SUPPORT (OUTPATIENT)
Dept: BARIATRICS | Facility: CLINIC | Age: 57
End: 2022-06-23
Payer: COMMERCIAL

## 2022-06-23 VITALS — WEIGHT: 230.94 LBS | BODY MASS INDEX: 37.11 KG/M2 | HEIGHT: 66 IN

## 2022-06-23 DIAGNOSIS — E66.9 OBESITY, CLASS II, BMI 35-39.9: ICD-10-CM

## 2022-06-23 DIAGNOSIS — Z71.3 DIETARY COUNSELING AND SURVEILLANCE: ICD-10-CM

## 2022-06-23 PROCEDURE — 97803 MED NUTRITION INDIV SUBSEQ: CPT | Mod: S$GLB,,, | Performed by: DIETITIAN, REGISTERED

## 2022-06-23 PROCEDURE — 99999 PR PBB SHADOW E&M-EST. PATIENT-LVL II: ICD-10-PCS | Mod: PBBFAC,,, | Performed by: DIETITIAN, REGISTERED

## 2022-06-23 PROCEDURE — 99999 PR PBB SHADOW E&M-EST. PATIENT-LVL II: CPT | Mod: PBBFAC,,, | Performed by: DIETITIAN, REGISTERED

## 2022-06-23 PROCEDURE — 97803 PR MED NUTR THER, SUBSQ, INDIV, EA 15 MIN: ICD-10-PCS | Mod: S$GLB,,, | Performed by: DIETITIAN, REGISTERED

## 2022-06-27 NOTE — PROGRESS NOTES
NUTRITION NOTE    Referring Physician: Dr. Bonner  Reason for MNT Referral: Medically Supervised Diet pending gastric sleeve to Gastric Bypass conversion.    PAST MEDICAL HISTORY:    Patient presents for 5th visit for MSD with weight loss over the past month; total weight loss by making numerous dietary and lifestyle changes.    Past Medical History:   Diagnosis Date    Arthritis     BMI 37.0-37.9, adult     Fibromyalgia     Obesity        CLINICAL DATA:  56 y.o. female.    There were no vitals filed for this visit.    Current Weight: 230 lbs  Weight Change Since Initial Visit: -38 lbs  Ideal Body Weight: 144 lbs  BMI: 37.27    CURRENT DIET:  Diet Recall: Breakfast: Fairlife protein shake  Midmorning: greek yogurt or a P3  Lunch: tuna or grilled fish, grilled shrimp  Midafternoon: greek yogurt or peanut butter  Dinner: fish, pork chop, hamburger, or chicken with broccoli or cauliflower    Diet Includes:   Meal Pattern: Improved. Pt made significant changes and stuck to it.  Protein Supplements: 1 per day.  Snacking: Adequate. Snacks include healthy choices.    Vegetables: Likes a variety. Eats daily.  Fruits: Likes a variety. Eats 2-3 times per week.  Beverages: water  Dining Out: Dining out:3-4x Monthly. Mostly restaurants.  Cooking at home: Daily. Mostly baked and grilled meat, fish and vegetables.    CURRENT EXERCISE:  Adequate. Walking 3x a week.    Vitamins / Minerals / Herbs:   Bariatric MVI, probiotic to help treat constipation, calcium + D3.    Food Allergies:   NKFA    Social:  Alcohol: Socially.  Smoking: None.    ASSESSMENT:  Patient demonstrates all willingness to change lifestyle habits as evidenced by weight loss, good exercise, including protein drinks, increased vegetables, better choices when dining out, healthier cooking at home, healthier snacking at work and healthier snacking at home.    Doing well with working on greatest challenges snacking and grazing.    Adequate calorie  intake.  Adequate protein intake.    PLAN:    Diet: Maintain diet plan.    Exercise: Maintain.    Behavior Modification: Continue to follow meal plan meeting 60 g protein goal.     Weight loss prior to surgery (5-10% TBW): -37 lbs    Return to clinic in one month.    SESSION TIME:  30 minutes

## 2022-07-20 ENCOUNTER — OFFICE VISIT (OUTPATIENT)
Dept: BARIATRICS | Facility: CLINIC | Age: 57
End: 2022-07-20
Payer: COMMERCIAL

## 2022-07-20 VITALS
DIASTOLIC BLOOD PRESSURE: 59 MMHG | HEART RATE: 88 BPM | TEMPERATURE: 98 F | SYSTOLIC BLOOD PRESSURE: 123 MMHG | BODY MASS INDEX: 34.79 KG/M2 | HEIGHT: 67 IN | RESPIRATION RATE: 16 BRPM | WEIGHT: 221.63 LBS

## 2022-07-20 DIAGNOSIS — K21.9 GASTROESOPHAGEAL REFLUX DISEASE, UNSPECIFIED WHETHER ESOPHAGITIS PRESENT: ICD-10-CM

## 2022-07-20 DIAGNOSIS — E66.01 MORBID OBESITY: Primary | ICD-10-CM

## 2022-07-20 DIAGNOSIS — M17.11 PRIMARY OSTEOARTHRITIS OF RIGHT KNEE: ICD-10-CM

## 2022-07-20 PROCEDURE — 99214 OFFICE O/P EST MOD 30 MIN: CPT | Mod: S$GLB,,, | Performed by: SURGERY

## 2022-07-20 PROCEDURE — 1159F MED LIST DOCD IN RCRD: CPT | Mod: CPTII,S$GLB,, | Performed by: SURGERY

## 2022-07-20 PROCEDURE — 3008F PR BODY MASS INDEX (BMI) DOCUMENTED: ICD-10-PCS | Mod: CPTII,S$GLB,, | Performed by: SURGERY

## 2022-07-20 PROCEDURE — 99999 PR PBB SHADOW E&M-EST. PATIENT-LVL III: CPT | Mod: PBBFAC,,, | Performed by: SURGERY

## 2022-07-20 PROCEDURE — 3044F HG A1C LEVEL LT 7.0%: CPT | Mod: CPTII,S$GLB,, | Performed by: SURGERY

## 2022-07-20 PROCEDURE — 3008F BODY MASS INDEX DOCD: CPT | Mod: CPTII,S$GLB,, | Performed by: SURGERY

## 2022-07-20 PROCEDURE — 3044F PR MOST RECENT HEMOGLOBIN A1C LEVEL <7.0%: ICD-10-PCS | Mod: CPTII,S$GLB,, | Performed by: SURGERY

## 2022-07-20 PROCEDURE — 3078F DIAST BP <80 MM HG: CPT | Mod: CPTII,S$GLB,, | Performed by: SURGERY

## 2022-07-20 PROCEDURE — 3074F SYST BP LT 130 MM HG: CPT | Mod: CPTII,S$GLB,, | Performed by: SURGERY

## 2022-07-20 PROCEDURE — 99214 PR OFFICE/OUTPT VISIT, EST, LEVL IV, 30-39 MIN: ICD-10-PCS | Mod: S$GLB,,, | Performed by: SURGERY

## 2022-07-20 PROCEDURE — 3074F PR MOST RECENT SYSTOLIC BLOOD PRESSURE < 130 MM HG: ICD-10-PCS | Mod: CPTII,S$GLB,, | Performed by: SURGERY

## 2022-07-20 PROCEDURE — 99999 PR PBB SHADOW E&M-EST. PATIENT-LVL III: ICD-10-PCS | Mod: PBBFAC,,, | Performed by: SURGERY

## 2022-07-20 PROCEDURE — 3078F PR MOST RECENT DIASTOLIC BLOOD PRESSURE < 80 MM HG: ICD-10-PCS | Mod: CPTII,S$GLB,, | Performed by: SURGERY

## 2022-07-20 PROCEDURE — 1159F PR MEDICATION LIST DOCUMENTED IN MEDICAL RECORD: ICD-10-PCS | Mod: CPTII,S$GLB,, | Performed by: SURGERY

## 2022-07-20 NOTE — PROGRESS NOTES
Follow up    SUBJECTIVE:     Chief Complaint   Patient presents with    Obesity    Gastroesophageal Reflux       History of Present Illness:    Patient is a 56 y.o. female who is referred for evaluation of surgical treatment of morbid obesity. Her Body mass index is 35.23 kg/m².  he has known comorbidities of osteoarthritis. She has not attended the bariatric seminar and is most interested in gastric bypass surgery.    Review of patient's allergies indicates:   Allergen Reactions    Pcn [penicillins] Other (See Comments)     Doesn't know reaction       Current Outpatient Medications   Medication Sig Dispense Refill    albuterol (VENTOLIN HFA) 90 mcg/actuation inhaler INHALE TWO PUFFS BY MOUTH EVERY 6 HOURS AS NEEDED 18 g 0    ergocalciferol (VITAMIN D2) 50,000 unit Cap Take 1 capsule (50,000 Units total) by mouth every 30 days. 12 capsule 0    FLUARIX QUAD 9513-6987, PF, 60 mcg (15 mcg x 4)/0.5 mL Syrg ADM 0.5ML IM UTD  0    gabapentin (NEURONTIN) 100 MG capsule Take 1 capsule (100 mg total) by mouth every evening. 90 capsule 0    meloxicam (MOBIC) 15 MG tablet TAKE ONE TABLET BY MOUTH ONCE DAILY 30 tablet 1    traZODone (DESYREL) 50 MG tablet Take 1 tablet (50 mg total) by mouth every evening. 90 tablet 0     No current facility-administered medications for this visit.       Past Medical History:   Diagnosis Date    Arthritis     BMI 37.0-37.9, adult     Fibromyalgia     Obesity      Past Surgical History:   Procedure Laterality Date    ADENOIDECTOMY      APPENDECTOMY       SECTION      CHOLECYSTECTOMY      DILATION AND CURETTAGE OF UTERUS      GASTRECTOMY  2017    LAPAROSCOPIC CHOLECYSTECTOMY WITH CHOLANGIOGRAPHY  2019    LAPAROSCOPIC CHOLECYSTECTOMY WITH CHOLANGIOGRAPHY N/A 2019    Procedure: CHOLECYSTECTOMY, LAPAROSCOPIC, WITH CHOLANGIOGRAM;  Surgeon: Eladio Dawson MD;  [facility];  Service: General;  Laterality: N/A;    TONSILLECTOMY       Family  "History   Problem Relation Age of Onset    Heart disease Mother     Arthritis Mother         RHEUMATOID    Obesity Mother     Heart disease Father     COPD Father     Arthritis Father         RHEUMATOID    Obesity Sister      Social History     Tobacco Use    Smoking status: Never Smoker    Smokeless tobacco: Never Used   Substance Use Topics    Alcohol use: No    Drug use: No        Review of Systems:  Review of Systems   Constitutional: Positive for activity change and fatigue.   Respiratory: Positive for cough.    Cardiovascular: Positive for leg swelling.   Gastrointestinal: Positive for constipation.   Musculoskeletal: Positive for joint swelling.   All other systems reviewed and are negative.      OBJECTIVE:     Vital Signs (Most Recent)  Temp: 98.1 °F (36.7 °C) (07/20/22 1616)  Pulse: 88 (07/20/22 1616)  Resp: 16 (07/20/22 1616)  BP: (!) 123/59 (07/20/22 1616)  5' 6.5" (1.689 m)  100.5 kg (221 lb 9.6 oz)     Body comp:  Fat Percent:  46.1 %  Fat Mass:  102.2 lb  FFM:  119.4 lb  TBW: 86.2 lb  TBW %:  38.9 %  BMR: 1674 kcal        Physical Exam:  Physical Exam  Vitals and nursing note reviewed.   Constitutional:       General: She is not in acute distress.     Appearance: She is well-developed. She is not diaphoretic.   HENT:      Head: Normocephalic and atraumatic.      Mouth/Throat:      Pharynx: No oropharyngeal exudate.   Eyes:      General: No scleral icterus.     Conjunctiva/sclera: Conjunctivae normal.      Pupils: Pupils are equal, round, and reactive to light.   Neck:      Thyroid: No thyromegaly.      Vascular: No JVD.      Trachea: No tracheal deviation.   Cardiovascular:      Rate and Rhythm: Normal rate and regular rhythm.      Heart sounds: Normal heart sounds. No murmur heard.    No friction rub. No gallop.   Pulmonary:      Effort: Pulmonary effort is normal. No respiratory distress.      Breath sounds: Normal breath sounds. No stridor. No wheezing or rales.   Chest:      Chest wall: " No tenderness.   Abdominal:      General: Bowel sounds are normal. There is no distension.      Palpations: Abdomen is soft. There is no mass.      Tenderness: There is no abdominal tenderness. There is no guarding or rebound.   Musculoskeletal:         General: No tenderness. Normal range of motion.      Cervical back: Normal range of motion and neck supple.      Comments: Right knee tenderness   Lymphadenopathy:      Cervical: No cervical adenopathy.   Skin:     General: Skin is warm and dry.      Findings: No erythema or rash.   Neurological:      Mental Status: She is alert and oriented to person, place, and time.      Cranial Nerves: No cranial nerve deficit.   Psychiatric:         Behavior: Behavior normal.         ASSESSMENT/PLAN:      Labs  EKG  UGI - Moderate size non sliding hiatal hernia.  1.7 cm duodenal diverticulum.  dietary consult- done  psych consult   Seminar        I will obtain the following clearances prior to surgery: cardiology     1. Morbid obesity     2. BMI 37.0-37.9, adult     3. Primary osteoarthritis of right knee     4. Gastroesophageal reflux disease, unspecified whether esophagitis present         Plan:  Nadia Saba has morbid obesity as their Body mass index is 35.23 kg/m². She would benefit from weight loss surgery and has chosen gastric bypass surgery as the preferred procedure. She understands that this is a tool and lifestyle change will be necessary to keep weight off. I went over possible complications of the chosen surgery with the patient and she is agreeable to surgery.    She has been instructed to start the pre operative diet.    She surgical date is 8/1/22    Fix hiatal hernia at same time    The patient has been taught the post operative diet and understands that she will need to stay on this diet to prevent complication.  They are aware of the post operative follow up and return to see me after surgery to treat/prevent/identify any complications.  she has been  advised to attend support groups post operatively.

## 2022-07-27 ENCOUNTER — TELEPHONE (OUTPATIENT)
Dept: BARIATRICS | Facility: CLINIC | Age: 57
End: 2022-07-27
Payer: COMMERCIAL

## 2022-07-27 NOTE — TELEPHONE ENCOUNTER
Pt made aware that pre-auth for surgery is not in at this time. Encourage pt to call insurance to see status for bariatric auth, pt reports will call me back with update.     Patient made aware might need to change date

## 2022-07-28 ENCOUNTER — TELEPHONE (OUTPATIENT)
Dept: BARIATRICS | Facility: CLINIC | Age: 57
End: 2022-07-28
Payer: COMMERCIAL

## 2022-07-28 ENCOUNTER — PATIENT MESSAGE (OUTPATIENT)
Dept: BARIATRICS | Facility: CLINIC | Age: 57
End: 2022-07-28
Payer: COMMERCIAL

## 2022-07-28 NOTE — TELEPHONE ENCOUNTER
Spoke with pt multiples times today. Financial  ([employer]) present during conversation. Patient aware that surgery is not approved at this time. Patient wanting us to call insurance to expedite approval, however, after speaking with [employer] and pt on exact cost before surgery, the patient cannot afford the surgical cost at this time. New date of service change will be placed for sx on 8/15/22, insurance will be updated and changed. Preop/post op appts will be changed and pt aware will be able to view all in [facility] pt portal. Aware preoperative diet to restart on 8/1/22, voiced understanding of diet.

## 2022-08-08 ENCOUNTER — PATIENT MESSAGE (OUTPATIENT)
Dept: BARIATRICS | Facility: CLINIC | Age: 57
End: 2022-08-08
Payer: COMMERCIAL

## 2022-08-08 ENCOUNTER — TELEPHONE (OUTPATIENT)
Dept: BARIATRICS | Facility: CLINIC | Age: 57
End: 2022-08-08
Payer: COMMERCIAL

## 2022-08-08 NOTE — TELEPHONE ENCOUNTER
Called patient at her request. She is now current mulling over if she wants to do the revision surgery.  RD recommended she stay on the pre-op diet until she has made a final decision in case she wishes to proceed.

## 2022-08-08 NOTE — TELEPHONE ENCOUNTER
Called and spoke with patient. Pt wanting to cancel bariatric sx planned for 8/15/22. Patient reports having discussed sx with family that she feels she is doing well with dieting and only has about 15lbs to he weight goal. Aware her auth is still pending ([employer] notified) and would like to follow up to diet in the mean time. Appt scheduled

## 2023-09-22 ENCOUNTER — OFFICE VISIT (OUTPATIENT)
Dept: ORTHOPEDICS | Facility: CLINIC | Age: 58
End: 2023-09-22
Payer: COMMERCIAL

## 2023-09-22 VITALS
BODY MASS INDEX: 36.58 KG/M2 | HEART RATE: 71 BPM | WEIGHT: 227.63 LBS | DIASTOLIC BLOOD PRESSURE: 80 MMHG | SYSTOLIC BLOOD PRESSURE: 127 MMHG | HEIGHT: 66 IN

## 2023-09-22 DIAGNOSIS — M25.561 CHRONIC PAIN OF RIGHT KNEE: Primary | ICD-10-CM

## 2023-09-22 DIAGNOSIS — G89.29 CHRONIC PAIN OF RIGHT KNEE: Primary | ICD-10-CM

## 2023-09-22 PROCEDURE — 3008F BODY MASS INDEX DOCD: CPT | Mod: CPTII,S$GLB,,

## 2023-09-22 PROCEDURE — 3079F PR MOST RECENT DIASTOLIC BLOOD PRESSURE 80-89 MM HG: ICD-10-PCS | Mod: CPTII,S$GLB,,

## 2023-09-22 PROCEDURE — 99213 OFFICE O/P EST LOW 20 MIN: CPT | Mod: S$GLB,,,

## 2023-09-22 PROCEDURE — 3079F DIAST BP 80-89 MM HG: CPT | Mod: CPTII,S$GLB,,

## 2023-09-22 PROCEDURE — 99999 PR PBB SHADOW E&M-EST. PATIENT-LVL IV: ICD-10-PCS | Mod: PBBFAC,,,

## 2023-09-22 PROCEDURE — 1159F MED LIST DOCD IN RCRD: CPT | Mod: CPTII,S$GLB,,

## 2023-09-22 PROCEDURE — 99213 PR OFFICE/OUTPT VISIT, EST, LEVL III, 20-29 MIN: ICD-10-PCS | Mod: S$GLB,,,

## 2023-09-22 PROCEDURE — 3074F PR MOST RECENT SYSTOLIC BLOOD PRESSURE < 130 MM HG: ICD-10-PCS | Mod: CPTII,S$GLB,,

## 2023-09-22 PROCEDURE — 99999 PR PBB SHADOW E&M-EST. PATIENT-LVL IV: CPT | Mod: PBBFAC,,,

## 2023-09-22 PROCEDURE — 3074F SYST BP LT 130 MM HG: CPT | Mod: CPTII,S$GLB,,

## 2023-09-22 PROCEDURE — 3008F PR BODY MASS INDEX (BMI) DOCUMENTED: ICD-10-PCS | Mod: CPTII,S$GLB,,

## 2023-09-22 PROCEDURE — 1159F PR MEDICATION LIST DOCUMENTED IN MEDICAL RECORD: ICD-10-PCS | Mod: CPTII,S$GLB,,

## 2023-09-22 NOTE — PROGRESS NOTES
Patient ID: Nadia Saba is a 58 y.o. female    Pain of the Right Knee      History of Present Illness:    Nadia Saba presents to clinic for right knee pain. Patient denies known DARWIN. The pain started 1 months ago and is becoming progressively worse.  Pain is located over (points to) medial knee. She reports that the pain is a 7 /10 aching pain toda. The pain is affecting ADLs and limiting desired level of activity. Denies numbness, tingling, radiation and inability to bear weight.    Previously saw Dr. Barkley and had right knee CSI in  with some improvement. She has not tried PT. Does take mobic with some improvement. Does have hx fibromyalgia, has seen rheumatology in the past. Endorses FHx of RA to parents and sister. She is going to [place] at the end of October, not interested in surgical intervention.     Mechanical symptoms: -  Instability: -    Occupation: import/export, [employer]     Ambulating: unassisted   Diabetic:  no  Smoking: no  Hx of DVT/PE: no    PAST MEDICAL HISTORY:   Past Medical History:   Diagnosis Date    Arthritis     BMI 37.0-37.9, adult     Fibromyalgia     Obesity      PAST SURGICAL HISTORY:   Past Surgical History:   Procedure Laterality Date    ADENOIDECTOMY      APPENDECTOMY       SECTION      CHOLECYSTECTOMY      DILATION AND CURETTAGE OF UTERUS      GASTRECTOMY  2017    LAPAROSCOPIC CHOLECYSTECTOMY WITH CHOLANGIOGRAPHY  2019    LAPAROSCOPIC CHOLECYSTECTOMY WITH CHOLANGIOGRAPHY N/A 2019    Procedure: CHOLECYSTECTOMY, LAPAROSCOPIC, WITH CHOLANGIOGRAM;  Surgeon: Eladio Dawson MD;  Location: [facility];  Service: General;  Laterality: N/A;    TONSILLECTOMY       FAMILY HISTORY:   Family History   Problem Relation Age of Onset    Heart disease Mother     Arthritis Mother         RHEUMATOID    Obesity Mother     Heart disease Father     COPD Father     Arthritis Father         RHEUMATOID    Obesity Sister      SOCIAL HISTORY:   Social History      Occupational History    Occupation: clerical     Employer: [employer]   Tobacco Use    Smoking status: Never    Smokeless tobacco: Never   Substance and Sexual Activity    Alcohol use: No    Drug use: No    Sexual activity: Yes     Partners: Male        MEDICATIONS:   Current Outpatient Medications:     albuterol (VENTOLIN HFA) 90 mcg/actuation inhaler, INHALE TWO PUFFS BY MOUTH EVERY 6 HOURS AS NEEDED, Disp: 18 g, Rfl: 0    gabapentin (NEURONTIN) 100 MG capsule, Take 1 capsule (100 mg total) by mouth 2 (two) times daily., Disp: 180 capsule, Rfl: 0    meloxicam (MOBIC) 15 MG tablet, Take 1 tablet (15 mg total) by mouth once daily., Disp: 30 tablet, Rfl: 1    traZODone (DESYREL) 50 MG tablet, Take 1 tablet (50 mg total) by mouth every evening., Disp: 90 tablet, Rfl: 0    clarithromycin (BIAXIN) 250 MG tablet, Take 1 tablet (250 mg total) by mouth every 12 (twelve) hours., Disp: 10 tablet, Rfl: 0    ergocalciferol (VITAMIN D2) 50,000 unit Cap, Take 1 capsule (50,000 Units total) by mouth every 30 days., Disp: 12 capsule, Rfl: 0    FLUARIX QUAD 5582-9276, PF, 60 mcg (15 mcg x 4)/0.5 mL Syrg, ADM 0.5ML IM UTD, Disp: , Rfl: 0  ALLERGIES:   Review of patient's allergies indicates:   Allergen Reactions    Pcn [penicillins] Other (See Comments)     Doesn't know reaction         Physical Exam     Vitals:    09/22/23 1502   BP: 127/80   Pulse: 71     Alert and oriented to person, place and time. No acute distress. Well-groomed, not ill appearing. Pupils round and reactive, normal respiratory effort, no audible wheezing.     OBSERVATION / INSPECTION   Gait:   Nonantalgic   Alignment:  Neutral   Scars:   None   Muscle atrophy: None  Effusion:  None   Warmth:  None   Discoloration:   None     TENDERNESS                 Patella     Negative    Peripatellar medial    Negative   Peripatellar lateral   Negative   Patellar tendon   Negative   Quad tendon     Negative    Prepatellar Bursa   +     Tibial  tubercle    Negative    Pes anserine/HS   Negative      ITB     Negative      LCL     Negative  MFC     Negative  LFC     Negative  MCL      Negative  Medial Joint Line    +   Lateral Joint Line   Negative  Quadriceps    Negative  Hamstring     Negative            Range of  Motion:        Left knee:   0-140°  Right knee:    0-140°      Patellofemoral examination:     Patella position    Centered  Crepitus (PF):    Absent   Lateral tilt:    Normal   J-sign:     None   Patellofemoral grind:   No pain       MENISCAL SIGNS:     Pain on terminal extension:  Negative  Pain on terminal flexion:  Negative  Patricks maneuver:  Negative     LIGAMENT EXAMINATION:  ACL / Lachman:  normal   PCL-Post.  drawer: normal 0 to 2mm  MCL- Valgus:  normal 0 to 2mm  LCL- Varus:    normal 0 to 2mm    Dial Test: difference c/w other side  At 30° flexion: normal (< 5°)   At 90° flexion: normal (< 5°)       STRENGTH: (* = with pain)   Quadricep   5/5  Hamstrin/5    EXTREMITY NEURO-VASCULAR EXAMINATION:   Sensation:  Grossly intact to light touch all dermatomal regions.   Motor Function:  Fully intact motor function at hip, knee, foot and ankle    DTRs;  quadriceps and  achilles 2+.  No clonus and downgoing Babinski.    Vascular status:  DP and PT pulses 2+, brisk capillary refill, symmetric.     Imaging:     Right knee X-rays ordered/reviewed by me showing no evidence of fracture or dislocation. There is no obvious malalignment. No evidence of masses, lesions or foreign bodies. Mild DJD, Kellgren Danny grade 2    MRI right knee Reviewed with the following findings:   Prepatellar bursitis  Tricompartmental grades 2-4 chondromalacia and early osteoarthrosis  Suprapatellar effusion and thickened plica  Possible partial tear versus intrasubstance degeneration of the PCL       Assessment & Plan    Chronic pain of right knee  -     X-ray Knee Ortho Bilateral with Flexion; Future; Expected date: 2023         I made the decision  to obtain old records of the patient including previous notes and imaging. New imaging was ordered today of the extremity or extremities evaluated. I independently reviewed and interpreted the radiographs and/or MRIs/CT scan today as well as prior imaging.    We discussed at length different treatment options including conservative vs surgical management. These include anti-inflammatories, acetaminophen, rest, ice, heat, formal physical therapy including strengthening and stretching exercises, home exercise programs, injections, dry needling, and finally surgical intervention.      Patient with chronic right knee pain.  This does seem to be an arthritic flare-up.  We discussed injections.  She is going to [place] soon and would like an injection closer to this state.  We will set her up in 2 weeks.  I would also like to repeat weight-bearing x-rays of her knees, order placed.  Follow up for injection.    Follow up: injection  X-rays next visit: none    All questions were answered and patient is agreeable to the above plan.

## 2023-10-04 ENCOUNTER — OFFICE VISIT (OUTPATIENT)
Dept: ORTHOPEDICS | Facility: CLINIC | Age: 58
End: 2023-10-04
Payer: COMMERCIAL

## 2023-10-04 VITALS
SYSTOLIC BLOOD PRESSURE: 113 MMHG | BODY MASS INDEX: 37.56 KG/M2 | DIASTOLIC BLOOD PRESSURE: 71 MMHG | WEIGHT: 233.69 LBS | HEART RATE: 83 BPM | HEIGHT: 66 IN

## 2023-10-04 DIAGNOSIS — G89.29 CHRONIC PAIN OF RIGHT KNEE: Primary | ICD-10-CM

## 2023-10-04 DIAGNOSIS — M25.561 CHRONIC PAIN OF RIGHT KNEE: Primary | ICD-10-CM

## 2023-10-04 PROCEDURE — 3008F PR BODY MASS INDEX (BMI) DOCUMENTED: ICD-10-PCS | Mod: CPTII,S$GLB,,

## 2023-10-04 PROCEDURE — 3078F PR MOST RECENT DIASTOLIC BLOOD PRESSURE < 80 MM HG: ICD-10-PCS | Mod: CPTII,S$GLB,,

## 2023-10-04 PROCEDURE — 1159F PR MEDICATION LIST DOCUMENTED IN MEDICAL RECORD: ICD-10-PCS | Mod: CPTII,S$GLB,,

## 2023-10-04 PROCEDURE — 20610 LARGE JOINT ASPIRATION/INJECTION: R KNEE: ICD-10-PCS | Mod: RT,S$GLB,,

## 2023-10-04 PROCEDURE — 3074F PR MOST RECENT SYSTOLIC BLOOD PRESSURE < 130 MM HG: ICD-10-PCS | Mod: CPTII,S$GLB,,

## 2023-10-04 PROCEDURE — 3078F DIAST BP <80 MM HG: CPT | Mod: CPTII,S$GLB,,

## 2023-10-04 PROCEDURE — 20610 DRAIN/INJ JOINT/BURSA W/O US: CPT | Mod: RT,S$GLB,,

## 2023-10-04 PROCEDURE — 99213 OFFICE O/P EST LOW 20 MIN: CPT | Mod: 25,S$GLB,,

## 2023-10-04 PROCEDURE — 1159F MED LIST DOCD IN RCRD: CPT | Mod: CPTII,S$GLB,,

## 2023-10-04 PROCEDURE — 99999 PR PBB SHADOW E&M-EST. PATIENT-LVL III: CPT | Mod: PBBFAC,,,

## 2023-10-04 PROCEDURE — 99999 PR PBB SHADOW E&M-EST. PATIENT-LVL III: ICD-10-PCS | Mod: PBBFAC,,,

## 2023-10-04 PROCEDURE — 3074F SYST BP LT 130 MM HG: CPT | Mod: CPTII,S$GLB,,

## 2023-10-04 PROCEDURE — 99213 PR OFFICE/OUTPT VISIT, EST, LEVL III, 20-29 MIN: ICD-10-PCS | Mod: 25,S$GLB,,

## 2023-10-04 PROCEDURE — 3008F BODY MASS INDEX DOCD: CPT | Mod: CPTII,S$GLB,,

## 2023-10-04 RX ORDER — TRIAMCINOLONE ACETONIDE 40 MG/ML
40 INJECTION, SUSPENSION INTRA-ARTICULAR; INTRAMUSCULAR
Status: COMPLETED | OUTPATIENT
Start: 2023-10-04 | End: 2023-10-04

## 2023-10-04 RX ADMIN — TRIAMCINOLONE ACETONIDE 40 MG: 40 INJECTION, SUSPENSION INTRA-ARTICULAR; INTRAMUSCULAR at 02:10

## 2023-10-04 RX ADMIN — TRIAMCINOLONE ACETONIDE 40 MG: 40 INJECTION, SUSPENSION INTRA-ARTICULAR; INTRAMUSCULAR at 03:10

## 2023-10-04 NOTE — PROCEDURES
Large Joint Aspiration/Injection: R knee    Date/Time: 10/4/2023 3:00 PM    Performed by: Ligia Knapp PA-C  Authorized by: Ligia Knapp PA-C    Consent Done?:  Yes (Verbal)  Indications:  Pain and arthritis  Site marked: the procedure site was marked    Timeout: prior to procedure the correct patient, procedure, and site was verified    Prep: patient was prepped and draped in usual sterile fashion    Local anesthetic:  Topical anesthetic and bupivacaine 0.25% without epinephrine  Anesthetic total (ml):  4      Details:  Needle Size:  22 G  Ultrasonic Guidance for needle placement?: No    Approach:  Lateral  Location:  Knee  Site:  R knee  Medications:  40 mg Triamcinolone acetonide 40 mg/ml (iaCSI)  Patient tolerance:  Patient tolerated the procedure well with no immediate complications

## 2023-10-04 NOTE — PROGRESS NOTES
Patient ID: Nadia Saba is a 58 y.o. female    Pain of the Right Knee      History of Present Illness:    Nadia Saba presents to clinic for right knee pain. Patient denies known DARWIN. The pain started 1 months ago and is becoming progressively worse.  Pain is located over (points to) medial knee. She reports that the pain is a 7 /10 aching pain toda. The pain is affecting ADLs and limiting desired level of activity. Denies numbness, tingling, radiation and inability to bear weight.    Previously saw Dr. Barkley and had right knee CSI in  with some improvement. She has not tried PT. Does take mobic with some improvement. Does have hx fibromyalgia, has seen rheumatology in the past. Endorses FHx of RA to parents and sister. She is going to [place] at the end of October, not interested in surgical intervention.     Mechanical symptoms: -  Instability: -    Occupation: import/export, H     Ambulating: unassisted   Diabetic:  no  Smoking: no  Hx of DVT/PE: no    ____________________________________________________________________    Interval history 10/4/2023: Patient returns today for follow up of right knee pain. Here today for right knee injection as she is going to [place] in a few weeks.  No concerns, pain similar to previous.       PAST MEDICAL HISTORY:   Past Medical History:   Diagnosis Date    Arthritis     BMI 37.0-37.9, adult     Fibromyalgia     Obesity      PAST SURGICAL HISTORY:   Past Surgical History:   Procedure Laterality Date    ADENOIDECTOMY      APPENDECTOMY       SECTION      CHOLECYSTECTOMY      DILATION AND CURETTAGE OF UTERUS      GASTRECTOMY  2017    LAPAROSCOPIC CHOLECYSTECTOMY WITH CHOLANGIOGRAPHY  2019    LAPAROSCOPIC CHOLECYSTECTOMY WITH CHOLANGIOGRAPHY N/A 2019    Procedure: CHOLECYSTECTOMY, LAPAROSCOPIC, WITH CHOLANGIOGRAM;  Surgeon: Eladio Dawson MD;  Location: [facility];  Service: General;  Laterality: N/A;    TONSILLECTOMY       FAMILY HISTORY:    Family History   Problem Relation Age of Onset    Heart disease Mother     Arthritis Mother         RHEUMATOID    Obesity Mother     Heart disease Father     COPD Father     Arthritis Father         RHEUMATOID    Obesity Sister      SOCIAL HISTORY:   Social History     Occupational History    Occupation: clerical     Employer: [id number]   Tobacco Use    Smoking status: Never    Smokeless tobacco: Never   Substance and Sexual Activity    Alcohol use: No    Drug use: No    Sexual activity: Yes     Partners: Male        MEDICATIONS:   Current Outpatient Medications:     albuterol (VENTOLIN HFA) 90 mcg/actuation inhaler, INHALE TWO PUFFS BY MOUTH EVERY 6 HOURS AS NEEDED, Disp: 18 g, Rfl: 0    FLUARIX QUAD 0192-5591, PF, 60 mcg (15 mcg x 4)/0.5 mL Syrg, ADM 0.5ML IM UTD, Disp: , Rfl: 0    gabapentin (NEURONTIN) 100 MG capsule, Take 1 capsule (100 mg total) by mouth 2 (two) times daily., Disp: 180 capsule, Rfl: 0    meloxicam (MOBIC) 15 MG tablet, Take 1 tablet (15 mg total) by mouth once daily., Disp: 30 tablet, Rfl: 1    traZODone (DESYREL) 50 MG tablet, Take 1 tablet (50 mg total) by mouth every evening., Disp: 90 tablet, Rfl: 0    clarithromycin (BIAXIN) 250 MG tablet, Take 1 tablet (250 mg total) by mouth every 12 (twelve) hours., Disp: 10 tablet, Rfl: 0    ergocalciferol (VITAMIN D2) 50,000 unit Cap, Take 1 capsule (50,000 Units total) by mouth every 30 days., Disp: 12 capsule, Rfl: 0    Current Facility-Administered Medications:     triamcinolone acetonide injection 40 mg, 40 mg, Intra-articular, 1 time in Clinic/HOD, Ligia Knapp PA-C  ALLERGIES:   Review of patient's allergies indicates:   Allergen Reactions    Pcn [penicillins] Other (See Comments)     Doesn't know reaction         Physical Exam     Vitals:    10/04/23 1437   BP: 113/71   Pulse: 83     Alert and oriented to person, place and time. No acute distress. Well-groomed, not ill appearing. Pupils round and reactive, normal  respiratory effort, no audible wheezing.     OBSERVATION / INSPECTION   Gait:   Nonantalgic   Alignment:  Neutral   Scars:   None   Muscle atrophy: None  Effusion:  None   Warmth:  None   Discoloration:   None     TENDERNESS                 Patella     Negative    Peripatellar medial    Negative   Peripatellar lateral   Negative   Patellar tendon   Negative   Quad tendon     Negative    Prepatellar Bursa   +     Tibial tubercle    Negative    Pes anserine/HS   Negative      ITB     Negative      LCL     Negative  MFC     Negative  LFC     Negative  MCL      Negative  Medial Joint Line    +   Lateral Joint Line   Negative  Quadriceps    Negative  Hamstring     Negative            Range of  Motion:        Left knee:   0-140°  Right knee:    0-140°      Patellofemoral examination:     Patella position    Centered  Crepitus (PF):    Absent   Lateral tilt:    Normal   J-sign:     None   Patellofemoral grind:   No pain       MENISCAL SIGNS:     Pain on terminal extension:  Negative  Pain on terminal flexion:  Negative  Patricks maneuver:  Negative     LIGAMENT EXAMINATION:  ACL / Lachman:  normal   PCL-Post.  drawer: normal 0 to 2mm  MCL- Valgus:  normal 0 to 2mm  LCL- Varus:    normal 0 to 2mm    Dial Test: difference c/w other side  At 30° flexion: normal (< 5°)   At 90° flexion: normal (< 5°)       STRENGTH: (* = with pain)   Quadricep   5/5  Hamstrin/5    EXTREMITY NEURO-VASCULAR EXAMINATION:   Sensation:  Grossly intact to light touch all dermatomal regions.   Motor Function:  Fully intact motor function at hip, knee, foot and ankle    DTRs;  quadriceps and  achilles 2+.  No clonus and downgoing Babinski.    Vascular status:  DP and PT pulses 2+, brisk capillary refill, symmetric.     Imaging:     Right knee X-rays ordered/reviewed by me showing no evidence of fracture or dislocation. There is no obvious malalignment. No evidence of masses, lesions or foreign bodies. Mild DJD, Kellgren Danny grade  2    MRI right knee Reviewed with the following findings:   Prepatellar bursitis  Tricompartmental grades 2-4 chondromalacia and early osteoarthrosis  Suprapatellar effusion and thickened plica  Possible partial tear versus intrasubstance degeneration of the PCL       bilateral knee xrays order/reviewed by me showing mild medial compartment narrowing to the right knee.  There are large osteophytes in the lateral compartment as well.  Kellgren Danny grade 2.  Left knee overall unremarkable.  No evidence of acute fractures, dislocations, masses or lesions.    Assessment & Plan    Chronic pain of right knee  -     triamcinolone acetonide injection 40 mg  -     Large Joint Aspiration/Injection: R knee      Patient with chronic right knee pain.  She is going to [place] in a few weeks and would like injection.  Right knee injection given, post-injection instructions reviewed.  She will follow up as needed.    Follow up: prn  X-rays next visit: none    All questions were answered and patient is agreeable to the above plan.

## 2024-03-11 NOTE — PROGRESS NOTES
Answers submitted by the patient for this visit:  Rheumatology Questionnaire (Submitted on 3/13/2024)  fever: No  eye redness: No  mouth sores: No  headaches: No  shortness of breath: No  chest pain: No  trouble swallowing: No  diarrhea: No  constipation: No  unexpected weight change: No  genital sore: No  dysuria: No  During the last 3 days, have you had a skin rash?: No  Bruises or bleeds easily: No  cough: No  Subjective:     Patient ID: Nadia Saba is a 58 y.o. female     Chief Complaint: Disease Management and Pain       HPI  58 yr old w hx of DESIREE at age 10--details unknown; she apparently was dx by a GP;   She has been having chronic pain in different joints over the years & has had injections in her joints w variable responses.  She was also dx as having fibromyalgia by a GP in 2017. She has seen a rheumatologist at [facility] years ago but does not remember outcome.  She is here because she had labs at [facility] that revealed a +CALI. They were obtained by her PCP as she is concerned about RA.  She states both her parents had RA; mom also had fibromyalgia.  She also had 2 maternal cousins w SLE- at age 32 & her sister  at age 36.  Patient states currently her pain all over has worsened & does not appear to respond well to gabapentin which she has been on x 3 yrs.  She admits she is under a great deal of stress, but denies depression or anxiety.       Answers no unless otherwise indicated  AM stiffness 30 minutes    .  joint pain: knees R>; arms shoulders; upper back   LBP ok  joint swelling R knee; PIPs;   Raynaud's --finger tips turn white in cold  Dysphagia --has a hernia--epigastric feeling   tight skin  Alopecia--thinning,   oral/nasal ulcers   parotid gland swelling  dry eyes  dry mouth  pleurisy  pericarditis  Photosensitivity--gets spots on  Photophobia +  skin rashes +  Miscarriages--0/3: FT 2 vaginal; 1 CSx  Thromboses  muscle weakness  fevers  Headaches in past had migraines-dx as  such  conjunctivitis  chronic or bloody diarrhea--has IBS; but no blood  vaginal/urethral D/C   paresthesias   thyroid issues--patient w underactive thyroid years ago; has granddaughter w thyroid;   Family hx of CTDs or SpA    CSI = 40 = moderate    Fibroassess  Widespread pain index--8  Symptom severity score--5      Current Outpatient Medications   Medication Sig Dispense Refill    albuterol (VENTOLIN HFA) 90 mcg/actuation inhaler INHALE TWO PUFFS BY MOUTH EVERY 6 HOURS AS NEEDED 18 g 0    FLUARIX QUAD 5012-9778, PF, 60 mcg (15 mcg x 4)/0.5 mL Syrg ADM 0.5ML IM UTD  0    meloxicam (MOBIC) 15 MG tablet Take 1 tablet (15 mg total) by mouth once daily. 30 tablet 1    terbinafine HCL (LAMISIL) 250 mg tablet Take 250 mg by mouth.      traZODone (DESYREL) 50 MG tablet Take 1 tablet (50 mg total) by mouth every evening. 90 tablet 0    gabapentin (NEURONTIN) 100 MG capsule Take 1 capsule (100 mg total) by mouth 2 (two) times daily. 180 capsule 0     No current facility-administered medications for this visit.       Review of patient's allergies indicates:   Allergen Reactions    Pcn [penicillins] Other (See Comments)     Doesn't know reaction       Review of Systems   Constitutional:  Negative for fever and unexpected weight change.   HENT:  Negative for mouth sores and trouble swallowing.    Eyes:  Negative for redness.   Respiratory:  Negative for cough and shortness of breath.    Cardiovascular:  Negative for chest pain.   Gastrointestinal:  Negative for constipation and diarrhea.   Genitourinary:  Negative for dysuria and genital sores.   Skin:  Negative for rash.   Neurological:  Negative for headaches.   Hematological:  Does not bruise/bleed easily.     Past Medical History:   Diagnosis Date    Arthritis     BMI 37.0-37.9, adult     Fibromyalgia     Obesity        Past Surgical History:   Procedure Laterality Date    ADENOIDECTOMY      APPENDECTOMY       SECTION      CHOLECYSTECTOMY      DILATION AND  "CURETTAGE OF UTERUS      GASTRECTOMY  2017    LAPAROSCOPIC CHOLECYSTECTOMY WITH CHOLANGIOGRAPHY  08/22/2019    LAPAROSCOPIC CHOLECYSTECTOMY WITH CHOLANGIOGRAPHY N/A 8/22/2019    Procedure: CHOLECYSTECTOMY, LAPAROSCOPIC, WITH CHOLANGIOGRAM;  Surgeon: Eladio Dawson MD;  Location: [facility];  Service: General;  Laterality: N/A;    TONSILLECTOMY         Family History   Problem Relation Age of Onset    Heart disease Mother     Arthritis Mother         RHEUMATOID    Obesity Mother     Heart disease Father     COPD Father     Arthritis Father         RHEUMATOID    Obesity Sister        Social History     Tobacco Use    Smoking status: Never    Smokeless tobacco: Never   Substance Use Topics    Alcohol use: No    Drug use: No   Happily  x 42 yrs  Works at home.   Has 10 grandchildren  Has 3 great grandsons & another little girl due  Objective:     /63   Pulse 79   Ht 5' 6" (1.676 m)   Wt 104 kg (229 lb 4.5 oz)   LMP 09/26/2013   BMI 37.01 kg/m²     Physical Exam   Constitutional: She is oriented to person, place, and time. She appears well-developed and well-nourished. No distress.   HENT:   Head: Normocephalic and atraumatic.   Mouth/Throat: Oropharynx is clear and moist. No oropharyngeal exudate.   No facial rashes  Parotids not enlarged     Eyes: Pupils are equal, round, and reactive to light. Conjunctivae are normal. Right eye exhibits no discharge. Left eye exhibits no discharge. No scleral icterus.   Neck: No JVD present. No tracheal deviation present. No thyromegaly present.   Cardiovascular: Regular rhythm. Exam reveals no gallop and no friction rub.   No murmur heard.  Pulmonary/Chest: Effort normal and breath sounds normal. No respiratory distress. She has no wheezes. She has no rales. She exhibits no tenderness.   Abdominal: Soft. Bowel sounds are normal. She exhibits no distension and no mass. There is no abdominal tenderness. There is no rebound and no guarding.   Musculoskeletal:    "      General: Tenderness (several FM pts TTP 3-4/5) present. No deformity. Normal range of motion.      Cervical back: Normal range of motion and neck supple.      Comments: No synovitis anywhere.  FROM all joints.  Bilateral bony knee hypertrophy  Pako HV feet.     Lymphadenopathy:     She has no cervical adenopathy.   Neurological: She is alert and oriented to person, place, and time. She has normal reflexes. No cranial nerve deficit. Gait normal.   Skin: Skin is warm and dry. She is not diaphoretic.   No PsO  No nail changes.  No sclerodactyly  No tapering.  No ischemic ulcers   Psychiatric: Her behavior is normal. Mood, memory, affect and judgment normal.   Vitals reviewed.    3/4/24: ESR 2; CBC ok; CMP glu 42; Hg A1C; TFTs; CALI 1:40H; nuclear speckled & 1:80 H; cytoplasmic  Assessment:   +CALI  Joint pain multiple sites  DESIREE by hx  Family hx of RA  Family hx of SLE  Fibromyalgia  Chronic pain R knee c/w OA  Disseminated Superficial Actinic Porokeratosis (DSAP)   Dx by bx by Dr. Hall  Obesity   S/P gastric sleeve 2017   On semaglutide  Plan:   Discussed significance of +CALI & lack of convincing evidence for the clinical dx of CTD.  In an abundance of caution we will get further w/u & imaging.  The patient was educated on fibromyalgia/central sensitivity syndromes  Symptoms/presentations, non-pharmacologic and pharmacologic treatments and their efficacies were reviewed.   Non-pharmacologic approaches were stressed.  ExPRESS was reviewed in detail.  Regular/daily dedicated, low impact aerobic exercise was especially emphasized. Strategies for success were offered.  Additional mind body exercise such as yoga or variations (chair yoga) and/or Zia Chi have been validated.  Cognitive behavioral therapy is very helpful.  Mood[employer]ym (online) may be helpful.  Pharmacologic treatments approved and otherwise were reviewed.  Patient was provided with written information and referred to several websites for further  information.    As I do not manage or follow fibromyalgia, the patient may be followed by her PCP and/or her other clinicians.      RTC prn    Addendum: ESR 11; CRP 0.9; CBC ok; CMP ok; RF/CCP neg; Vit D 29;

## 2024-03-14 ENCOUNTER — HOSPITAL ENCOUNTER (OUTPATIENT)
Dept: RADIOLOGY | Facility: HOSPITAL | Age: 59
Discharge: HOME | End: 2024-03-14
Attending: INTERNAL MEDICINE
Payer: COMMERCIAL

## 2024-03-14 ENCOUNTER — OFFICE VISIT (OUTPATIENT)
Dept: RHEUMATOLOGY | Facility: CLINIC | Age: 59
End: 2024-03-14
Payer: COMMERCIAL

## 2024-03-14 VITALS
HEIGHT: 66 IN | BODY MASS INDEX: 36.84 KG/M2 | SYSTOLIC BLOOD PRESSURE: 114 MMHG | HEART RATE: 79 BPM | WEIGHT: 229.25 LBS | DIASTOLIC BLOOD PRESSURE: 63 MMHG

## 2024-03-14 DIAGNOSIS — Z55.9 EDUCATIONAL CIRCUMSTANCE: ICD-10-CM

## 2024-03-14 DIAGNOSIS — R76.8 POSITIVE ANA (ANTINUCLEAR ANTIBODY): ICD-10-CM

## 2024-03-14 DIAGNOSIS — E55.9 VITAMIN D INSUFFICIENCY: ICD-10-CM

## 2024-03-14 DIAGNOSIS — Z87.39: ICD-10-CM

## 2024-03-14 DIAGNOSIS — M17.11 PRIMARY OSTEOARTHRITIS OF RIGHT KNEE: ICD-10-CM

## 2024-03-14 DIAGNOSIS — M25.50 PAIN IN JOINT INVOLVING MULTIPLE SITES: ICD-10-CM

## 2024-03-14 DIAGNOSIS — Z82.61 FAMILY HISTORY OF RHEUMATOID ARTHRITIS: ICD-10-CM

## 2024-03-14 DIAGNOSIS — R76.8 POSITIVE ANA (ANTINUCLEAR ANTIBODY): Primary | ICD-10-CM

## 2024-03-14 DIAGNOSIS — E66.9 OBESITY (BMI 30-39.9): ICD-10-CM

## 2024-03-14 DIAGNOSIS — M79.7 FIBROMYALGIA: ICD-10-CM

## 2024-03-14 PROCEDURE — 1160F RVW MEDS BY RX/DR IN RCRD: CPT | Mod: CPTII,S$GLB,, | Performed by: INTERNAL MEDICINE

## 2024-03-14 PROCEDURE — 99205 OFFICE O/P NEW HI 60 MIN: CPT | Mod: S$GLB,,, | Performed by: INTERNAL MEDICINE

## 2024-03-14 PROCEDURE — 77077 JOINT SURVEY SINGLE VIEW: CPT | Mod: 26,,, | Performed by: RADIOLOGY

## 2024-03-14 PROCEDURE — 3074F SYST BP LT 130 MM HG: CPT | Mod: CPTII,S$GLB,, | Performed by: INTERNAL MEDICINE

## 2024-03-14 PROCEDURE — 77077 JOINT SURVEY SINGLE VIEW: CPT | Mod: TC

## 2024-03-14 PROCEDURE — 1159F MED LIST DOCD IN RCRD: CPT | Mod: CPTII,S$GLB,, | Performed by: INTERNAL MEDICINE

## 2024-03-14 PROCEDURE — 99999 PR PBB SHADOW E&M-EST. PATIENT-LVL IV: CPT | Mod: PBBFAC,,, | Performed by: INTERNAL MEDICINE

## 2024-03-14 PROCEDURE — 3078F DIAST BP <80 MM HG: CPT | Mod: CPTII,S$GLB,, | Performed by: INTERNAL MEDICINE

## 2024-03-14 PROCEDURE — 3008F BODY MASS INDEX DOCD: CPT | Mod: CPTII,S$GLB,, | Performed by: INTERNAL MEDICINE

## 2024-03-14 RX ORDER — TERBINAFINE HYDROCHLORIDE 250 MG/1
250 TABLET ORAL
COMMUNITY
Start: 2024-02-27

## 2024-03-14 SDOH — SOCIAL DETERMINANTS OF HEALTH (SDOH): PROBLEMS RELATED TO EDUCATION AND LITERACY, UNSPECIFIED: Z55.9

## 2024-03-14 NOTE — PROGRESS NOTES
3/13/2024     2:29 PM   Rapid3 Question Responses and Scores   MDHAQ Score 0.1   Psychologic Score 1.1   Pain Score 3   When you awakened in the morning OVER THE LAST WEEK, did you feel stiff? Yes   If Yes, please indicate the number of hours until you are as limber as you will be for the day 2   Fatigue Score 7   Global Health Score 4   RAPID3 Score 2.44     Answers submitted by the patient for this visit:  Rheumatology Questionnaire (Submitted on 3/13/2024)  fever: No  eye redness: No  mouth sores: No  headaches: No  shortness of breath: No  chest pain: No  trouble swallowing: No  diarrhea: No  constipation: No  unexpected weight change: No  genital sore: No  dysuria: No  During the last 3 days, have you had a skin rash?: No  Bruises or bleeds easily: No  cough: No

## 2024-03-14 NOTE — PATIENT INSTRUCTIONS
You do not have SLE, but will check labs for that & RA anyway.    Read on fibromyalgia/central sensitivity syndrome    A good resource is www.fmaware.org    Begin a program of daily, aerobic, low impact, dedicated exercise.  Don't overdo; Don't underdo.  Always warm up & cool down.  Chair yoga and/or Zia chi are very helpful.    You may want to get a paraffin wax bath for your hands.

## 2024-05-16 ENCOUNTER — OFFICE VISIT (OUTPATIENT)
Dept: ORTHOPEDICS | Facility: CLINIC | Age: 59
End: 2024-05-16
Payer: COMMERCIAL

## 2024-05-16 VITALS
HEART RATE: 75 BPM | SYSTOLIC BLOOD PRESSURE: 103 MMHG | HEIGHT: 66 IN | WEIGHT: 205.56 LBS | DIASTOLIC BLOOD PRESSURE: 72 MMHG | BODY MASS INDEX: 33.04 KG/M2

## 2024-05-16 DIAGNOSIS — M17.11 PRIMARY OSTEOARTHRITIS OF RIGHT KNEE: Primary | ICD-10-CM

## 2024-05-16 PROCEDURE — 3074F SYST BP LT 130 MM HG: CPT | Mod: CPTII,S$GLB,,

## 2024-05-16 PROCEDURE — 3008F BODY MASS INDEX DOCD: CPT | Mod: CPTII,S$GLB,,

## 2024-05-16 PROCEDURE — 1160F RVW MEDS BY RX/DR IN RCRD: CPT | Mod: CPTII,S$GLB,,

## 2024-05-16 PROCEDURE — 99999 PR PBB SHADOW E&M-EST. PATIENT-LVL IV: CPT | Mod: PBBFAC,,,

## 2024-05-16 PROCEDURE — 1159F MED LIST DOCD IN RCRD: CPT | Mod: CPTII,S$GLB,,

## 2024-05-16 PROCEDURE — 99213 OFFICE O/P EST LOW 20 MIN: CPT | Mod: S$GLB,,,

## 2024-05-16 PROCEDURE — 3078F DIAST BP <80 MM HG: CPT | Mod: CPTII,S$GLB,,

## 2024-05-16 NOTE — PROGRESS NOTES
Patient ID: Nadia Saba is a 58 y.o. female    Pain and Follow-up of the Right Knee      History of Present Illness:    Nadia Saba presents to clinic for right knee pain. Patient denies known DARWIN. The pain started 1 months ago and is becoming progressively worse.  Pain is located over (points to) medial knee. She reports that the pain is a 7 /10 aching pain toda. The pain is affecting ADLs and limiting desired level of activity. Denies numbness, tingling, radiation and inability to bear weight.    Previously saw Dr. Barkley and had right knee CSI in  with some improvement. She has not tried PT. Does take mobic with some improvement. Does have hx fibromyalgia, has seen rheumatology in the past. Endorses FHx of RA to parents and sister. She is going to [place] at the end of October, not interested in surgical intervention.     Mechanical symptoms: -  Instability: -    Occupation: import/export, H     Ambulating: unassisted   Diabetic:  no  Smoking: no  Hx of DVT/PE: no    ____________________________________________________________________    Interval history 10/4/2023: Patient returns today for follow up of right knee pain. Here today for right knee injection as she is going to [place] in a few weeks.  No concerns, pain similar to previous.     ____________________________________________________________________    Interval history 2024 : Patient returns today for follow up of right knee pain. States injection lasted up until about 2 months ago. Pain similar to previous, lateral sided right knee.      PAST MEDICAL HISTORY:   Past Medical History:   Diagnosis Date    Arthritis     BMI 37.0-37.9, adult     Fibromyalgia     Obesity      PAST SURGICAL HISTORY:   Past Surgical History:   Procedure Laterality Date    ADENOIDECTOMY      APPENDECTOMY       SECTION      CHOLECYSTECTOMY      DILATION AND CURETTAGE OF UTERUS      GASTRECTOMY  2017    LAPAROSCOPIC CHOLECYSTECTOMY WITH CHOLANGIOGRAPHY   08/22/2019    LAPAROSCOPIC CHOLECYSTECTOMY WITH CHOLANGIOGRAPHY N/A 8/22/2019    Procedure: CHOLECYSTECTOMY, LAPAROSCOPIC, WITH CHOLANGIOGRAM;  Surgeon: Eladio Dawson MD;  Location: [facility];  Service: General;  Laterality: N/A;    TONSILLECTOMY       FAMILY HISTORY:   Family History   Problem Relation Name Age of Onset    Heart disease Mother      Arthritis Mother          RHEUMATOID    Obesity Mother      Heart disease Father      COPD Father      Arthritis Father          RHEUMATOID    Obesity Sister       SOCIAL HISTORY:   Social History     Occupational History    Occupation: clerical     Employer: [employer]   Tobacco Use    Smoking status: Never    Smokeless tobacco: Never   Substance and Sexual Activity    Alcohol use: No    Drug use: No    Sexual activity: Yes     Partners: Male        MEDICATIONS:   Current Outpatient Medications:     albuterol (VENTOLIN HFA) 90 mcg/actuation inhaler, INHALE TWO PUFFS BY MOUTH EVERY 6 HOURS AS NEEDED, Disp: 18 g, Rfl: 0    FLUARIX QUAD 6718-9004, PF, 60 mcg (15 mcg x 4)/0.5 mL Syrg, ADM 0.5ML IM UTD, Disp: , Rfl: 0    meloxicam (MOBIC) 15 MG tablet, Take 1 tablet (15 mg total) by mouth once daily., Disp: 30 tablet, Rfl: 1    terbinafine HCL (LAMISIL) 250 mg tablet, Take 250 mg by mouth., Disp: , Rfl:     traZODone (DESYREL) 50 MG tablet, Take 1 tablet (50 mg total) by mouth every evening., Disp: 90 tablet, Rfl: 0    gabapentin (NEURONTIN) 100 MG capsule, Take 1 capsule (100 mg total) by mouth 2 (two) times daily., Disp: 180 capsule, Rfl: 0  ALLERGIES:   Review of patient's allergies indicates:   Allergen Reactions    Pcn [penicillins] Other (See Comments)     Doesn't know reaction         Physical Exam     Vitals:    05/16/24 0941   BP: 103/72   Pulse: 75       Alert and oriented to person, place and time. No acute distress. Well-groomed, not ill appearing. Pupils round and reactive, normal respiratory effort, no audible wheezing.     OBSERVATION /  INSPECTION   Gait:   Nonantalgic   Alignment:  Neutral   Scars:   None   Muscle atrophy: None  Effusion:  None   Warmth:  None   Discoloration:   None     TENDERNESS                 Patella     Negative    Peripatellar medial    Negative   Peripatellar lateral   Negative   Patellar tendon   Negative   Quad tendon     Negative    Prepatellar Bursa   +     Tibial tubercle    Negative    Pes anserine/HS   Negative      ITB     Negative      LCL     Negative  MFC     Negative  LFC     Negative  MCL      Negative  Medial Joint Line    +   Lateral Joint Line   Negative  Quadriceps    Negative  Hamstring     Negative            Range of  Motion:        Left knee:   0-140°  Right knee:    0-140°      Patellofemoral examination:     Patella position    Centered  Crepitus (PF):    Absent   Lateral tilt:    Normal   J-sign:     None   Patellofemoral grind:   No pain       MENISCAL SIGNS:     Pain on terminal extension:  Negative  Pain on terminal flexion:  Negative  Patricks maneuver:  Negative     LIGAMENT EXAMINATION:  ACL / Lachman:  normal   PCL-Post.  drawer: normal 0 to 2mm  MCL- Valgus:  normal 0 to 2mm  LCL- Varus:    normal 0 to 2mm    Dial Test: difference c/w other side  At 30° flexion: normal (< 5°)   At 90° flexion: normal (< 5°)       STRENGTH: (* = with pain)   Quadricep   5/5  Hamstrin/5    EXTREMITY NEURO-VASCULAR EXAMINATION:   Sensation:  Grossly intact to light touch all dermatomal regions.   Motor Function:  Fully intact motor function at hip, knee, foot and ankle    DTRs;  quadriceps and  achilles 2+.  No clonus and downgoing Babinski.    Vascular status:  DP and PT pulses 2+, brisk capillary refill, symmetric.     Imaging:     Right knee X-rays ordered/reviewed by me showing no evidence of fracture or dislocation. There is no obvious malalignment. No evidence of masses, lesions or foreign bodies. Mild DJD, Kellgren Danny grade 2-3    MRI right knee Reviewed with the following findings:    Prepatellar bursitis  Tricompartmental grades 2-4 chondromalacia and early osteoarthrosis  Suprapatellar effusion and thickened plica  Possible partial tear versus intrasubstance degeneration of the PCL       bilateral knee xrays order/reviewed by me showing mild medial compartment narrowing to the right knee.  There are large osteophytes in the lateral compartment as well.  Kellgren Danny grade 2.  Left knee overall unremarkable.  No evidence of acute fractures, dislocations, masses or lesions.    Assessment & Plan    Primary osteoarthritis of right knee  -     Prior authorization Order      Patient returns to clinic for follow up of right knee pain.  We discussed repeating steroid versus viscosupplementation.  Patient would like to trial viscosupplementation.  Prior Auth for Euflexxa placed.    Medical Necessity for viscosupplementation use: After thorough evaluation of the patient, I have determined that viscosupplementation treatment is medically necessary. The patient has painful degenerative joint disease (DJD) of the knee(s) with failure of conservative treatments including lifestyle modifications and rehabilitation exercises. Oral analgesics including NSAIDs have not adequately controlled the patient's symptoms. There is radiographic evidence of Kellgren-Danny grade II (or greater) osteoarthritic (OA) changes, or if lack of radiographic evidence, there is arthroscopic or other evidence of chondrosis of the knee(s).     Pre-authorization placed for Euflexxa series injections.  Ice compress to the affected area 2-3x a day for 15-20 minutes as needed for pain management  NSAIDs PRN for pain management.   RTC to see Ligia odom PA-C for Euflexxa series injections    Follow up: Euflexxa injections  X-rays next visit: none    All questions were answered and patient is agreeable to the above plan.

## 2024-05-22 ENCOUNTER — HOSPITAL ENCOUNTER (OUTPATIENT)
Dept: RADIOLOGY | Facility: HOSPITAL | Age: 59
Discharge: HOME | End: 2024-05-22
Attending: PHYSICAL MEDICINE & REHABILITATION
Payer: COMMERCIAL

## 2024-05-22 DIAGNOSIS — Z12.31 ENCOUNTER FOR SCREENING MAMMOGRAM FOR MALIGNANT NEOPLASM OF BREAST: ICD-10-CM

## 2024-05-22 PROCEDURE — 77063 BREAST TOMOSYNTHESIS BI: CPT | Mod: 26,,, | Performed by: RADIOLOGY

## 2024-05-22 PROCEDURE — 77067 SCR MAMMO BI INCL CAD: CPT | Mod: 26,,, | Performed by: RADIOLOGY

## 2024-05-22 PROCEDURE — 77063 BREAST TOMOSYNTHESIS BI: CPT | Mod: TC

## 2024-05-22 PROCEDURE — 77067 SCR MAMMO BI INCL CAD: CPT | Mod: TC

## 2024-05-28 ENCOUNTER — PATIENT MESSAGE (OUTPATIENT)
Dept: ORTHOPEDICS | Facility: CLINIC | Age: 59
End: 2024-05-28
Payer: COMMERCIAL

## 2024-06-04 ENCOUNTER — OFFICE VISIT (OUTPATIENT)
Dept: ORTHOPEDICS | Facility: CLINIC | Age: 59
End: 2024-06-04
Payer: COMMERCIAL

## 2024-06-04 VITALS
WEIGHT: 202.63 LBS | HEIGHT: 66 IN | SYSTOLIC BLOOD PRESSURE: 110 MMHG | DIASTOLIC BLOOD PRESSURE: 73 MMHG | BODY MASS INDEX: 32.56 KG/M2

## 2024-06-04 DIAGNOSIS — M17.11 PRIMARY OSTEOARTHRITIS OF RIGHT KNEE: Primary | ICD-10-CM

## 2024-06-04 PROCEDURE — 20610 DRAIN/INJ JOINT/BURSA W/O US: CPT | Mod: RT,S$GLB,,

## 2024-06-04 PROCEDURE — 99499 UNLISTED E&M SERVICE: CPT | Mod: S$GLB,,,

## 2024-06-04 PROCEDURE — 99999 PR PBB SHADOW E&M-EST. PATIENT-LVL III: CPT | Mod: PBBFAC,,,

## 2024-06-04 NOTE — PROGRESS NOTES
Nadia Saba is here for follow up of right knee arthritis. Pt is requesting Euflexxa series injections #1 of 3.  [facility]. Has failed other conservative modalities including NSAIDS, activity modification, weight loss.    The prior shot was tolerated well.    PHYSICAL EXAMINATION:     General: The patient is alert and oriented x 3. Mood is pleasant.   Observation of ears, eyes and nose reveals no gross abnormalities. No   labored breathing observed.     No signs of infection or adverse reaction to knee.    Medical Necessity for viscosupplementation use: After thorough evaluation of the patient, I have determined that viscosupplementation treatment is medically necessary. The patient has painful degenerative joint disease (DJD) of the knee(s) with failure of conservative treatments including lifestyle modifications and rehabilitation exercises. Oral analgesics including NSAIDs have not adequately controlled the patient's symptoms. There is radiographic evidence of Kellgren-Danny grade II (or greater) osteoarthritic (OA) changes, or if lack of radiographic evidence, there is arthroscopic or other evidence of chondrosis of the knee(s).     Injection Procedure  A time out was performed, including verification of patient ID, procedure, site and side, availability of information and equipment, review of safety issues, and agreement with consent, the procedure site was marked.    After time out was performed, the patient was prepped aseptically with chloraprep. A diagnostic and therapeutic injection of 2cc Euflexxa was given under sterile technique using a 22g x 1.5 needle from the anterolateral  aspect of the right Knee Joint in the sitting position.      Nadia Saba had no adverse reactions to the medication. Pain decreased. She was instructed to apply ice to the joint for 20 minutes and avoid strenuous activities for 24-36 hours following the injection. She was warned of possible  blood sugar and/or blood pressure changes during that time. Following that time, she can resume regular activities.    She was reminded to call the clinic immediately for any adverse side effects as explained in clinic today.    RTC to see Ligia Knapp PA-C for Euflexxa series injections #2 of 3.    All of the patient's questions were answered and the patient will contact us if they have any questions or concerns in the interim.

## 2024-06-04 NOTE — PROCEDURES
Large Joint Aspiration/Injection: R knee    Date/Time: 6/4/2024 9:30 AM    Performed by: Ligia Knapp PA-C  Authorized by: Ligia Knapp PA-C    Consent Done?:  Yes (Verbal)  Indications:  Pain and arthritis  Site marked: the procedure site was marked    Timeout: prior to procedure the correct patient, procedure, and site was verified    Prep: patient was prepped and draped in usual sterile fashion      Local anesthesia used?: Yes    Local anesthetic:  Topical anesthetic    Details:  Needle Size:  22 G  Ultrasonic Guidance for needle placement?: No    Location:  Knee  Site:  R knee  Medications:  2 mL sodium hyaluronate (Euflexxa) solution 10 mg/mL  Patient tolerance:  Patient tolerated the procedure well with no immediate complications

## 2024-06-11 ENCOUNTER — OFFICE VISIT (OUTPATIENT)
Dept: ORTHOPEDICS | Facility: CLINIC | Age: 59
End: 2024-06-11
Payer: COMMERCIAL

## 2024-06-11 VITALS
WEIGHT: 202.63 LBS | DIASTOLIC BLOOD PRESSURE: 66 MMHG | HEART RATE: 77 BPM | SYSTOLIC BLOOD PRESSURE: 103 MMHG | HEIGHT: 66 IN | BODY MASS INDEX: 32.56 KG/M2

## 2024-06-11 DIAGNOSIS — M17.11 PRIMARY OSTEOARTHRITIS OF RIGHT KNEE: Primary | ICD-10-CM

## 2024-06-11 PROCEDURE — 99499 UNLISTED E&M SERVICE: CPT | Mod: S$GLB,,,

## 2024-06-11 PROCEDURE — 20610 DRAIN/INJ JOINT/BURSA W/O US: CPT | Mod: RT,S$GLB,,

## 2024-06-11 PROCEDURE — 99999 PR PBB SHADOW E&M-EST. PATIENT-LVL III: CPT | Mod: PBBFAC,,,

## 2024-06-11 NOTE — PROCEDURES
Large Joint Aspiration/Injection: R knee    Date/Time: 6/11/2024 9:45 AM    Performed by: Ligia Knapp PA-C  Authorized by: Ligia Knapp PA-C    Consent Done?:  Yes (Verbal)  Indications:  Pain and arthritis  Site marked: the procedure site was marked    Timeout: prior to procedure the correct patient, procedure, and site was verified    Prep: patient was prepped and draped in usual sterile fashion    Local anesthetic:  Topical anesthetic    Details:  Needle Size:  22 G  Ultrasonic Guidance for needle placement?: No    Approach:  Anterolateral  Location:  Knee  Site:  R knee  Medications:  2 mL sodium hyaluronate (Euflexxa) solution 10 mg/mL  Patient tolerance:  Patient tolerated the procedure well with no immediate complications

## 2024-06-11 NOTE — PROGRESS NOTES
Nadia Saba is here for follow up of right knee arthritis. Pt is requesting Euflexxa series injections #2 of 3.  [facility] Has failed other conservative modalities including NSAIDS, activity modification, weight loss.    The prior shot was tolerated well.    PHYSICAL EXAMINATION:     General: The patient is alert and oriented x 3. Mood is pleasant.   Observation of ears, eyes and nose reveals no gross abnormalities. No   labored breathing observed.     No signs of infection or adverse reaction to knee.    Medical Necessity for viscosupplementation use: After thorough evaluation of the patient, I have determined that viscosupplementation treatment is medically necessary. The patient has painful degenerative joint disease (DJD) of the knee(s) with failure of conservative treatments including lifestyle modifications and rehabilitation exercises. Oral analgesics including NSAIDs have not adequately controlled the patient's symptoms. There is radiographic evidence of Kellgren-Danny grade II (or greater) osteoarthritic (OA) changes, or if lack of radiographic evidence, there is arthroscopic or other evidence of chondrosis of the knee(s).     Injection Procedure  A time out was performed, including verification of patient ID, procedure, site and side, availability of information and equipment, review of safety issues, and agreement with consent, the procedure site was marked.    After time out was performed, the patient was prepped aseptically with chloraprep. A diagnostic and therapeutic injection of 2cc Euflexxa was given under sterile technique using a 22g x 1.5 needle from the anterolateral  aspect of the right Knee Joint in the sitting position.      Nadia Saba had no adverse reactions to the medication. Pain decreased. She was instructed to apply ice to the joint for 20 minutes and avoid strenuous activities for 24-36 hours following the injection. She was warned of possible  blood sugar and/or blood pressure changes during that time. Following that time, she can resume regular activities.    She was reminded to call the clinic immediately for any adverse side effects as explained in clinic today.    RTC to see Ligia Knapp PA-C for Euflexxa series injections #3 of 3.    All of the patient's questions were answered and the patient will contact us if they have any questions or concerns in the interim.

## 2024-06-18 ENCOUNTER — OFFICE VISIT (OUTPATIENT)
Dept: ORTHOPEDICS | Facility: CLINIC | Age: 59
End: 2024-06-18
Payer: COMMERCIAL

## 2024-06-18 DIAGNOSIS — M17.11 PRIMARY OSTEOARTHRITIS OF RIGHT KNEE: Primary | ICD-10-CM

## 2024-06-18 PROCEDURE — 99499 UNLISTED E&M SERVICE: CPT | Mod: 25,S$GLB,,

## 2024-06-18 PROCEDURE — 99999 PR PBB SHADOW E&M-EST. PATIENT-LVL II: CPT | Mod: PBBFAC,,,

## 2024-06-18 PROCEDURE — 20610 DRAIN/INJ JOINT/BURSA W/O US: CPT | Mod: RT,S$GLB,,

## 2024-06-18 NOTE — PROCEDURES
Large Joint Aspiration/Injection: R knee    Date/Time: 6/18/2024 2:30 PM    Performed by: Ligia Knapp PA-C  Authorized by: Ligia Knapp PA-C    Consent Done?:  Yes (Verbal)  Indications:  Pain  Site marked: the procedure site was marked    Timeout: prior to procedure the correct patient, procedure, and site was verified    Prep: patient was prepped and draped in usual sterile fashion      Local anesthesia used?: Yes    Local anesthetic:  Bupivacaine 0.25% without epinephrine    Details:  Needle Size:  22 G  Ultrasonic Guidance for needle placement?: No    Approach:  Anterolateral  Location:  Knee  Site:  R knee  Medications:  2 mL sodium hyaluronate (Euflexxa) solution 10 mg/mL  Patient tolerance:  Patient tolerated the procedure well with no immediate complications

## 2024-06-18 NOTE — PROGRESS NOTES
Nadia Saba is here for follow up of right knee arthritis. Pt is requesting Euflexxa series injections #3 of 3.  [facility] Has failed other conservative modalities including NSAIDS, activity modification, weight loss.    The prior shot was tolerated well.    PHYSICAL EXAMINATION:     General: The patient is alert and oriented x 3. Mood is pleasant.   Observation of ears, eyes and nose reveals no gross abnormalities. No   labored breathing observed.     No signs of infection or adverse reaction to knee.    Medical Necessity for viscosupplementation use: After thorough evaluation of the patient, I have determined that viscosupplementation treatment is medically necessary. The patient has painful degenerative joint disease (DJD) of the knee(s) with failure of conservative treatments including lifestyle modifications and rehabilitation exercises. Oral analgesics including NSAIDs have not adequately controlled the patient's symptoms. There is radiographic evidence of Kellgren-Danny grade II (or greater) osteoarthritic (OA) changes, or if lack of radiographic evidence, there is arthroscopic or other evidence of chondrosis of the knee(s).     Injection Procedure  A time out was performed, including verification of patient ID, procedure, site and side, availability of information and equipment, review of safety issues, and agreement with consent, the procedure site was marked.    After time out was performed, the patient was prepped aseptically with chloraprep. A diagnostic and therapeutic injection of 2cc Euflexxa was given under sterile technique using a 22g x 1.5 needle from the anterolateral  aspect of the right Knee Joint in the sitting position.      Nadia Saba had no adverse reactions to the medication. Pain decreased. She was instructed to apply ice to the joint for 20 minutes and avoid strenuous activities for 24-36 hours following the injection. She was warned of possible  blood sugar and/or blood pressure changes during that time. Following that time, she can resume regular activities.    She was reminded to call the clinic immediately for any adverse side effects as explained in clinic today.    RTC to see Ligia Knapp PA-C in 3 months or prn.     All of the patient's questions were answered and the patient will contact us if they have any questions or concerns in the interim.

## 2024-12-13 DIAGNOSIS — M17.11 PRIMARY OSTEOARTHRITIS OF RIGHT KNEE: Primary | ICD-10-CM

## 2024-12-17 ENCOUNTER — OFFICE VISIT (OUTPATIENT)
Dept: ORTHOPEDICS | Facility: CLINIC | Age: 59
End: 2024-12-17
Payer: COMMERCIAL

## 2024-12-17 VITALS
HEIGHT: 66 IN | HEART RATE: 72 BPM | DIASTOLIC BLOOD PRESSURE: 76 MMHG | BODY MASS INDEX: 32.56 KG/M2 | SYSTOLIC BLOOD PRESSURE: 130 MMHG | WEIGHT: 202.63 LBS

## 2024-12-17 DIAGNOSIS — M17.11 PRIMARY OSTEOARTHRITIS OF RIGHT KNEE: Primary | ICD-10-CM

## 2024-12-17 PROCEDURE — 99214 OFFICE O/P EST MOD 30 MIN: CPT | Mod: S$GLB,,,

## 2024-12-17 PROCEDURE — 3078F DIAST BP <80 MM HG: CPT | Mod: CPTII,S$GLB,,

## 2024-12-17 PROCEDURE — 3075F SYST BP GE 130 - 139MM HG: CPT | Mod: CPTII,S$GLB,,

## 2024-12-17 PROCEDURE — 3008F BODY MASS INDEX DOCD: CPT | Mod: CPTII,S$GLB,,

## 2024-12-17 PROCEDURE — 1159F MED LIST DOCD IN RCRD: CPT | Mod: CPTII,S$GLB,,

## 2024-12-17 PROCEDURE — 99999 PR PBB SHADOW E&M-EST. PATIENT-LVL III: CPT | Mod: PBBFAC,,,

## 2024-12-17 NOTE — PROGRESS NOTES
Patient ID: Nadia Saba is a 59 y.o. female    Pain of the Right Knee      History of Present Illness:    Nadia Saba presents to clinic for follow up of right knee pain.  Previously been treated by myself for knee pain.  She has tried cortisone and viscosupplementation.  States cortisone injections are more effect of the viscosupplementation.  She feels instability and now is having medial and lateral knee pain.  She notes difficulty going up and down steps.    Autoimmune conditions:+ DESIREE/fibromyalgia, + family hx of SLE    DEXA Scan: normal     Occupation: [employer]    Ambulating: unassisted   Diabetic:  no  Smoking: no  Hx of DVT/PE: no    PAST MEDICAL HISTORY:   Past Medical History:   Diagnosis Date    Arthritis     BMI 37.0-37.9, adult     Fibromyalgia     Obesity      PAST SURGICAL HISTORY:   Past Surgical History:   Procedure Laterality Date    ADENOIDECTOMY      APPENDECTOMY       SECTION      CHOLECYSTECTOMY      DILATION AND CURETTAGE OF UTERUS      GASTRECTOMY  2017    LAPAROSCOPIC CHOLECYSTECTOMY WITH CHOLANGIOGRAPHY  2019    LAPAROSCOPIC CHOLECYSTECTOMY WITH CHOLANGIOGRAPHY N/A 2019    Procedure: CHOLECYSTECTOMY, LAPAROSCOPIC, WITH CHOLANGIOGRAM;  Surgeon: Eladio Dawson MD;  Location: [facility];  Service: General;  Laterality: N/A;    TONSILLECTOMY       FAMILY HISTORY:   Family History   Problem Relation Name Age of Onset    Heart disease Mother      Arthritis Mother          RHEUMATOID    Obesity Mother      Heart disease Father      COPD Father      Arthritis Father          RHEUMATOID    Obesity Sister       SOCIAL HISTORY:   Social History     Occupational History    Occupation: clerical     Employer: [employer]   Tobacco Use    Smoking status: Never    Smokeless tobacco: Never   Substance and Sexual Activity    Alcohol use: No    Drug use: No    Sexual activity: Yes     Partners: Male        MEDICATIONS:   Current Outpatient Medications:      albuterol (VENTOLIN HFA) 90 mcg/actuation inhaler, INHALE TWO PUFFS BY MOUTH EVERY 6 HOURS AS NEEDED, Disp: 18 g, Rfl: 0    FLUARIX QUAD 7199-3828, PF, 60 mcg (15 mcg x 4)/0.5 mL Syrg, ADM 0.5ML IM UTD, Disp: , Rfl: 0    gabapentin (NEURONTIN) 100 MG capsule, Take 1 capsule (100 mg total) by mouth 2 (two) times daily., Disp: 180 capsule, Rfl: 0    meloxicam (MOBIC) 15 MG tablet, Take 1 tablet (15 mg total) by mouth once daily., Disp: 30 tablet, Rfl: 1    terbinafine HCL (LAMISIL) 250 mg tablet, Take 250 mg by mouth., Disp: , Rfl:     traZODone (DESYREL) 50 MG tablet, Take 1 tablet (50 mg total) by mouth every evening., Disp: 90 tablet, Rfl: 0  ALLERGIES:   Review of patient's allergies indicates:   Allergen Reactions    Pcn [penicillins] Other (See Comments)     Doesn't know reaction         Physical Exam     Vitals:    12/17/24 1142   BP: 130/76   Pulse: 72     Alert and oriented to person, place and time. No acute distress. Well-groomed, not ill appearing. Pupils round and reactive, normal respiratory effort, no audible wheezing.     GENERAL:  A well-developed, well-nourished 59 y.o. female who is alert and       oriented in no acute distress.      Gait: She  walks with a normal gait.                   EXTREMITIES:  Examination of lower extremities reveals there is no visible mass or deformity.      Right knee:  ROM 0-120, pain after 90 with flexion    Ligamentously stable to varus/valgus stress.    Anterior and posterior drawers negative.    No pain over pes bursa.    No warmth    No erythema    Effusion No    medial, lateral joint line tenderness    Positive Patellofemoral grind/crepitus     The skin over both lower extremities is normal and unremarkable.  She has a  painless range of motion of the hips and ankles bilaterally.   Sensation is intact in both lower extremities.    There are no motor deficits in the lower extremities bilaterally.   Pedal pulses are palpable distally bilaterally.    She has no calf  tenderness to palpation nor edema.    Imaging:     3 view  bilateral Knee X-rays ordered/reviewed by me showing no evidence of fracture or dislocation. There is no obvious malalignment. No evidence of masses, lesions or foreign bodies.  Mild tricompartmental DJD, most severe right patellofemoral joint    Assessment & Plan    Primary osteoarthritis of right knee  -     MRI Knee Without Contrast Right; Future; Expected date: 12/17/2024         I made the decision to obtain old records of the patient including previous notes and imaging. New imaging was ordered today of the extremity or extremities evaluated. I independently reviewed and interpreted the radiographs and/or MRIs/CT scan today as well as prior imaging.    We discussed at length different treatment options including conservative vs surgical management. These include anti-inflammatories, acetaminophen, rest, ice, heat, formal physical therapy including strengthening and stretching exercises, home exercise programs, injections, dry needling, and finally surgical intervention.      Patient here for chronic right knee pain.  She has tried and failed viscosupplementation and steroid injections.  We discussed her x-rays which do show some arthritis however joint space fairly maintained.  We discussed possible arthroscopic procedure.  Pending MRI results.  We will proceed MRI of the right knee to evaluate for any degenerative meniscal pathology.  If able to treat arthroscopically, patient would likely prefer this option.  Could also consider TKA pending MRI results.  Hold on injection until MRI results.    MRI right knee, patient requested DIS in [place]     Follow up:  MRI results  X-rays next visit:  None    All questions were answered and patient is agreeable to the above plan.

## 2025-01-07 ENCOUNTER — TELEPHONE (OUTPATIENT)
Dept: ORTHOPEDICS | Facility: CLINIC | Age: 60
End: 2025-01-07
Payer: COMMERCIAL

## 2025-01-07 ENCOUNTER — PATIENT MESSAGE (OUTPATIENT)
Dept: ORTHOPEDICS | Facility: CLINIC | Age: 60
End: 2025-01-07
Payer: COMMERCIAL

## 2025-01-07 DIAGNOSIS — G89.29 CHRONIC PAIN OF RIGHT KNEE: ICD-10-CM

## 2025-01-07 DIAGNOSIS — M25.561 CHRONIC PAIN OF RIGHT KNEE: ICD-10-CM

## 2025-01-07 DIAGNOSIS — M17.11 PRIMARY OSTEOARTHRITIS OF RIGHT KNEE: Primary | ICD-10-CM

## 2025-01-15 ENCOUNTER — HOSPITAL ENCOUNTER (OUTPATIENT)
Dept: RADIOLOGY | Facility: HOSPITAL | Age: 60
Discharge: HOME | End: 2025-01-15
Payer: COMMERCIAL

## 2025-01-15 DIAGNOSIS — M17.11 PRIMARY OSTEOARTHRITIS OF RIGHT KNEE: ICD-10-CM

## 2025-01-15 DIAGNOSIS — G89.29 CHRONIC PAIN OF RIGHT KNEE: ICD-10-CM

## 2025-01-15 DIAGNOSIS — M25.561 CHRONIC PAIN OF RIGHT KNEE: ICD-10-CM

## 2025-01-15 PROCEDURE — 73721 MRI JNT OF LWR EXTRE W/O DYE: CPT | Mod: 26,RT,, | Performed by: RADIOLOGY

## 2025-01-15 PROCEDURE — 73721 MRI JNT OF LWR EXTRE W/O DYE: CPT | Mod: TC,RT

## 2025-01-22 ENCOUNTER — TELEPHONE (OUTPATIENT)
Dept: ORTHOPEDICS | Facility: CLINIC | Age: 60
End: 2025-01-22
Payer: COMMERCIAL

## 2025-01-22 NOTE — TELEPHONE ENCOUNTER
----- Message from Med Assistant Levy sent at 1/22/2025  3:24 PM CST -----  Regarding: appt  concern  Contact: pt [phone]  Type:  Needs Medical Advice    Who Called:  Nadia is  calling to  see if the  office  will be  open on  tomorrow please advise   Would the patient rather a call back or a response via [facility]? Call back   Best Call Back Number: pt [phone]   Additional Information:

## 2025-02-05 ENCOUNTER — OFFICE VISIT (OUTPATIENT)
Dept: ORTHOPEDICS | Facility: CLINIC | Age: 60
End: 2025-02-05
Payer: COMMERCIAL

## 2025-02-05 VITALS
SYSTOLIC BLOOD PRESSURE: 126 MMHG | DIASTOLIC BLOOD PRESSURE: 74 MMHG | HEART RATE: 72 BPM | WEIGHT: 253.06 LBS | BODY MASS INDEX: 40.85 KG/M2

## 2025-02-05 DIAGNOSIS — M17.11 PRIMARY OSTEOARTHRITIS OF RIGHT KNEE: ICD-10-CM

## 2025-02-05 DIAGNOSIS — G62.9 NEUROPATHY: ICD-10-CM

## 2025-02-05 PROCEDURE — 3008F BODY MASS INDEX DOCD: CPT | Mod: CPTII,S$GLB,,

## 2025-02-05 PROCEDURE — 99213 OFFICE O/P EST LOW 20 MIN: CPT | Mod: 25,S$GLB,,

## 2025-02-05 PROCEDURE — 1159F MED LIST DOCD IN RCRD: CPT | Mod: CPTII,S$GLB,,

## 2025-02-05 PROCEDURE — 1160F RVW MEDS BY RX/DR IN RCRD: CPT | Mod: CPTII,S$GLB,,

## 2025-02-05 PROCEDURE — 20610 DRAIN/INJ JOINT/BURSA W/O US: CPT | Mod: RT,S$GLB,,

## 2025-02-05 PROCEDURE — 3078F DIAST BP <80 MM HG: CPT | Mod: CPTII,S$GLB,,

## 2025-02-05 PROCEDURE — 3074F SYST BP LT 130 MM HG: CPT | Mod: CPTII,S$GLB,,

## 2025-02-05 PROCEDURE — 99999 PR PBB SHADOW E&M-EST. PATIENT-LVL III: CPT | Mod: PBBFAC,,,

## 2025-02-05 RX ORDER — TRIAMCINOLONE ACETONIDE 40 MG/ML
40 INJECTION, SUSPENSION INTRA-ARTICULAR; INTRAMUSCULAR
Status: DISCONTINUED | OUTPATIENT
Start: 2025-02-05 | End: 2025-02-05 | Stop reason: HOSPADM

## 2025-02-05 RX ORDER — GABAPENTIN 300 MG/1
300 CAPSULE ORAL 2 TIMES DAILY
Qty: 90 CAPSULE | Refills: 1 | Status: SHIPPED | OUTPATIENT
Start: 2025-02-05 | End: 2026-02-05

## 2025-02-05 RX ORDER — TRIAMCINOLONE ACETONIDE 40 MG/ML
40 INJECTION, SUSPENSION INTRA-ARTICULAR; INTRAMUSCULAR
Status: COMPLETED | OUTPATIENT
Start: 2025-02-05 | End: 2025-02-05

## 2025-02-05 RX ADMIN — TRIAMCINOLONE ACETONIDE 40 MG: 40 INJECTION, SUSPENSION INTRA-ARTICULAR; INTRAMUSCULAR at 09:02

## 2025-02-05 NOTE — PROGRESS NOTES
Patient ID: Nadia Saba is a 59 y.o. female    Pain of the Right Knee (MRI results)      History of Present Illness:    Nadia Saba presents to clinic for follow up of right knee pain.  Previously been treated by myself for knee pain.  She has tried cortisone and viscosupplementation.  States cortisone injections are more effect of the viscosupplementation.  She feels instability and now is having medial and lateral knee pain.  She notes difficulty going up and down steps.    Autoimmune conditions:+ DESIREE/fibromyalgia, + family hx of SLE    DEXA Scan: normal     Occupation: [employer]    Ambulating: unassisted   Diabetic:  no  Smoking: no  Hx of DVT/PE: no    ____________________________________________________________________    Interval history 2025 : Patient returns today for follow up of right knee pain.  She is here to discuss MRI results.  States she went off her meloxicam for a couple of weeks and recently restarted and feels like this helped her knee pain.  She is also on gabapentin 100 mg which she was previously taking only once a day but has increased to twice daily and thinks this is also improving her pain.  She denies any drowsiness with the medication.  States her knee pain is feeling okay at this time.      PAST MEDICAL HISTORY:   Past Medical History:   Diagnosis Date    Arthritis     BMI 37.0-37.9, adult     Fibromyalgia     Obesity      PAST SURGICAL HISTORY:   Past Surgical History:   Procedure Laterality Date    ADENOIDECTOMY      APPENDECTOMY       SECTION      CHOLECYSTECTOMY      DILATION AND CURETTAGE OF UTERUS      GASTRECTOMY  2017    LAPAROSCOPIC CHOLECYSTECTOMY WITH CHOLANGIOGRAPHY  2019    LAPAROSCOPIC CHOLECYSTECTOMY WITH CHOLANGIOGRAPHY N/A 2019    Procedure: CHOLECYSTECTOMY, LAPAROSCOPIC, WITH CHOLANGIOGRAM;  Surgeon: Eladio Dawson MD;  Location: [facility];  Service: General;  Laterality: N/A;    TONSILLECTOMY       FAMILY HISTORY:   Family History    Problem Relation Name Age of Onset    Heart disease Mother      Arthritis Mother          RHEUMATOID    Obesity Mother      Heart disease Father      COPD Father      Arthritis Father          RHEUMATOID    Obesity Sister       SOCIAL HISTORY:   Social History     Occupational History    Occupation: clerical     Employer: [employer]   Tobacco Use    Smoking status: Never    Smokeless tobacco: Never   Substance and Sexual Activity    Alcohol use: No    Drug use: No    Sexual activity: Yes     Partners: Male        MEDICATIONS:   Current Outpatient Medications:     albuterol (VENTOLIN HFA) 90 mcg/actuation inhaler, INHALE TWO PUFFS BY MOUTH EVERY 6 HOURS AS NEEDED, Disp: 18 g, Rfl: 0    FLUARIX QUAD [id number], PF, 60 mcg (15 mcg x 4)/0.5 mL Syrg, ADM 0.5ML IM UTD, Disp: , Rfl: 0    gabapentin (NEURONTIN) 300 MG capsule, Take 1 capsule (300 mg total) by mouth 2 (two) times daily., Disp: 90 capsule, Rfl: 1    meloxicam (MOBIC) 15 MG tablet, Take 1 tablet (15 mg total) by mouth once daily., Disp: 30 tablet, Rfl: 1    terbinafine HCL (LAMISIL) 250 mg tablet, Take 250 mg by mouth., Disp: , Rfl:     traZODone (DESYREL) 50 MG tablet, Take 1 tablet (50 mg total) by mouth every evening., Disp: 90 tablet, Rfl: 0  No current facility-administered medications for this visit.  ALLERGIES:   Review of patient's allergies indicates:   Allergen Reactions    Pcn [penicillins] Other (See Comments)     Doesn't know reaction         Physical Exam     Vitals:    02/05/25 0918   BP: 126/74   Pulse: 72       Alert and oriented to person, place and time. No acute distress. Well-groomed, not ill appearing. Pupils round and reactive, normal respiratory effort, no audible wheezing.     GENERAL:  A well-developed, well-nourished 59 y.o. female who is alert and       oriented in no acute distress.      Gait: She  walks with a normal gait.                   EXTREMITIES:  Examination of lower extremities reveals there is no visible  mass or deformity.      Right knee:  ROM 0-120, pain after 90 with flexion    Ligamentously stable to varus/valgus stress.    Anterior and posterior drawers negative.    No pain over pes bursa.    No warmth    No erythema    Effusion No    medial, lateral joint line tenderness    Positive Patellofemoral grind/crepitus     The skin over both lower extremities is normal and unremarkable.  She has a  painless range of motion of the hips and ankles bilaterally.   Sensation is intact in both lower extremities.    There are no motor deficits in the lower extremities bilaterally.   Pedal pulses are palpable distally bilaterally.    She has no calf tenderness to palpation nor edema.    Imaging:     3 view  bilateral Knee X-rays ordered/reviewed by me showing no evidence of fracture or dislocation. There is no obvious malalignment. No evidence of masses, lesions or foreign bodies.  Mild tricompartmental DJD, most severe right patellofemoral joint    MRI right knee reviewed by me which shows severe chondromalacia in patellofemoral compartment as well as area of full-thickness cartilage loss in tibiofemoral compartment    Assessment & Plan    Primary osteoarthritis of right knee  -     gabapentin (NEURONTIN) 300 MG capsule; Take 1 capsule (300 mg total) by mouth 2 (two) times daily.  Dispense: 90 capsule; Refill: 1  -     triamcinolone acetonide injection 40 mg  -     Large Joint Aspiration/Injection: R knee    Neuropathy  -     gabapentin (NEURONTIN) 300 MG capsule; Take 1 capsule (300 mg total) by mouth 2 (two) times daily.  Dispense: 90 capsule; Refill: 1      Patient here for follow up of right knee pain.  We discussed her MRI results which show arthritis.  She is not interested in any surgical intervention at this time.  She feels cortisone injections have been helpful in the past and last was over a year ago.  She wishes to repeat this today.  Also discussed PRP injections which she is going to research and will let us  know if she would like to go to [place] for this.  Understands there is an out-of-pocket cost associated.    Right knee CSI given, post-injection instructions reviewed.  May repeat every 3 months as needed.  Discussed risks and benefits of repeating cortisone injections    Follow up:  prn  X-rays next visit:  None    All questions were answered and patient is agreeable to the above plan.

## 2025-02-05 NOTE — PROCEDURES
Large Joint Aspiration/Injection: R knee    Date/Time: 2/5/2025 9:30 AM    Performed by: Ligia Knapp PA-C  Authorized by: Ligia Knapp PA-C    Consent Done?:  Yes (Verbal)  Indications:  Pain and arthritis  Site marked: the procedure site was marked    Timeout: prior to procedure the correct patient, procedure, and site was verified    Prep: patient was prepped and draped in usual sterile fashion      Local anesthesia used?: Yes    Local anesthetic:  Bupivacaine 0.25% without epinephrine and topical anesthetic  Anesthetic total (ml):  4      Details:  Needle Size:  21 G  Ultrasonic Guidance for needle placement?: No    Approach:  Anterolateral  Location:  Knee  Site:  R knee  Medications:  40 mg triamcinolone acetonide 40 mg/mL  Patient tolerance:  Patient tolerated the procedure well with no immediate complications

## 2025-02-27 ENCOUNTER — RESULTS FOLLOW-UP (OUTPATIENT)
Dept: OTOLARYNGOLOGY | Facility: CLINIC | Age: 60
End: 2025-02-27

## 2025-02-27 ENCOUNTER — TELEPHONE (OUTPATIENT)
Dept: OTOLARYNGOLOGY | Facility: CLINIC | Age: 60
End: 2025-02-27
Payer: COMMERCIAL

## 2025-02-27 ENCOUNTER — OFFICE VISIT (OUTPATIENT)
Dept: OTOLARYNGOLOGY | Facility: CLINIC | Age: 60
End: 2025-02-27
Payer: COMMERCIAL

## 2025-02-27 VITALS
BODY MASS INDEX: 38.34 KG/M2 | DIASTOLIC BLOOD PRESSURE: 74 MMHG | WEIGHT: 238.56 LBS | HEIGHT: 66 IN | SYSTOLIC BLOOD PRESSURE: 116 MMHG | HEART RATE: 75 BPM

## 2025-02-27 DIAGNOSIS — D44.0 NEOPLASM OF UNCERTAIN BEHAVIOR OF THYROID GLAND: Primary | ICD-10-CM

## 2025-02-27 DIAGNOSIS — Z87.798: ICD-10-CM

## 2025-02-27 PROCEDURE — 99204 OFFICE O/P NEW MOD 45 MIN: CPT | Mod: S$GLB,,, | Performed by: OTOLARYNGOLOGY

## 2025-02-27 PROCEDURE — 3008F BODY MASS INDEX DOCD: CPT | Mod: CPTII,S$GLB,, | Performed by: OTOLARYNGOLOGY

## 2025-02-27 PROCEDURE — 3078F DIAST BP <80 MM HG: CPT | Mod: CPTII,S$GLB,, | Performed by: OTOLARYNGOLOGY

## 2025-02-27 PROCEDURE — 99999 PR PBB SHADOW E&M-EST. PATIENT-LVL III: CPT | Mod: PBBFAC,,, | Performed by: OTOLARYNGOLOGY

## 2025-02-27 PROCEDURE — 1159F MED LIST DOCD IN RCRD: CPT | Mod: CPTII,S$GLB,, | Performed by: OTOLARYNGOLOGY

## 2025-02-27 PROCEDURE — 3074F SYST BP LT 130 MM HG: CPT | Mod: CPTII,S$GLB,, | Performed by: OTOLARYNGOLOGY

## 2025-02-27 NOTE — PROGRESS NOTES
[facility]    Subjective:      Patient: Nadia Saba Patient PCP: Aliyah Lopez         :  1965     Sex:  female      MRN:  [id number]          Date of Visit: 2025      Chief Complaint: Thyroid Problem (Patient was recommended by Linnette Ramirez, JAMIEP to see specialist for cyst noduleon Thyroid. Patient notice lump 2025. Ultrasound Soft Tissue Head Neck done 2025. Patient states she constantly has to clear her throat, sleeps on two pillows for comfort. Pt states certain foods make her choke such as bread. Patient denies any pain./)      Patient ID: Nadia Saba is a 59 y.o. female     Patient is a  lifelong NON-smoker with a past medical history of headaches, depression, fibromyalgia self-referred for findings on soft tissue ultrasound 2025.  No prior subsequent dedicated thyroid ultrasound.  No cross-sectional imaging of the head and neck.  Did have an upper GI done in  for obesity revealing a duodenal diverticulum and small non sliding hiatal hernia.  Images reviewed do not show any upper esophageal cricopharyngeal dysfunction or diverticulum.  Symptomatically patient complains of chronic throat clearing and feels somewhat choked with drier foods such as bread.  No sore throat, hemoptysis, otalgia, weight loss or voice change.    2025 US SOFT TISSUE HEAD NECK     CLINICAL HISTORY:  Localized swelling, mass and lump, neck     TECHNIQUE:  Ultrasound of the neck was performed     COMPARISON:  None     FINDINGS:  Region of concern demonstrates a complex cystic lesion in the region of the right thyroid lobe measuring 2.8 x 1.7 x 2.5 cm and containing punctate internal echogenic foci and small peripheral nodular soft tissue.  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  Note additional calcifications in the suspected adjacent thyroid gland.  Recommend further evaluation with dedicated thyroid ultrasound.     Impression:     As  above.        Electronically signed by:Be Sánchez    2022 upper GI            Labs:  WBC   Date Value Ref Range Status   2024 4.04 3.90 - 12.70 K/uL Final     Hemoglobin   Date Value Ref Range Status   2024 13.7 12.0 - 16.0 g/dL Final     Platelets   Date Value Ref Range Status   2024 252 150 - 450 K/uL Final     Creatinine   Date Value Ref Range Status   2024 1.0 0.5 - 1.4 mg/dL Final     TSH   Date Value Ref Range Status   2020 1.76 0.40 - 4.50 mIU/L Final     Hemoglobin A1C   Date Value Ref Range Status   2022 5.0 4.0 - 5.6 % Final     Comment:     ADA Screening Guidelines:  5.7-6.4%  Consistent with prediabetes  >or=6.5%  Consistent with diabetes    High levels of fetal hemoglobin interfere with the HbA1C  assay. Heterozygous hemoglobin variants (HbS, HgC, etc)do  not significantly interfere with this assay.   However, presence of multiple variants may affect accuracy.         Past Medical History  She has a past medical history of Arthritis, BMI 37.0-37.9, adult, Fibromyalgia, and Obesity.    Family / Surgical / Social History  Her family history includes Arthritis in her father and mother; COPD in her father; Heart disease in her father and mother; Obesity in her mother and sister.    Past Surgical History:   Procedure Laterality Date    ADENOIDECTOMY      APPENDECTOMY       SECTION      CHOLECYSTECTOMY      DILATION AND CURETTAGE OF UTERUS      GASTRECTOMY  2017    LAPAROSCOPIC CHOLECYSTECTOMY WITH CHOLANGIOGRAPHY  2019    LAPAROSCOPIC CHOLECYSTECTOMY WITH CHOLANGIOGRAPHY N/A 2019    Procedure: CHOLECYSTECTOMY, LAPAROSCOPIC, WITH CHOLANGIOGRAM;  Surgeon: Eladio Dawson MD;  Location: [facility];  Service: General;  Laterality: N/A;    TONSILLECTOMY         Social History     Tobacco Use    Smoking status: Never    Smokeless tobacco: Never   Substance and Sexual Activity    Alcohol use: No    Drug use: No    Sexual activity: Yes      "Partners: Male       Medications  She has a current medication list which includes the following prescription(s): albuterol, fluarix quad [id number] (pf), gabapentin, meloxicam, terbinafine hcl, and trazodone.      Allergies  Review of patient's allergies indicates:   Allergen Reactions    Pcn [penicillins] Other (See Comments)     Doesn't know reaction       All medications, allergies, and past history have been reviewed.    Objective:      Vitals:      6/11/2024     9:46 AM 12/17/2024    11:42 AM 2/5/2025     9:18 AM   Vitals - 1 value per visit   SYSTOLIC 103 130 126   DIASTOLIC 66 76 74   Pulse 77 72 72   Weight (lb) 202.6 202.6 253.09   Weight (kg) 91.9 91.9 114.8   Height 5' 6" (1.676 m) 5' 6" (1.676 m)    BMI (Calculated) 32.7 32.7    Pain Score Four Six Three       There is no height or weight on file to calculate BSA.    Physical Exam:    GENERAL  APPEARANCE -  alert, appears stated age, and cooperative  BARRIER(S) TO COMMUNICATION -  none VOICE - appropriate for age and gender    INTEGUMENTARY  no suspicious head and neck lesions    HEENT  HEAD: Normocephalic, without obvious abnormality, atraumatic  FACE: INSPECTION - Symmetric, no signs of trauma, no suspicious lesion(s)      STRENGTH - facial symmetry intact     PALPATION -  No masses     SALIVARY GLANDS - non-tender with no appreciable mass    NECK/THYROID:  Tethering from mid horizontal neck incision from prior thyroglossal duct excision.  Left thyroid lobe was not particularly palpable right thyroid lobe medially is full and proximally 2.5-3 cm normal overlying skin but mildly tender though soft.  Does not move with swallow per se.    EYES  Normal occular alignment and mobility with no visible nystagmus at rest    EARS/NOSE/MOUTH/THROAT  EARS  PINNAE AND EXTERNAL EARS - no suspicious lesion OTOSCOPIC EXAM (surgical microscopy was not used for visualization/instrumentation): EAR EXAM - Normal ear canals, tympanic membranes and mobility, and middle ear " spaces bilaterally.  HEARING - grossly intact to voice/finger rub    NOSE AND SINUSES  EXTERNAL NOSE - Grossly normal for age/sex  SEPTUM - normal/no obstruction on anterior exam without decongestion TURBINATES - within normal limits MUCOSA - within normal limits     MOUTH AND THROAT   ORAL CAVITY, LIPS, TEETH, GUMS & TONGUE - moist, no suspicious lesions  OROPHARYNX /TONSILS/PHARYNGEAL WALLS/HYPOPHARYNX - no erythema or exudates  NASOPHARYNX - limited mirror exam - unable to visualize due to anatomy/gag  LARYNX -  - limited mirror exam - unable to fully visualize movement of the arytenoids which otherwise appear normal.      CHEST AND LUNG   INSPECTION & AUSCULTATION - normal effort, no stridor    CARDIOVASCULAR  AUSCULTATION & PERIPHERAL VASCULAR - regular rate and rhythm.    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  No known radiation exposure.  So that is still seems unlikely.  Evaluation by Dr. Dillon and Dr. Mccord with some mixed opinions about the limited CT 1 feeling it was consistent with a colloid cyst which carries no risk of malignancy versus a TI-RADS 4.  As such rather than proceeding with biopsy on limited information I have ordered laboratory testing for antibodies and thyroid function as well as a CBC and ordered a diagnostic ultrasound.  I will cancel my prior order for  ultrasound-guided biopsy.  If it is a simple colloid or other hemorrhagic type cyst I can drain it though it is likely to recur.  If however it does carry more suspicious soft tissue findings fine-needle aspiration biopsy by ultrasound guidance with possible molecular testing would be an appropriate next step.    Follow up pending results of imaging and labs in the course of the next 3 weeks.          Voice recognition software was used in the creation of this note/communication and any sound-alike errors which may have occurred from its use should be taken in context when interpreting.  If such errors prevent a clear understanding of the note/communication, please contact the office for clarification.

## 2025-02-27 NOTE — TELEPHONE ENCOUNTER
Called pt and let her know Dr. Huizar communicated with our Radiologists. Dr. Huizar would like pt to have the U/S Thyroid done first, depending on the results, the biopsy may or may not need to be scheduled for next week. Pt states that she can come tomorrow afternoon to have that done. Scheduled for 445 pm at [facility]. Thanks, Nisreen

## 2025-02-28 ENCOUNTER — HOSPITAL ENCOUNTER (OUTPATIENT)
Dept: RADIOLOGY | Facility: HOSPITAL | Age: 60
Discharge: HOME | End: 2025-02-28
Attending: OTOLARYNGOLOGY
Payer: COMMERCIAL

## 2025-02-28 DIAGNOSIS — Z87.798: ICD-10-CM

## 2025-02-28 DIAGNOSIS — D44.0 NEOPLASM OF UNCERTAIN BEHAVIOR OF THYROID GLAND: ICD-10-CM

## 2025-02-28 PROCEDURE — 76536 US EXAM OF HEAD AND NECK: CPT | Mod: 26,,, | Performed by: RADIOLOGY

## 2025-02-28 PROCEDURE — 76536 US EXAM OF HEAD AND NECK: CPT | Mod: TC

## 2025-03-03 ENCOUNTER — PATIENT MESSAGE (OUTPATIENT)
Dept: OTOLARYNGOLOGY | Facility: CLINIC | Age: 60
End: 2025-03-03
Payer: COMMERCIAL

## 2025-03-05 ENCOUNTER — OFFICE VISIT (OUTPATIENT)
Dept: PRIMARY CARE CLINIC | Facility: CLINIC | Age: 60
End: 2025-03-05
Payer: COMMERCIAL

## 2025-03-05 VITALS
DIASTOLIC BLOOD PRESSURE: 84 MMHG | RESPIRATION RATE: 18 BRPM | OXYGEN SATURATION: 99 % | HEART RATE: 76 BPM | BODY MASS INDEX: 38.5 KG/M2 | HEIGHT: 66 IN | SYSTOLIC BLOOD PRESSURE: 124 MMHG

## 2025-03-05 DIAGNOSIS — Z23 NEED FOR VACCINATION: ICD-10-CM

## 2025-03-05 DIAGNOSIS — Z00.00 ANNUAL PHYSICAL EXAM: ICD-10-CM

## 2025-03-05 DIAGNOSIS — Z76.89 ENCOUNTER TO ESTABLISH CARE: Primary | ICD-10-CM

## 2025-03-05 DIAGNOSIS — Z00.00 WELL WOMAN EXAM WITHOUT GYNECOLOGICAL EXAM: ICD-10-CM

## 2025-03-05 DIAGNOSIS — Z12.31 BREAST CANCER SCREENING BY MAMMOGRAM: ICD-10-CM

## 2025-03-05 DIAGNOSIS — Z12.11 COLON CANCER SCREENING: ICD-10-CM

## 2025-03-05 DIAGNOSIS — R74.01 ELEVATED AST (SGOT): ICD-10-CM

## 2025-03-05 PROCEDURE — 99999 PR PBB SHADOW E&M-EST. PATIENT-LVL V: CPT | Mod: PBBFAC,,, | Performed by: STUDENT IN AN ORGANIZED HEALTH CARE EDUCATION/TRAINING PROGRAM

## 2025-03-05 RX ORDER — TRAZODONE HYDROCHLORIDE 100 MG/1
1 TABLET ORAL DAILY
COMMUNITY

## 2025-03-05 NOTE — PROGRESS NOTES
"Subjective:       Patient ID: Nadia Saba is a 59 y.o. female.    Chief Complaint: Establish Care    HPI:  59 y.o. female presents to [facility] to establish care.    Acute concerns?: Patient has concerns for blood work.    Recent US of thyroid with concerns for nodule to neck.    Last PCP?: [facility]  Allergies: PCNs  Medical History: Fibromyalgia, osteoarthritis  Medications: albuterol 90 mcg/HFA PRN, gabapentin 300 mg bid, meloxicam 15 mg, trazodone 100 mg  Surgical History: adenoidectomy, bariatric surgery, c/s, cholecystectomy, D&C, gastrectomy, tonsillectomy, tubal ligation  Family History:   Social History: Non-smoker, doesn't drink EtOH    Has Gynecologist?: Doesn't have  Colonoscopy?: Did Cologuard in the past.  Mammogram?: Amenable to scheduling  Pneumococcal vaccine?: Believes receives [employer]  Flu vaccine?: Amenable    Review of Systems   Constitutional:  Positive for fatigue (Attributes to fibromyalgia). Negative for chills, diaphoresis and fever.   HENT:  Negative for congestion, sinus pressure, sneezing and sore throat.    Respiratory:  Negative for cough and shortness of breath.    Cardiovascular:  Negative for chest pain and palpitations.   Gastrointestinal:  Negative for abdominal pain, diarrhea, nausea and vomiting.   Musculoskeletal:  Positive for arthralgias (Has in knees and hands. Denies need for PT or imaging). Negative for joint swelling and myalgias.   Skin:  Negative for rash and wound.   Neurological:  Negative for dizziness and weakness.       Objective:      Vitals:    03/05/25 1432   BP: 124/84   BP Location: Right arm   Patient Position: Sitting   Pulse: 76   Resp: 18   SpO2: 99%   Height: 5' 6" (1.676 m)     Physical Exam  Vitals reviewed.   Constitutional:       General: She is not in acute distress.     Appearance: Normal appearance. She is not ill-appearing.   HENT:      Head: Normocephalic and atraumatic.        Comments: Nodular lesion to right inferior neck " along trachea  Eyes:      General:         Right eye: No discharge.         Left eye: No discharge.      Conjunctiva/sclera: Conjunctivae normal.   Cardiovascular:      Rate and Rhythm: Normal rate and regular rhythm.      Pulses: Normal pulses.      Heart sounds: No murmur heard.  Pulmonary:      Effort: Pulmonary effort is normal.      Breath sounds: Normal breath sounds.   Musculoskeletal:         General: No deformity.      Cervical back: Neck supple. No rigidity.   Lymphadenopathy:      Cervical: No cervical adenopathy.   Skin:     General: Skin is warm and dry.      Coloration: Skin is not jaundiced.   Neurological:      General: No focal deficit present.      Mental Status: She is alert and oriented to person, place, and time.   Psychiatric:         Mood and Affect: Mood normal.         Behavior: Behavior normal.             Lab Results   Component Value Date     02/27/2025    K 3.8 02/27/2025     02/27/2025    CO2 24 02/27/2025    BUN 18 02/27/2025    CREATININE 0.8 02/27/2025    ANIONGAP 12 02/27/2025     Lab Results   Component Value Date    HGBA1C 5.0 04/19/2022     Lab Results   Component Value Date    BNP 15 02/09/2022    BNP 17 09/21/2021    BNP 56 09/23/2013       Lab Results   Component Value Date    WBC 5.33 02/27/2025    HGB 13.0 02/27/2025    HCT 39.1 02/27/2025     02/27/2025    GRAN 3.0 02/27/2025    GRAN 56.5 02/27/2025     Lab Results   Component Value Date    CHOL 191 02/09/2022    HDL 69 02/09/2022    LDLCALC 102.4 02/09/2022    TRIG 98 02/09/2022        Current Medications[1]        Assessment:       1. Encounter to establish care    2. Elevated AST (SGOT)    3. Well woman exam without gynecological exam    4. Colon cancer screening    5. Breast cancer screening by mammogram    6. Need for vaccination           Plan:       Encounter to establish care  Annual physical exam  - Health maintenance items reviewed today's visit    Elevated AST (SGOT)  -     Hepatitis Panel,  Acute; Future; Expected date: 03/05/2025    Well woman exam without gynecological exam  -     Ambulatory referral/consult to Gynecology; Future; Expected date: 03/12/2025    Colon cancer screening  -     Cologuard Screening (Multitarget Stool DNA); Future; Expected date: 03/05/2025    Breast cancer screening by mammogram  -     Mammo Digital Screening Bilat; Future; Expected date: 06/01/2025    Need for vaccination  -     influenza (Flulaval, Fluzone, Fluarix) 45 mcg/0.5 mL IM vaccine (> or = 6 mo) 0.5 mL    Reassured patient that thyroglobulin tumor marker is only elevated if comparing this value to someone who has has their thyroid removed. Reassured her that she is within reference range in population with thyroid in-tact.    RTC in 1 year           [1]   Current Outpatient Medications:     albuterol (VENTOLIN HFA) 90 mcg/actuation inhaler, INHALE TWO PUFFS BY MOUTH EVERY 6 HOURS AS NEEDED, Disp: 18 g, Rfl: 0    gabapentin (NEURONTIN) 300 MG capsule, Take 1 capsule (300 mg total) by mouth 2 (two) times daily., Disp: 90 capsule, Rfl: 1    meloxicam (MOBIC) 15 MG tablet, Take 1 tablet (15 mg total) by mouth once daily., Disp: 30 tablet, Rfl: 1    traZODone (DESYREL) 100 MG tablet, Take 1 tablet by mouth once daily., Disp: , Rfl:     Current Facility-Administered Medications:     influenza (Flulaval, Fluzone, Fluarix) 45 mcg/0.5 mL IM vaccine (> or = 6 mo) 0.5 mL, 0.5 mL, Intramuscular, 1 time in Clinic/HOD,

## 2025-03-06 ENCOUNTER — OFFICE VISIT (OUTPATIENT)
Dept: OTOLARYNGOLOGY | Facility: CLINIC | Age: 60
End: 2025-03-06
Payer: COMMERCIAL

## 2025-03-06 ENCOUNTER — RESULTS FOLLOW-UP (OUTPATIENT)
Dept: PRIMARY CARE CLINIC | Facility: CLINIC | Age: 60
End: 2025-03-06

## 2025-03-06 VITALS
HEIGHT: 67 IN | HEART RATE: 67 BPM | DIASTOLIC BLOOD PRESSURE: 78 MMHG | BODY MASS INDEX: 37.61 KG/M2 | SYSTOLIC BLOOD PRESSURE: 129 MMHG | RESPIRATION RATE: 16 BRPM | WEIGHT: 239.63 LBS

## 2025-03-06 DIAGNOSIS — R09.89 CHRONIC THROAT CLEARING: ICD-10-CM

## 2025-03-06 DIAGNOSIS — Z87.798: ICD-10-CM

## 2025-03-06 DIAGNOSIS — E04.2 MULTINODULAR THYROID: Primary | ICD-10-CM

## 2025-03-06 PROCEDURE — 99999 PR PBB SHADOW E&M-EST. PATIENT-LVL IV: CPT | Mod: PBBFAC,,, | Performed by: OTOLARYNGOLOGY

## 2025-03-06 NOTE — PATIENT INSTRUCTIONS
THROAT CLEARING     Why am I clearing my throat so often?   Irritation of the vocal folds and surrounding area can cause the urge to clear your throat. This irritation can be caused by acid reflux, allergies, or environmental factors, as well as from a cold or sore throat. Talk with your doctor about the treatment of possible underlying causes. However, throat clearing can turn into a cycle. You clear your throat after feeling an irritation, which causes more irritation, which causes you to continue clearing your throat, which causes more irritation.     What can I do about it?   Ask a friend or family member to help you keep track - you may not even realize how often you do it.   Replace the throat clearing with a different behavior.   Take a sip of water and then swallow. Repeat until the sensation subsides.  Swallow hard (even without water)   Use the silent throat clear method by making the h sound when you exhale  Breathe in deeply through your nose and exhale on shhh   Hum quietly   Keep yourself hydrated.   Drink plenty of water   Eat wet snacks (grapes, apples, melon, cucumber)   Use a humidifier at home or at work   Suck on hard candies, but avoid too much sugar and avoid anything with mint, menthol, camphor, or eucaplyptus, as these will have a more irritating effect.        VOCAL HYGIENE AND HYDRATION RECOMMENDATIONS     DO   Drink plenty of waterabout  oz a day! If your urine is pale, you should be well-hydrated.  Try some of these tips to increase hydration as well.  Eat wet snacks such as grapes, melon, cucumbers, apple slices   Reduce intake coffee and other caffeinated and carbonated beverages   Suck on pastille lozenges or sugar free candies (not mentholated lozenges)   Use a room or personal humidifier   Use sinus rinses/irrigations or nasal saline spray   Inhale steam for a few minutes before speaking or singing   Pay attention to how, and how much, you use your voice.   Give  yourself vocal breaks throughout the day.   Breathe when talking, take frequent pauses for breath.   Use a microphone when speaking in front of a group of 15 or more to reduce voice strain.   Learn how to use your voice correctly to get loud with voice therapy techniques.  Stay healthy. Eat right and get plenty of rest. Follow a reflux precaution diet if indicated.   ____________________________________________________________________    REDUCE   Loud talking, yelling, cheering, or screaming. Voice injury can take place over a long time, or all at once.  If you need to talk loud or yell, be sure you are doing it properly.   Clearing your throat and forceful coughing. The vocal folds collect mucus when irritated or inflamed and this can cause the urge to clear your throat. The trauma of clearing the throat creates more inflammation and mucus. See tips above for keeping hydrated to assist with cutting back on throat clearing.  Instead of clearing your throat, try these behaviors until the sensation passes:   Take a sip of water   Silently clear with a hard exhale making an hhh sound   Swallow hard   Drying agents such as anti-histamines or decongestant medications. You should always take medications as prescribed, but keep in mind these will dry you out, so increase your hydration!   ____________________________________________________________________    AVOID   Inhalant irritants such as smoke, strong fumes, and allergens. Take advantage of 1-800-QUIT-NOW if you are ready to stop smoking.   Unnecessary non-speech noises, such as grunting during exercise, loud noises, or excessively high- or low-pitched sounds. Save your voice for talking and singing!   Whispering. Whispering places your vocal folds in a tenser position than usual.           WHAT TO EXPECT FROM VOICE THERAPY    Purpose  The purpose of voice therapy is to help you find a better, more efficient way to use your voice or to reduce symptoms such as  coughing, throat clearing, or difficulty breathing.  Depending on your symptoms, you may learn how to produce clearer voice quality, how to reduce fatigue or pain associated with speaking, how to take care of your voice, and how to eliminate chronic coughing or throat clearing.      Process: Evaluation  First, you may go through some initial testing.  In most cases, a videostroboscopy will be performed in order to allow your speech pathologist and your physician to look at your vocal cords to aid in deciding if you would benefit from voice therapy.  Next, you may work with the speech pathologist to assess the current capabilities of your voice.  Following evaluation, your speech pathologist will design a therapeutic plan to improve your voice as well as other symptoms that may bother you.  At the time of evaluation, your speech pathologist may provide you with exercises to try at home.      Process: Therapy  Most patients benefit from 2-8 sessions over 1-3 months.  Voice therapy involves changing the behavior of your vocal cords and speaking habits, so it is very important that you attend your appointments and do home practices as instructed by your speech pathologist.  Home practice and participation in therapy are critical to meeting your desired voice goals, so of course, we are able to work with you to schedule appointments that are convenient for you.        ACID REFLUX   What is acid reflux?    When we eat, food passes from the throat and into the stomach through a tube called the esophagus. At the bottom of the esophagus is a ring of muscles that acts as a valve between the esophagus and stomach, called the lower esophageal sphincter. Smoking, alcohol, and certain types of food may weaken the sphincter, so it may stop closing properly. The contents in the stomach then may leak back, or reflux, into the esophagus. This problem is called gastroesophageal reflux disease (GERD). Symptoms of GERD include  heartburn, belching, regurgitation of stomach contents, and swallowing difficulties.    Sometimes, the stomach acid travels up through the esophagus and spills into the larynx or pharynx (voice box). This is called laryngopharyngeal reflux (LPR) and is irritating to the vocal folds and surrounding tissues. Often, patients with LPR do not experience heartburn as a symptom. More commonly, symptoms of LPR include hoarseness, excessive mucous resulting in frequent throat clearing, post-nasal drip, coughing, throat soreness or burning, choking episodes, difficulty swallowing, and sensation of a lump in the throat.     How is acid reflux treated?   Treatment for acid reflux can involve any combination of medication, lifestyle modifications, and surgery.   Medications. Your doctor may prescribe a proton pump inhibitor (PPI) or an H2 blocker. If you are prescribed a PPI, take in on an empty stomach in the morning 30 minutes prior to eating breakfast. Keep in mind that it may take 4-6 weeks before symptoms begin to resolve, so do not stop medications without consulting your doctor.   Lifestyle and dietary modifications. Eat smaller meals at a slower pace. Avoid over-eating. If you are overweight, try to lose weight. Do not lie down or exercise directly after eating; eat your last meal of the day at least 2-3 hours prior to going to sleep. Avoid tight-fitting clothes. If you are a smoker, reduce or quit smoking. Elevate your head of bed 4-6 inches by putting phone books under the legs at the head of your bed or buy a wedge pillow, but do not use more than two regular pillows as this causes the body to curl and compresses your stomach.     Food group Foods to avoid to reduce reflux   Beverages  Whole milk, 2% milk, chocolate milk/hot chocolate, alcohol, coffee (regular and decaf), caffeinated tea, mint tea, carbonated beverages, citrus juice    Breads/grains Commercial sweet rolls, doughnuts, croissants, and other high-fat  pastries    Fruits and vegetables Fried or cream-style vegetables, tomatoes, tomato-based products, citrus fruits, hot peppers    Soups and seasonings Cream, cheese, tomato-based soups, vinegar    Meats and proteins Fatty or fried meat/fish, hernandez, sausage, pepperoni, lunch meat, fried eggs    Fats and oil Lard, hernandez drippings, salt pork, meat drippings, gravies, highly seasoned salad dressings, nuts    Sweets/desserts Anything made with or from chocolate, peppermint, spearmint, whole milk, or cream; high-fat pastries, gum, hard candy         NASAL SALINE    Still saline comes in many preparations including sprays/mists, gels, and rinses.  Different preparations served different purposes.  Saline spray helps to briefly moisturize the nose and help clear mucus.  Saline gels coat the nose for longer protective benefit of keeping the linings the nose moist.  Saline rinses clear the nose and sinuses and a more thorough way in her best used for significant postnasal drip and sinus complaints.  A combination of saline sprays/mists, gels and rinses should be used to address routine nasal clearing and dryness issues as well as flushing for better control of allergy and postnasal drip symptoms.  There is no real risk of over use of nasal saline products.  Saline sprays do not have any of the potential rebound or addiction of nasal decongestant sprays.  Nasal saline sprays and rinses should be used prior to the application of any medicated nasal sprays such as nasal steroids or nasal antihistamine sprays.        INTRANASAL STEROID SPRAYS      Intranasal steroid sprays are available both by prescription and over-the-counter both in generic and brand name preparations.  They are all very similar in efficacy and side effect profiles.  Over-the-counter and prescription intranasal steroids include fluticasone propionate (Flonase), fluticasone furoate (Sensimist), triamcinolone (Nasacort), and budesonide (Rhinocort).  While  these medications are available as prescriptions as well there are few nasal steroids in her available by prescription only and include mometasone (Nasonex), flunisolide (Nasarel), and beclomethasone (QNASL).    Nasal steroids or the foundation of treatment of both allergy and other inflammatory conditions of the nose and sinuses.  They are safe for regular use and while there are many side effects listed most of these are steroid class effects and not typically encountered.  Typical side effects include dryness and even ulceration and bleeding of the nose.  These side effects can be minimized by proper application and proper moisturization with saline and saline gel.    Sometimes changing between 1 brand of nasal steroid and another can result in improved control of symptoms especially after long term use of one particular nasal steroid.    Proper application of the nasal steroid spray is accomplished by spraying towards the I/ear on the same side with the tip of the superior just barely inside the nostril with the chin slightly downward.  Any dripping should be gently inhaled not sniff test backwards into the throat.  Labeled instructions should be followed.        ASTELIN (Azelastine) nasal spray    Astelin is a topical nasal antihistamine which can be of additional benefit in controlling nasal allergy and postnasal drip.  Typically is recommended on an as-needed basis 1-2 sprays each nostril twice daily.  People often find it beneficial at night.  This typically added to her regimen of saline and nasal steroids as a 3rd line agent for as needed use.  Excessive use can cause excessive dryness and even nose bleeds.  It has a very strong taste which many people find intolerable.  Astelin needs to be stopped 5-7 days prior to any skin allergy testing just like oral antihistamines as it will inhibit the skin response.      SINUS RINSE INSTRUCTIONS    Nasal Saline Irrigation Instructions  You can wash your nasal and  sinus passages using nasal saline (salt water) irrigation. This   is simple and effective. Follow the instructions below, as well as the ones provided by your   physician.  Supplies  First, you will need a nasal saline irrigation bottle and rinse solution.   You can purchase nasal rinse kits that include these items (such as   NeilMed®, Ayr®, Simply Saline®, Ocean Complete®) at most drug   stores. You can also make your own saline irrigation solution by   adding kosher (non-iodine) salt and baking soda to distilled water.   Your physician may tell you to add medications like a steroid or   antibiotic to the rinse as needed.  Steps for nasal irrigation  Step 1. Fill the bottle  ? Wash your hands.  ? Fill the irrigation bottle with lukewarm distilled water or boiled water that has cooled.  Step 2. Mix the solution  ? Put the saline and salt packet contents into the bottle.  ? Tighten the top of the bottle and shake it gently to dissolve the mixture.  ? If you are making your own solution:   - Add 1/4 to 1/2 teaspoon of baking soda and 1/8 teaspoon of kosher (non-iodine) salt   into the bottle.   - Tighten the top of the bottle.   - Shake the bottle gently to dissolve the mixture.  Step 3. Get into position  ?  front of the sink.  ? Unless you were instructed to use another position, bend forward.   Then tilt your face down about 45 degrees so that you are looking   down into the sink.  ? Gently place the spout of the saline bottle against 1 of your nostrils.  [facility]  CARE AND TREATMENT  Patient Education  ©2018 [employer]  ©2018 [employer]  Step 4. Rinse  ? Breathing through your mouth, gently squeeze the   bottle. This will squirt the solution into your nostril. The   solution will start to drain from the other nostril. Some   may drain from your mouth. This is normal.  ? Use 2 ounces (half of the bottle) on each nostril.  ? Afterwards, you may  need to blow your nose gently to   help drain any solution that is left behind.  Step 5. Repeat  ? Repeat steps 3 and 4 with the other nostril.  You can watch a video to learn how to do nasal saline irrigation. Go to [email or web address] and   search for NeilMed Sinus Rinse.  Step 6.  Clean the bottle and cap. Air dry the Sinus Rinse bottle, cap, and tube on a clean paper towel, a lint free towel, or use NeilMed® NasaDOCK® or NasaDOCK plus (sold separately) to store the bottle, cap and tube.  Please read Warnings before using.  Our recommendation is to replace the bottle every three months.      NEILMED CLEAING INSTRUCTIONS    It is very important to keep these devices clean and free from any contamination. Replace the bottle every 3 months.  NasaDock Plus  NasaDock NeilMed® SINUS RINSE Squeeze Bottle: Please perform routine inspections of the bottle and tube for any discolorations and cracks. If there are any visual signs of deterioration or permanent color changes, please clean thoroughly. If the discolorations remain after cleansing, discard the items and purchase new ones. Please follow these instructions after each use of the product. Be sure to replace your product after three months.  Step 1: Rinse the cap, tube and bottle using running water. Fill the bottle with distilled, micro-filtered (through 0.2 micron), reverse osmosis filtered, commercially bottled or previously boiled and cooled down water at lukewarm or body temperature..  Step 2: Add a few drops of dish washing liquid or baby shampoo.  Step 3: Attach the cap and tube to the bottle; hold your finger over the opening in the cap and shake the bottle vigorously.  Step 4: Squeeze the bottle hard to allow the soapy solution to clean the interior of the tube and the cap. Empty out the bottle completely.  Step 5: Rinse the soap from the bottle, cap and tube thoroughly and place the items on a clean paper towel to dry or use the preferred NasaDOCK® or NasaDOCK  plus.    The NasaDOCK® is a simple, hygienic way to dry and store the SINUS RINSE bottle, cap and tube. NasaDOCK® comes with various hanging options and is available in different colors. Our newest model also offers storage for our SINUS RINSE mixture packets. We strongly suggest using NasaDOCK® as an inexpensive, easy way to dry the cap, tube and SINUS RINSE bottle.        Cleaning:  Do not use a  to clean the inside of a bottle. While our bottle is  safe, a  will not adequately clean the SINUS RINSE bottle. The water jets in dishwashers cannot enter the narrow neck of the bottle, and portions of the bottles interior will not be cleaned thoroughly. Additional methods of cleaning the bottle include the use of concentrated white vinegar or isopropyl alcohol (70% concentration), followed by scrubbing and rinsing as described above.       Microwave Disinfection  Clean the device with soap and water as mentioned above and shake off the excess water. Now place the bottle, cap and tube in the microwave for 40 seconds. This will disinfect the bottle, cap and tube. If the microwave has been used recently, please make sure that the inside of the microwave has cooled back down to room temperature before using it to disinfect the bottle.    NeilMed NasaFlo® Neti Pot Users:  Use the same procedure as above.    Sinugator® Cleaning Directions:  Clean the Sinugator® by running plain water and dry with a clean lint free towel and then air dry the unit by keeping it open to the air. The nasal  tip, blue reservoir and white soft tube can be disinfected by cleaning with soap and water and shaking off the excess water before placing in the home microwave for 60 seconds. Clean the entire unit with a few drops of dishwashing liquid and water every three days to keep the unit clean. As a fi nal rinse to wash off any residual soap or tap water, use either distilled, micro-filtered  (through 0.2 micron filter), commercially bottled or previously boiled & cooled down water. Please make sure to rinse thoroughly during each wash so no soap is left behind. DO NOT place the white motor unit in microwave for disinfection. Because of the units stainless steel components, this can cause damage or fire hazards.    General Principles of Maintenance & Storage:  When permissible use a microwave periodically to disinfect devices. Always store NeilMed® products in a cool and dry place with adequate ventilation. NasaDOCK® or NasaDOCK plus offer a simple hygienic way to air dry & neatly store the bottle, cap, tube and NasaFlo. Do not store the bottle with the cap screwed on, unless both are dry. Do not store the wet parts in a sealed plastic bag. If you travel before they are dry, wrap parts separately in paper towels. Hand soap or shampoo can be used for cleaning parts while away from home.        USE ONLY AS DIRECTED, IF SYMPTOMS PERSIST SEE YOUR DOCTOR/HEALTHCARE PROFESSIONAL. ALWAYS READ THE LABEL.

## 2025-03-06 NOTE — PROGRESS NOTES
[facility]    Subjective:      Patient: Nadia Saba Patient PCP: Aliyah Lopez         :  1965     Sex:  female      MRN:  [id number]          Date of Visit: 2025      Chief Complaint: Results (Discuss U/S results.  No other ENT complaints at this time. )      Patient ID: Nadia Saba is a 59 y.o. female       2025 follow up visit Patient seen for initial consultation just one-week ago with sudden onset enlargement right of midline thyroid bed growth with a prior history of thyroglossal duct excision in childhood but no other risk factors for cancer (told that there was malignancy in the thyroglossal duct cyst).  Initial ultrasound imaging was uncertain solid versus cystic.  Dedicated thyroid ultrasound imaging completed as well as laboratory testing.  Some mild elevation of thyroglobulin all other testing negative.  Dedicated ultrasound determine the sudden enlargement to be more consistent with a colloid cyst.  Subsequently patient has not had any change.  No pain or rapid enlargement.  No voice changes or dysphagia.    TGAB screen negative, thyroglobulin level 23, CBC normal, TPO AB normal, TSH normal at 0.764    2025 US THYROID     CLINICAL HISTORY:  Neoplasm of uncertain behavior of thyroid gland     TECHNIQUE:  Ultrasound of the thyroid and cervical lymph nodes was performed.     COMPARISON:  None.     FINDINGS:  The right thyroid lobe measures 5.3 x 2.2 x 2.0 cm.  The left thyroid lobe measures 4.8 x 1.7 x 1.5 cm.  The total thyroid weight is 18.4 g.  There is a 26 mm circumscribed, wider than tall, mixed solid and cystic, anechoic nodule present medially in the right thyroid midpole which shows internal inspissated colloid (TI-RADS 2).  There is a 12 x 10 x 10 mm smooth, circumscribed, wider than tall, isoechoic solid nodule present in the right thyroid midpole which shows no associated calcifications.  There is a 16 x 11 x 10 mm smooth, circumscribed, wider than tall,  isoechoic solid nodule present within the junction of the mid and upper pole of the left thyroid lobe (TI-RADS 3).  There is a 6 x 6 x 3 mm smooth, circumscribed, wider than tall, isoechoic solid nodule present in the lower pole of the left thyroid lobe (TI-RADS 3).     No pathologically enlarged or morphologically abnormal lymph nodes in the left or right neck adjacent to the thyroid fossa.     Impression:     Multinodular thyroid gland.  No thyroid nodules which meet the criteria for FNA/core biopsy.  No concerning lymphadenopathy in the left or right neck adjacent to the thyroid fossa.        Electronically signed by:Harshad Elizalde MD      02/27/2025 initial patient consultation Patient is a  lifelong NON-smoker with a past medical history of headaches, depression, fibromyalgia self-referred for findings on soft tissue ultrasound 02/25/2025.  No prior subsequent dedicated thyroid ultrasound.  No cross-sectional imaging of the head and neck.  Did have an upper GI done in 2022 for obesity revealing a duodenal diverticulum and small non sliding hiatal hernia.  Images reviewed do not show any upper esophageal cricopharyngeal dysfunction or diverticulum.  Symptomatically patient complains of chronic throat clearing and feels somewhat choked with drier foods such as bread.  No sore throat, hemoptysis, otalgia, weight loss or voice change.    02/25/2025 US SOFT TISSUE HEAD NECK     CLINICAL HISTORY:  Localized swelling, mass and lump, neck     TECHNIQUE:  Ultrasound of the neck was performed     COMPARISON:  None     FINDINGS:  Region of concern demonstrates a complex cystic lesion in the region of the right thyroid lobe measuring 2.8 x 1.7 x 2.5 cm and containing punctate internal echogenic foci and small peripheral nodular soft tissue.  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  Note additional calcifications in the suspected adjacent thyroid gland.  Recommend further evaluation with  dedicated thyroid ultrasound.     Impression:     As above.        Electronically signed by:Be Sánchez    2022 upper GI            Labs:  WBC   Date Value Ref Range Status   2025 5.33 3.90 - 12.70 K/uL Final     Hemoglobin   Date Value Ref Range Status   2025 13.0 12.0 - 16.0 g/dL Final     Platelets   Date Value Ref Range Status   2025 254 150 - 450 K/uL Final     Creatinine   Date Value Ref Range Status   2025 0.8 0.5 - 1.4 mg/dL Final     TSH   Date Value Ref Range Status   2025 0.764 0.400 - 4.000 uIU/mL Final     Hemoglobin A1C   Date Value Ref Range Status   2022 5.0 4.0 - 5.6 % Final     Comment:     ADA Screening Guidelines:  5.7-6.4%  Consistent with prediabetes  >or=6.5%  Consistent with diabetes    High levels of fetal hemoglobin interfere with the HbA1C  assay. Heterozygous hemoglobin variants (HbS, HgC, etc)do  not significantly interfere with this assay.   However, presence of multiple variants may affect accuracy.         Past Medical History  She has a past medical history of Arthritis, BMI 37.0-37.9, adult, Fibromyalgia, Obesity, and Osteoarthritis.    Family / Surgical / Social History  Her family history includes Arthritis in her father and mother; Asthma in her mother; COPD in her father; Heart disease in her father and mother; Obesity in her mother and sister.    Past Surgical History:   Procedure Laterality Date    ADENOIDECTOMY      APPENDECTOMY      BARIATRIC SURGERY  2017     SECTION      CHOLECYSTECTOMY      DILATION AND CURETTAGE OF UTERUS      GASTRECTOMY  2017    LAPAROSCOPIC CHOLECYSTECTOMY WITH CHOLANGIOGRAPHY  2019    LAPAROSCOPIC CHOLECYSTECTOMY WITH CHOLANGIOGRAPHY N/A 2019    Procedure: CHOLECYSTECTOMY, LAPAROSCOPIC, WITH CHOLANGIOGRAM;  Surgeon: Eladio Dawson MD;  Location: [facility];  Service: General;  Laterality: N/A;    TONSILLECTOMY      TUBAL LIGATION         Social History     Tobacco Use     "Smoking status: Never    Smokeless tobacco: Never   Substance and Sexual Activity    Alcohol use: No    Drug use: No    Sexual activity: Yes     Partners: Male     Birth control/protection: None       Medications  She has a current medication list which includes the following prescription(s): albuterol, gabapentin, meloxicam, and trazodone.      Allergies  Review of patient's allergies indicates:   Allergen Reactions    Pcn [penicillins] Other (See Comments)     Doesn't know reaction       All medications, allergies, and past history have been reviewed.    Objective:      Vitals:      2/27/2025    11:12 AM 3/5/2025     2:32 PM 3/6/2025     3:11 PM   Vitals - 1 value per visit   SYSTOLIC 116 124 129   DIASTOLIC 74 84 78   Pulse 75 76 67   Resp  18 16   SPO2  99 %    Weight (lb) 238.54  239.64   Weight (kg) 108.2  108.7   Height 5' 6" (1.676 m) 5' 6" (1.676 m) 5' 7" (1.702 m)   BMI (Calculated) 38.5  37.5   Pain Score Zero Zero Zero       Body surface area is 2.27 meters squared.    Physical Exam:    GENERAL  APPEARANCE -  alert, appears stated age, and cooperative  BARRIER(S) TO COMMUNICATION -  none VOICE - appropriate for age and gender    INTEGUMENTARY  no suspicious head and neck lesions    HEENT  HEAD: Normocephalic, without obvious abnormality, atraumatic  FACE: INSPECTION - Symmetric, no signs of trauma, no suspicious lesion(s)      STRENGTH - facial symmetry intact     PALPATION -  No masses     SALIVARY GLANDS - non-tender with no appreciable mass    NECK/THYROID:  Tethering from mid horizontal neck incision from prior thyroglossal duct excision.  Left thyroid lobe was not particularly palpable right thyroid lobe medially is full and proximally 2.5-3 cm normal overlying skin but mildly irritating when palpated, not specifically tender, non fluctuant though soft.  Does not move with swallow per se.    EYES  Normal occular alignment and mobility with no visible nystagmus at rest    CHEST AND LUNG   INSPECTION " & AUSCULTATION - normal effort, no stridor    NEUROLOGIC  MENTAL STATUS - alert, interactive CRANIAL NERVES - normal    LYMPHATIC  HEAD AND NECK - non-palpable; SUPRACLAVICULAR - deferred      Procedure(s):  None          Assessment:      Problem List Items Addressed This Visit    None  Visit Diagnoses         Multinodular thyroid    -  Primary      History of removal of thyroglossal duct cyst          Chronic throat clearing                       Plan:      No suspicion of malignancy based on ultrasound appearance.  Offered a possible decompressive aspiration in the office today verses 1st of 2 required benign biopsies by ultrasound guidance prior to performing ablation for benign nodule as well as consideration of a 2nd opinion with Dr. Dakota Bolden for elective thyroidectomy for multinodular thyroid disease.      Patient expressed concern of awareness of tickle and clearing and cough sense identifying the mass of the pretracheal right thyroid well below the cricothyroid membrane.  I have counseled her that it not believe they are directly connected given the location of the mass and that is surgery may in fact create greater neural irritation of the larynx.      I have provided her information on throat-clearing, reflux, postnasal drip and vocal and throat hygiene as well as a referral to speech therapy.  I have recommended follow up for further evaluation if these symptoms are not improve with these conservative measures for consideration of further evaluation including CT imaging, transnasal flexible laryngoscopy, etc. and treatment of any underlying causes such as LPR, chronic sinusitis, lung disease, or sensory neuropathy of the larynx which are more likely causes than the multinodular thyroid.      Patient will consider all of her options and return for routine follow up.  I have strongly encouraged an annual thyroid ultrasound surveillance for now and this is ordered today.    TIME:  >30-minute total  visit / care coordination time 50% minutes spent face to face including counseling with patient and .        Voice recognition software was used in the creation of this note/communication and any sound-alike errors which may have occurred from its use should be taken in context when interpreting.  If such errors prevent a clear understanding of the note/communication, please contact the office for clarification.

## 2025-03-17 ENCOUNTER — OFFICE VISIT (OUTPATIENT)
Dept: OBSTETRICS AND GYNECOLOGY | Facility: CLINIC | Age: 60
End: 2025-03-17
Payer: COMMERCIAL

## 2025-03-17 VITALS
DIASTOLIC BLOOD PRESSURE: 74 MMHG | SYSTOLIC BLOOD PRESSURE: 108 MMHG | HEART RATE: 71 BPM | WEIGHT: 237.31 LBS | HEIGHT: 67 IN | BODY MASS INDEX: 37.25 KG/M2

## 2025-03-17 DIAGNOSIS — Z00.00 WELL WOMAN EXAM WITHOUT GYNECOLOGICAL EXAM: ICD-10-CM

## 2025-03-17 DIAGNOSIS — Z12.4 CERVICAL CANCER SCREENING: Primary | ICD-10-CM

## 2025-03-17 PROCEDURE — 99999 PR PBB SHADOW E&M-EST. PATIENT-LVL III: CPT | Mod: PBBFAC,,, | Performed by: STUDENT IN AN ORGANIZED HEALTH CARE EDUCATION/TRAINING PROGRAM

## 2025-03-17 PROCEDURE — 87624 HPV HI-RISK TYP POOLED RSLT: CPT | Performed by: STUDENT IN AN ORGANIZED HEALTH CARE EDUCATION/TRAINING PROGRAM

## 2025-03-17 PROCEDURE — 88175 CYTOPATH C/V AUTO FLUID REDO: CPT | Performed by: STUDENT IN AN ORGANIZED HEALTH CARE EDUCATION/TRAINING PROGRAM

## 2025-03-17 NOTE — PROGRESS NOTES
"HPI: This is a 59 y.o.    who presents for routine annual exam.   New patient to me    Hx PMB in 2017, underwent hysteroscopic polypectomy with Dr Huerta and results were benign.    Cervical cancer screening: overdue   Most recent:  NILM/HR HPV neg    History abnormal: no     Breast cancer screening: UTD, TC 5.31   Colon cancer screening: overdue     Family history breast cancer: no  Family history ovarian cancer: no  Family history colon cancer: no      OB History    Para Term  AB Living   4 3 3   3   SAB IAB Ectopic Multiple Live Births       3      # Outcome Date GA Lbr Angel/2nd Weight Sex Type Anes PTL Lv   4      M CS-Unspec   ARABELLA   3 Term     M Vag-Spont   ARABELLA   2 Term     F Vag-Spont   ARABELLA   1 Term                   /74 (Patient Position: Sitting)   Pulse 71   Ht 5' 7" (1.702 m)   Wt 107.6 kg (237 lb 5.2 oz)   LMP 2013   BMI 37.17 kg/m²      PE:   APPEARANCE: Well nourished, well developed, no acute distress.  NODES: no inguinal lymphadenopathy  BREASTS: Symmetrical, no skin changes or visible lesions. No palpable masses, nipple discharge or adenopathy bilaterally.  ABDOMEN: Soft. No tenderness or masses. No distention.   VULVA: No lesions. Normal external female genitalia.  URETHRAL MEATUS: Normal size and location, no lesions, no prolapse.  URETHRA: No masses, tenderness, or prolapse.  VAGINA: Moist. No lesions or lacerations noted. No abnormal discharge present. No odor present.   CERVIX: No lesions or discharge. No cervical motion tenderness.   UTERUS: Normal size, regular shape, mobile, non-tender.  ADNEXA: No tenderness. No fullness or masses palpated in the adnexal regions.   ANUS PERINEUM: Normal.      Diagnosis:  1. Cervical cancer screening    2. Well woman exam without gynecological exam        Plan:     Nadia Dexter" was seen today for well woman.    Diagnoses and all orders for this visit:    Cervical cancer screening  -     " Liquid-Based Pap Smear, Screening  -     HPV High Risk Genotypes, PCR    Well woman exam without gynecological exam  -     Ambulatory referral/consult to Gynecology      Patient has her cologuard kit at home and plans to complete this week.     Patient was counseled today on the current  ACS guidelines for cervical cytology screening as well as the current recommendations for breast cancer screening.   She was counseled to follow up with her PCP for other routine health maintenance.        RTC 1 year or sooner VICKY Perkins MD  Obstetrics & Gynecology   [facility]

## 2025-03-19 LAB
FINAL PATHOLOGIC DIAGNOSIS: NORMAL
Lab: NORMAL

## 2025-03-20 ENCOUNTER — RESULTS FOLLOW-UP (OUTPATIENT)
Dept: OBSTETRICS AND GYNECOLOGY | Facility: CLINIC | Age: 60
End: 2025-03-20

## 2025-03-20 LAB
HPV HR 12 DNA SPEC QL NAA+PROBE: NEGATIVE
HPV16 AG SPEC QL: NEGATIVE
HPV18 DNA SPEC QL NAA+PROBE: NEGATIVE

## 2025-03-25 ENCOUNTER — OFFICE VISIT (OUTPATIENT)
Dept: OTOLARYNGOLOGY | Facility: CLINIC | Age: 60
End: 2025-03-25
Payer: COMMERCIAL

## 2025-03-25 VITALS
HEART RATE: 74 BPM | SYSTOLIC BLOOD PRESSURE: 131 MMHG | DIASTOLIC BLOOD PRESSURE: 80 MMHG | BODY MASS INDEX: 37.72 KG/M2 | WEIGHT: 240.88 LBS

## 2025-03-25 DIAGNOSIS — E04.2 MULTIPLE THYROID NODULES: Primary | ICD-10-CM

## 2025-03-25 PROCEDURE — 3008F BODY MASS INDEX DOCD: CPT | Mod: CPTII,S$GLB,, | Performed by: OTOLARYNGOLOGY

## 2025-03-25 PROCEDURE — 3075F SYST BP GE 130 - 139MM HG: CPT | Mod: CPTII,S$GLB,, | Performed by: OTOLARYNGOLOGY

## 2025-03-25 PROCEDURE — 1159F MED LIST DOCD IN RCRD: CPT | Mod: CPTII,S$GLB,, | Performed by: OTOLARYNGOLOGY

## 2025-03-25 PROCEDURE — 3079F DIAST BP 80-89 MM HG: CPT | Mod: CPTII,S$GLB,, | Performed by: OTOLARYNGOLOGY

## 2025-03-25 PROCEDURE — 99214 OFFICE O/P EST MOD 30 MIN: CPT | Mod: S$GLB,,, | Performed by: OTOLARYNGOLOGY

## 2025-03-25 PROCEDURE — 99999 PR PBB SHADOW E&M-EST. PATIENT-LVL III: CPT | Mod: PBBFAC,,, | Performed by: OTOLARYNGOLOGY

## 2025-03-25 NOTE — ASSESSMENT & PLAN NOTE
Nadia Saba is a 59 y.o. female with a multinodular goiter. She has a chronic cough, some PND, and dysphagia that localizes more inferiorly than the thyroid. Her ultrasound demonstrates bilateral nodules without suspicious characteristics, and no nodules met criteria for FNA. We discussed how thyroid surgery carries risks, and that it is unlikely to fix her nonspecific symptoms. Without a symptomatic goiter or suspicious nodules, there is no indication currently for surgery. I therefore recommended observation with repeat ultrasound in about a year. I stressed that things can change over time, so that this recommendation and assessment may have to change as data and information change. She could be a candidate for thyroid nodule ablation with Dr. Johnson or Dr. Novoa.    She may follow up as needed. Time was allowed for questions, and all questions were answered to her apparent satisfaction.

## 2025-03-25 NOTE — PROGRESS NOTES
"[facility]    Subjective:       Patient ID: Nadia Saba is a 59 y.o. female.    Chief Complaint:  Thyroid nodules    HPI    Nadia Saba is a 59 y.o. female who presents with a roughly one month history of a multinodular goiter. She was scratching her neck with her neck turned and noted a right-sided nodule. She has a history of a thyroglossal duct cyst that was excised when she was 2, and she was told that it was "cancer" - she does not recall additional treatment of anything. She also notes that she has a chronic, hacking cough, along with dysphagia to bread/potatoes/rice. She thinks it hangs up, and localizes this to her sternal notch.    There is no family history of thyroid cancer, and she has no history of head and neck radiation therapy. She had been on thyroid medication when she was younger, but she has stopped it.     She is a never-smoker. She works in the [employer] business.     Past Medical History:   Diagnosis Date    Arthritis     BMI 37.0-37.9, adult     Fibromyalgia     Obesity     Osteoarthritis 1985       Past Surgical History:   Procedure Laterality Date    ADENOIDECTOMY      APPENDECTOMY      BARIATRIC SURGERY  2017     SECTION      CHOLECYSTECTOMY      DILATION AND CURETTAGE OF UTERUS      GASTRECTOMY  2017    LAPAROSCOPIC CHOLECYSTECTOMY WITH CHOLANGIOGRAPHY  2019    LAPAROSCOPIC CHOLECYSTECTOMY WITH CHOLANGIOGRAPHY N/A 2019    Procedure: CHOLECYSTECTOMY, LAPAROSCOPIC, WITH CHOLANGIOGRAM;  Surgeon: Eladio Dawson MD;  Location: [facility];  Service: General;  Laterality: N/A;    TONSILLECTOMY      TUBAL LIGATION         Current Medications[1]    Review of patient's allergies indicates:   Allergen Reactions    Pcn [penicillins] Other (See Comments)     Doesn't know reaction       Social History[2]    Family History   Problem Relation Name Age of Onset    Heart disease Mother Nia Cardoso     Arthritis Mother Nia Cardoso         " RHEUMATOID    Obesity Mother Nia Cardoso     Asthma Mother Nia Cardoso     Heart disease Father Salvador Cardoso     COPD Father Salvador Cardoso     Arthritis Father Salvador Cardoso         RHEUMATOID    Obesity Sister         Review of Systems   Constitutional:  Negative for activity change, appetite change, chills, fatigue, fever and unexpected weight change.   HENT:  Positive for trouble swallowing. Negative for ear pain, facial swelling, hearing loss, mouth sores, nosebleeds, sore throat and voice change.    Eyes: Negative.  Negative for pain and visual disturbance.   Respiratory: Negative.  Negative for cough, choking and shortness of breath.    Cardiovascular: Negative.  Negative for chest pain, palpitations and leg swelling.   Gastrointestinal:  Positive for constipation and diarrhea. Negative for abdominal pain, nausea and vomiting.   Endocrine: Positive for cold intolerance.   Genitourinary: Negative.    Musculoskeletal:  Negative for arthralgias, back pain, gait problem, myalgias, neck pain and neck stiffness.   Skin: Negative.  Negative for rash and wound.   Allergic/Immunologic: Negative.  Negative for immunocompromised state.   Neurological: Negative.  Negative for dizziness, facial asymmetry, speech difficulty, weakness, light-headedness, numbness and headaches.   Hematological: Negative.  Negative for adenopathy. Does not bruise/bleed easily.   Psychiatric/Behavioral: Negative.  Negative for dysphoric mood. The patient is not nervous/anxious.        Objective:     Physical Exam  Vitals reviewed.   Constitutional:       General: She is not in acute distress.     Appearance: She is well-developed.   HENT:      Head: Normocephalic and atraumatic.      Jaw: No trismus.      Salivary Glands: Right salivary gland is not diffusely enlarged or tender. Left salivary gland is not diffusely enlarged or tender.      Comments: Salivary glands - there are no lesions or asymmetric findings in the submandibular or parotid  glands     Right Ear: Hearing, tympanic membrane, ear canal and external ear normal.      Left Ear: Hearing, tympanic membrane, ear canal and external ear normal.      Nose: No nasal deformity or rhinorrhea.      Mouth/Throat:      Mouth: No oral lesions.      Tongue: No lesions.      Palate: No mass and lesions.      Pharynx: Uvula midline.   Eyes:      General: Lids are normal.      Extraocular Movements: Extraocular movements intact.      Conjunctiva/sclera:      Right eye: Right conjunctiva is not injected. No chemosis.     Left eye: Left conjunctiva is not injected. No chemosis.  Neck:      Thyroid: Thyroid mass present. No thyromegaly.      Vascular: No JVD.      Trachea: Phonation normal. No tracheal deviation.     Pulmonary:      Effort: Pulmonary effort is normal. No accessory muscle usage or respiratory distress.      Breath sounds: No stridor.   Musculoskeletal:         General: No tenderness.      Cervical back: Full passive range of motion without pain.   Lymphadenopathy:      Cervical: No cervical adenopathy.      Right cervical: No deep or posterior cervical adenopathy.     Left cervical: No deep or posterior cervical adenopathy.   Skin:     Findings: No erythema, lesion or rash.      Nails: There is no clubbing.   Neurological:      Mental Status: She is alert and oriented to person, place, and time.      Cranial Nerves: No cranial nerve deficit or facial asymmetry.      Motor: No weakness.      Gait: Gait normal.   Psychiatric:         Speech: Speech normal.         Behavior: Behavior is cooperative.     Answers submitted by the patient for this visit:  Review of Symptoms Questionnaire  (Submitted on 3/18/2025)  Fatigue (Tiredness)?: Yes         FINDINGS:  The right thyroid lobe measures 5.3 x 2.2 x 2.0 cm.  The left thyroid lobe measures 4.8 x 1.7 x 1.5 cm.  The total thyroid weight is 18.4 g.  There is a 26 mm circumscribed, wider than tall, mixed solid and cystic, anechoic nodule present medially  in the right thyroid midpole which shows internal inspissated colloid (TI-RADS 2).  There is a 12 x 10 x 10 mm smooth, circumscribed, wider than tall, isoechoic solid nodule present in the right thyroid midpole which shows no associated calcifications.  There is a 16 x 11 x 10 mm smooth, circumscribed, wider than tall, isoechoic solid nodule present within the junction of the mid and upper pole of the left thyroid lobe (TI-RADS 3).  There is a 6 x 6 x 3 mm smooth, circumscribed, wider than tall, isoechoic solid nodule present in the lower pole of the left thyroid lobe (TI-RADS 3).     No pathologically enlarged or morphologically abnormal lymph nodes in the left or right neck adjacent to the thyroid fossa.     Impression:     Multinodular thyroid gland.  No thyroid nodules which meet the criteria for FNA/core biopsy.  No concerning lymphadenopathy in the left or right neck adjacent to the thyroid fossa.        Electronically signed by:Harshad Elizalde MD  Date:                                            03/01/2025  Time:                                           09:33     Assessment & Plan:       Problem List Items Addressed This Visit       Multiple thyroid nodules - Primary    Nadia Saba is a 59 y.o. female with a multinodular goiter. She has a chronic cough, some PND, and dysphagia that localizes more inferiorly than the thyroid. Her ultrasound demonstrates bilateral nodules without suspicious characteristics, and no nodules met criteria for FNA. We discussed how thyroid surgery carries risks, and that it is unlikely to fix her nonspecific symptoms. Without a symptomatic goiter or suspicious nodules, there is no indication currently for surgery. I therefore recommended observation with repeat ultrasound in about a year. I stressed that things can change over time, so that this recommendation and assessment may have to change as data and information change. She could be a candidate for thyroid nodule ablation  with Dr. Johnson or Dr. Novoa.    She may follow up as needed. Time was allowed for questions, and all questions were answered to her apparent satisfaction.                  [1]   Current Outpatient Medications:     albuterol (VENTOLIN HFA) 90 mcg/actuation inhaler, INHALE TWO PUFFS BY MOUTH EVERY 6 HOURS AS NEEDED, Disp: 18 g, Rfl: 0    gabapentin (NEURONTIN) 300 MG capsule, Take 1 capsule (300 mg total) by mouth 2 (two) times daily., Disp: 90 capsule, Rfl: 1    meloxicam (MOBIC) 15 MG tablet, Take 1 tablet (15 mg total) by mouth once daily., Disp: 30 tablet, Rfl: 1    traZODone (DESYREL) 100 MG tablet, Take 1 tablet by mouth once daily., Disp: , Rfl:   [2]   Social History  Socioeconomic History    Marital status:    Occupational History    Occupation: clerical     Employer: [employer]   Tobacco Use    Smoking status: Never    Smokeless tobacco: Never   Substance and Sexual Activity    Alcohol use: No    Drug use: No    Sexual activity: Yes     Partners: Male     Birth control/protection: None     Social Drivers of Health     Financial Resource Strain: Low Risk  (3/13/2024)    Overall Financial Resource Strain (CARDIA)     Difficulty of Paying Living Expenses: Not hard at all   Food Insecurity: No Food Insecurity (3/13/2024)    Hunger Vital Sign     Worried About Running Out of Food in the Last Year: Never true     Ran Out of Food in the Last Year: Never true   Transportation Needs: No Transportation Needs (3/13/2024)    PRAPARE - Transportation     Lack of Transportation (Medical): No     Lack of Transportation (Non-Medical): No   Physical Activity: Sufficiently Active (3/13/2024)    Exercise Vital Sign     Days of Exercise per Week: 4 days     Minutes of Exercise per Session: 40 min   Stress: No Stress Concern Present (3/13/2024)    [employer] [place] [employer] - Occupational Stress Questionnaire     Feeling of Stress : Not at all   Housing Stability: Low Risk  (3/13/2024)    Housing  Stability Vital Sign     Unable to Pay for Housing in the Last Year: No     Number of Places Lived in the Last Year: 1     Unstable Housing in the Last Year: No

## 2025-04-02 ENCOUNTER — RESULTS FOLLOW-UP (OUTPATIENT)
Dept: PRIMARY CARE CLINIC | Facility: CLINIC | Age: 60
End: 2025-04-02

## 2025-04-02 ENCOUNTER — PATIENT MESSAGE (OUTPATIENT)
Dept: PRIMARY CARE CLINIC | Facility: CLINIC | Age: 60
End: 2025-04-02
Payer: COMMERCIAL

## 2025-04-02 DIAGNOSIS — R74.01 TRANSAMINITIS: Primary | ICD-10-CM

## 2025-04-25 ENCOUNTER — TELEPHONE (OUTPATIENT)
Dept: SPEECH THERAPY | Facility: HOSPITAL | Age: 60
End: 2025-04-25
Payer: COMMERCIAL

## 2025-06-24 ENCOUNTER — PATIENT MESSAGE (OUTPATIENT)
Dept: FAMILY MEDICINE | Facility: CLINIC | Age: 60
End: 2025-06-24
Payer: COMMERCIAL

## 2025-07-14 ENCOUNTER — OFFICE VISIT (OUTPATIENT)
Dept: PRIMARY CARE CLINIC | Facility: CLINIC | Age: 60
End: 2025-07-14
Payer: COMMERCIAL

## 2025-07-14 VITALS
OXYGEN SATURATION: 98 % | HEART RATE: 76 BPM | RESPIRATION RATE: 18 BRPM | SYSTOLIC BLOOD PRESSURE: 126 MMHG | BODY MASS INDEX: 38.69 KG/M2 | WEIGHT: 246.5 LBS | DIASTOLIC BLOOD PRESSURE: 82 MMHG | HEIGHT: 67 IN

## 2025-07-14 DIAGNOSIS — M25.572 ACUTE LEFT ANKLE PAIN: Primary | ICD-10-CM

## 2025-07-14 DIAGNOSIS — L53.9 ERYTHEMA: ICD-10-CM

## 2025-07-14 DIAGNOSIS — K11.7 XEROSTOMIA: ICD-10-CM

## 2025-07-14 DIAGNOSIS — R68.89 HEAT INTOLERANCE: ICD-10-CM

## 2025-07-14 PROCEDURE — 96372 THER/PROPH/DIAG INJ SC/IM: CPT | Mod: S$GLB,,, | Performed by: STUDENT IN AN ORGANIZED HEALTH CARE EDUCATION/TRAINING PROGRAM

## 2025-07-14 PROCEDURE — 3079F DIAST BP 80-89 MM HG: CPT | Mod: CPTII,S$GLB,, | Performed by: STUDENT IN AN ORGANIZED HEALTH CARE EDUCATION/TRAINING PROGRAM

## 2025-07-14 PROCEDURE — 99214 OFFICE O/P EST MOD 30 MIN: CPT | Mod: 25,S$GLB,, | Performed by: STUDENT IN AN ORGANIZED HEALTH CARE EDUCATION/TRAINING PROGRAM

## 2025-07-14 PROCEDURE — 99999 PR PBB SHADOW E&M-EST. PATIENT-LVL III: CPT | Mod: PBBFAC,,, | Performed by: STUDENT IN AN ORGANIZED HEALTH CARE EDUCATION/TRAINING PROGRAM

## 2025-07-14 PROCEDURE — 1160F RVW MEDS BY RX/DR IN RCRD: CPT | Mod: CPTII,S$GLB,, | Performed by: STUDENT IN AN ORGANIZED HEALTH CARE EDUCATION/TRAINING PROGRAM

## 2025-07-14 PROCEDURE — 3008F BODY MASS INDEX DOCD: CPT | Mod: CPTII,S$GLB,, | Performed by: STUDENT IN AN ORGANIZED HEALTH CARE EDUCATION/TRAINING PROGRAM

## 2025-07-14 PROCEDURE — 1159F MED LIST DOCD IN RCRD: CPT | Mod: CPTII,S$GLB,, | Performed by: STUDENT IN AN ORGANIZED HEALTH CARE EDUCATION/TRAINING PROGRAM

## 2025-07-14 PROCEDURE — 3074F SYST BP LT 130 MM HG: CPT | Mod: CPTII,S$GLB,, | Performed by: STUDENT IN AN ORGANIZED HEALTH CARE EDUCATION/TRAINING PROGRAM

## 2025-07-14 RX ORDER — KETOROLAC TROMETHAMINE 30 MG/ML
60 INJECTION, SOLUTION INTRAMUSCULAR; INTRAVENOUS
Status: COMPLETED | OUTPATIENT
Start: 2025-07-14 | End: 2025-07-14

## 2025-07-14 RX ORDER — SULFAMETHOXAZOLE AND TRIMETHOPRIM 800; 160 MG/1; MG/1
1 TABLET ORAL 2 TIMES DAILY
Qty: 10 TABLET | Refills: 0 | Status: SHIPPED | OUTPATIENT
Start: 2025-07-14 | End: 2025-07-19

## 2025-07-14 RX ADMIN — KETOROLAC TROMETHAMINE 60 MG: 30 INJECTION, SOLUTION INTRAMUSCULAR; INTRAVENOUS at 10:07

## 2025-07-14 NOTE — PROGRESS NOTES
Identified pt. By name and   Administered torodol 60 mg to right outer glute using aseptic technique

## 2025-07-14 NOTE — PROGRESS NOTES
Subjective:       Patient ID: Nadia Saba is a 59 y.o. female.    Chief Complaint: No chief complaint on file.    HPI:  59 y.o. female presents to [facility] here with concerns for left ankle swelling. Red region that is hot to touch.    Patient reports that her dorsal proximal foot pain 7/9/2025. Has spread into distal shin and now has swelling and tenderness. Patient reports has been noticing dry mouth and nail color changes with linear dark streaks. Hasn't done anything to explain black streaks. Dry mouth can even wake her up at times. 7/12/2025 dry mouth began. Same time as nail changes. No dry eyes or vaginal mucosa. No new medications or supplements.    Recent dental infection or pain?: None    Onset?: 7/9/2025  Progression?: Persistent  Inciting incident/new physical activity?: No known  Past trauma/surgery?: No, thought maybe laces are too tight  Recurrent?: No  Description?: Aching and feels tight  Radiates?: Into shin  Severity?: 8/10  Worsening factors?: Standing too long, sitting too long  Interventions?: Elevation  Did interventions help?: No  No new shoe wear  History of bariatric surgery, MI, renal disease, or GI bleed/ulcer?: Gastric sleeve  On Blood thinners?: No    Review of Systems   Constitutional:  Positive for diaphoresis. Negative for chills, fatigue and fever.   HENT:  Negative for congestion, sinus pressure, sneezing and sore throat.    Respiratory:  Negative for cough and shortness of breath.    Cardiovascular:  Negative for chest pain and palpitations.   Gastrointestinal:  Negative for abdominal pain, diarrhea, nausea and vomiting.   Endocrine: Positive for heat intolerance. Negative for cold intolerance.   Musculoskeletal:  Positive for arthralgias (Left ankle) and joint swelling (Left ankle). Negative for myalgias.   Skin:  Negative for rash and wound.   Neurological:  Negative for weakness and numbness.       Objective:      Vitals:    07/14/25 0929   BP: 126/82   BP Location:  "Right arm   Patient Position: Sitting   Pulse: 76   Resp: 18   SpO2: 98%   Weight: 111.8 kg (246 lb 7.6 oz)   Height: 5' 7" (1.702 m)     Physical Exam  Vitals reviewed.   Constitutional:       General: She is not in acute distress.     Appearance: Normal appearance. She is not ill-appearing.   HENT:      Head: Normocephalic and atraumatic.   Eyes:      General:         Right eye: No discharge.         Left eye: No discharge.      Conjunctiva/sclera: Conjunctivae normal.   Cardiovascular:      Rate and Rhythm: Normal rate and regular rhythm.      Pulses: Normal pulses.      Heart sounds: No murmur heard.  Pulmonary:      Effort: Pulmonary effort is normal.      Breath sounds: Normal breath sounds.   Musculoskeletal:         General: No deformity.      Cervical back: Neck supple. No rigidity.        Feet:    Feet:      Comments: Tenderness to extensor retinaculum region. Erythema to region in green  Lymphadenopathy:      Cervical: No cervical adenopathy.   Skin:     General: Skin is warm and dry.      Coloration: Skin is not jaundiced.   Neurological:      General: No focal deficit present.      Mental Status: She is alert and oriented to person, place, and time.   Psychiatric:         Mood and Affect: Mood normal.         Behavior: Behavior normal.             Lab Results   Component Value Date     02/27/2025    K 3.8 02/27/2025     02/27/2025    CO2 24 02/27/2025    BUN 18 02/27/2025    CREATININE 0.8 02/27/2025    ANIONGAP 12 02/27/2025     Lab Results   Component Value Date    HGBA1C 5.0 04/19/2022     Lab Results   Component Value Date    BNP 15 02/09/2022    BNP 17 09/21/2021    BNP 56 09/23/2013       Lab Results   Component Value Date    WBC 5.33 02/27/2025    HGB 13.0 02/27/2025    HCT 39.1 02/27/2025     02/27/2025    GRAN 3.0 02/27/2025    GRAN 56.5 02/27/2025     Lab Results   Component Value Date    CHOL 191 02/09/2022    HDL 69 02/09/2022    LDLCALC 102.4 02/09/2022    TRIG 98 " 02/09/2022        Current Medications[1]        Assessment:       1. Acute left ankle pain    2. Xerostomia    3. Heat intolerance           Plan:       Acute left ankle pain  -     Sedimentation rate; Future; Expected date: 07/14/2025  -     Procalcitonin; Future; Expected date: 07/14/2025  -     C-reactive protein; Future; Expected date: 07/14/2025  -     ketorolac injection 60 mg  -     X-Ray Ankle Complete 3 View Left; Future; Expected date: 07/14/2025  - RICE therapy explained and recommended. Do not ice for longer than 20 minutes and use a towel barrier  - With erythema to distal shin, will order inflammation markers, low suspicion for septic joint, will order pro-calcitonin to help lower suspicion    Xerostomia  Heat intolerance  -     TSH; Future; Expected date: 07/14/2025  -     T4, Free; Future; Expected date: 07/14/2025  -     Rheumatoid Factor; Future; Expected date: 07/14/2025  -     ANTI-SSA + ANTI-SSB; Future; Expected date: 07/14/2025  -     CALI Screen w/Reflex; Future; Expected date: 07/14/2025  - Recommend sour candies such as lemon drops    RTC PRN         [1]   Current Outpatient Medications:     albuterol (VENTOLIN HFA) 90 mcg/actuation inhaler, INHALE TWO PUFFS BY MOUTH EVERY 6 HOURS AS NEEDED, Disp: 18 g, Rfl: 0    gabapentin (NEURONTIN) 300 MG capsule, Take 1 capsule (300 mg total) by mouth 2 (two) times daily., Disp: 90 capsule, Rfl: 1    meloxicam (MOBIC) 15 MG tablet, Take 1 tablet (15 mg total) by mouth once daily., Disp: 30 tablet, Rfl: 1    traZODone (DESYREL) 100 MG tablet, Take 1 tablet by mouth once daily., Disp: , Rfl:     Current Facility-Administered Medications:     ketorolac injection 60 mg, 60 mg, Intramuscular, 1 time in Clinic/HOD,

## 2025-07-30 ENCOUNTER — PATIENT MESSAGE (OUTPATIENT)
Dept: PRIMARY CARE CLINIC | Facility: CLINIC | Age: 60
End: 2025-07-30
Payer: COMMERCIAL

## 2025-07-31 DIAGNOSIS — M25.572 ACUTE LEFT ANKLE PAIN: Primary | ICD-10-CM

## 2025-08-04 DIAGNOSIS — M25.472 LEFT ANKLE SWELLING: ICD-10-CM

## 2025-08-04 DIAGNOSIS — M25.572 ACUTE LEFT ANKLE PAIN: Primary | ICD-10-CM

## 2025-08-12 ENCOUNTER — RESULTS FOLLOW-UP (OUTPATIENT)
Dept: PRIMARY CARE CLINIC | Facility: CLINIC | Age: 60
End: 2025-08-12
Payer: COMMERCIAL

## 2025-08-14 ENCOUNTER — TELEPHONE (OUTPATIENT)
Dept: ORTHOPEDICS | Facility: CLINIC | Age: 60
End: 2025-08-14
Payer: COMMERCIAL

## 2025-09-04 DIAGNOSIS — M17.11 PRIMARY OSTEOARTHRITIS OF RIGHT KNEE: Primary | ICD-10-CM

## (undated) DEVICE — DEVICE TRUCLEAR INCISOR PLUS

## (undated) DEVICE — UNDERGLOVES BIOGEL PI SIZE 7.5

## (undated) DEVICE — SHEARS HARMONIC 5CM 36CM

## (undated) DEVICE — SUT SURGIDAC ENDO STITCH2-0

## (undated) DEVICE — TUBING HF INSUFFLATION W/ FLTR

## (undated) DEVICE — SOL BETADINE 5%

## (undated) DEVICE — STAPLER ECHELON FLEX 60MM 44CM

## (undated) DEVICE — DEVICE TRUECLEAR INCISOR 2.9

## (undated) DEVICE — RELOAD ECHELON FLEX BLU 60MM

## (undated) DEVICE — SOL PVP-I SCRUB 7.5% 4OZ

## (undated) DEVICE — TROCAR ENDOPATH XCEL 5X100MM

## (undated) DEVICE — CLOSURE SKIN STERI STRIP 1/2X4

## (undated) DEVICE — SYR 10CC LUER LOCK

## (undated) DEVICE — RELOAD ECHELON FLEX GRN 60MM

## (undated) DEVICE — TRAY DRY SKIN SCRUB PREP

## (undated) DEVICE — SOL 9P NACL IRR PIC IL

## (undated) DEVICE — SEE MEDLINE ITEM 152487

## (undated) DEVICE — DRAPE ABDOMINAL TIBURON 14X11

## (undated) DEVICE — NDL HYPO REG 25G X 1 1/2

## (undated) DEVICE — SUT MCRYL PLUS 4-0 PS2 27IN

## (undated) DEVICE — Device

## (undated) DEVICE — SOL NS 1000CC

## (undated) DEVICE — TROCAR ENDOPATH XCEL 12X100MM

## (undated) DEVICE — ELECTRODE REM PLYHSV RETURN 9

## (undated) DEVICE — CANNULA ENDOPATH XCEL 5X100MM

## (undated) DEVICE — ENDOSTITCH INSTRUMENT

## (undated) DEVICE — SUT 0 VICRYL / UR6 (J603)

## (undated) DEVICE — SET BASIN 48X48IN 6000ML RING

## (undated) DEVICE — SEE MEDLINE ITEM 156902

## (undated) DEVICE — SOL IRR NACL .9% 3000ML

## (undated) DEVICE — ADHESIVE MASTISOL VIAL 48/BX

## (undated) DEVICE — SET INFLOW TUBE HYSTER

## (undated) DEVICE — JELLY KY LUBRICATING 5G PACKET

## (undated) DEVICE — NDL 18GA X1 1/2 REG BEVEL

## (undated) DEVICE — GLOVE BIOGEL SKINSENSE PI 7.0

## (undated) DEVICE — SEE MEDLINE ITEM 146313

## (undated) DEVICE — TROCAR ENDOPATH XCEL 12MM 10CM